# Patient Record
Sex: FEMALE | Race: WHITE | NOT HISPANIC OR LATINO | Employment: OTHER | URBAN - METROPOLITAN AREA
[De-identification: names, ages, dates, MRNs, and addresses within clinical notes are randomized per-mention and may not be internally consistent; named-entity substitution may affect disease eponyms.]

---

## 2019-04-18 ENCOUNTER — TRANSFERRED RECORDS (OUTPATIENT)
Dept: HEALTH INFORMATION MANAGEMENT | Facility: CLINIC | Age: 64
End: 2019-04-18

## 2019-04-29 ENCOUNTER — TELEPHONE (OUTPATIENT)
Dept: AUDIOLOGY | Facility: CLINIC | Age: 64
End: 2019-04-29

## 2019-05-01 ENCOUNTER — PRE VISIT (OUTPATIENT)
Dept: AUDIOLOGY | Facility: CLINIC | Age: 64
End: 2019-05-01

## 2019-05-01 NOTE — TELEPHONE ENCOUNTER
FUTURE VISIT INFORMATION      FUTURE VISIT INFORMATION:    Date: 5/17/19    Time: 12:30pm    Location: [facility] [place]  REFERRAL INFORMATION:    Referring provider:  N/a    Referring providers clinic:  [facility]    Reason for visit/diagnosis  CIE    RECORDS REQUESTED FROM:       Clinic name Comments Records Status Imaging Status   [facility] Request for records faxed 5/1- Audio from 4/18/19 received and sent to scanning EPIC

## 2019-05-07 ENCOUNTER — TELEPHONE (OUTPATIENT)
Dept: AUDIOLOGY | Facility: CLINIC | Age: 64
End: 2019-05-07

## 2019-05-07 NOTE — TELEPHONE ENCOUNTER
Returned patient's call and let her know that we will bill Medicare for all of the testing completed on 5/17 during her CIE appointment and typically the only part that we know is not covered in part by Medicare is the hearing aid check portion. She reported that her hearing aids are brand new so won't need to be checked. I explained that this is a mandatory part of the appointment so they it can be documented that all aided testing is completed with appropriately fit hearing aids.    Patient also reported she has a list of questions which I encouraged her to bring with to the appointment. She also has an appointment scheduled with [place] on the 15th of this month and asked why she should come to our clinic rather than [facility]. I assured her that the candidacy criteria is going to be the same wherever she decides to go for the CI and that she should choose a clinic that she is comfortable with and is convenient for her to get to as there are typically a lot of visits scheduled in the first few months.    Tato Damico, [facility]  Licensed Audiologist  [place] License #[id number]

## 2019-05-07 NOTE — TELEPHONE ENCOUNTER
[facility] Call Center    Phone Message    May a detailed message be left on voicemail: yes    Reason for Call: Other: pt wants to know if her CIE visit will be covered my Medicare. Please call to discuss.  pt says she was also at a coffee and chat and has a list of questions she would like answered.     Action Taken: Message routed to:  [facility] ([facility] aud

## 2019-05-16 ENCOUNTER — HEALTH MAINTENANCE LETTER (OUTPATIENT)
Age: 64
End: 2019-05-16

## 2019-05-17 ENCOUNTER — OFFICE VISIT (OUTPATIENT)
Dept: AUDIOLOGY | Facility: CLINIC | Age: 64
End: 2019-05-17
Payer: MEDICARE

## 2019-05-17 DIAGNOSIS — H90.3 SENSORINEURAL HEARING LOSS, BILATERAL: Primary | ICD-10-CM

## 2019-05-18 NOTE — PROGRESS NOTES
AUDIOLOGY REPORT    PURPOSE: To evaluate candidacy for cochlear implant (CI). Candidacy requires at least a moderate to profound sensorineural hearing loss in both ears, good general health, lack of benefit from conventional amplification as determined from the tests below, psychological/emotional stability and motivationally suitable, with realistic expectations, and absence of medical conditions contraindicating surgical intervention.     BACKGROUND:  Justus Lozano was seen on 5/17/2019 was seen in Audiology at the [facility], for a cochlear implant evaluation, which was ordered by Dr. Alem Vizcaino. Justus Lozano has been diagnosed with left normal to moderately-severe sensorineural hearing loss and a right mild to profound sensorineural hearing loss.      Onset of hearing loss: Early grade school/young adulthood. Patient reported that she did not pass classroom screening  Identification of hearing loss: 36 years old  Onset of profound hearing loss: 28 years old in the right ear  Etiology of hearing loss: Unknown, patient reports possible speculation of frequent ear infections in the right ear in childhood  Sudden or Progressive: Progressive  Age Hearing Aid fit: 38 years old  Duration of Hearing Aid use: Consistent since first fit  Current Hearing Aid Type: Oticon Opn 1 with custom EMANI mold  Age of current Hearing Aid: 2 months (recently was sent new ones due to need for repair)  Tinnitus: Present bilaterally  Balance: No issues reported  Other:  N/A  Does patient exhibit intelligible vocalizations?  Yes  Primary Mode of Communication: Oral/aural/lipreading   Previous Surgeries: Repaired tibia (1995), pelvis floor repair (2014), hernia repair (2016), bunion surgery (2017).  Previous Ear Surgeries: N/A      TEST RESULTS:    Audiometric Results (see audiogram): Normal sloping to moderately-severe sensorineural hearing loss in the left ear. Mild to profound  sensorineural hearing loss in the right ear  Tympanograms: normal eardrum mobility bilaterally  Acoustic Reflexes: Right ipsi reflex absent, left elevated.  Contralateral reflexes absent bilaterally.  Otoacoustic emissions: DPOAEs absent 2-5 kHz bilaterally.      Device(s) used for Aided Testing:   Right ear: Patient's personal Oticon Opn 1  Left ear: Patient's personal Oticon Opn 1    Electroacoustic Test Results: Electroacoustic analysis revealed patient's hearing aids were functioning appriopriately and up to 's specifications. Patient's personal left hearing aid was meeting NAL-NL1 prescriptive targets when assessed using simulated real-ear measurements, the right one was slightly below target, however patient reported that she had it decreased in the high frequencies slightly to reduce distortion. Patient's personal hearing aids were used for all testing.      35 minutes were spent assessing the patient s auditory rehabilitation status in order to determine whether conventional amplification is beneficial or whether the patient is an audiologic candidate for a cochlear implant.      Soundfield Thresholds (see audiogram): Aided thresholds in normal to moderate hearing loss range with left hearing aid and in the normal to profound hearing loss range with right hearing aid.    CNC Words Test: The patient repeats 25 single syllable words, auditory only. The words are presented at 60 dB SPL (conversational level) delivered from a CD player.     Left ear aided: words: 72%, phonemes: 88%  Right ear aided: words: 36%, phonemes: 59%    AzBio Sentences Test: The patient repeats 20 sentences, auditory only.  The sentences are presented at 60 dB SPL (conversational level) delivered from a CD player.       Left ear aided: 93%  Right ear aided: 60%  Bilaterally aided: 93%, +10 dB SNR: 73%    EAR RECOMMENDED FOR IMPLANTATION: Neither    REASON: Patient is not meeting FDA/Medicare candidacy criteria and is  receiving adequate speech understanding benefit from hearing aids at this time.    COMMENTS: We discussed FDA and Medicare candidacy guidelines and that Justus is not meeting criteria. Other items discussed included:    -Cochlear implant systems including the internal and external devices. We also discussed how they work and function differently than hearing aids. This included the differences between acoustic and electric hearing.    -We discussed that as though her single word scores are poorer than her sentence scores, that Medicare does not recognize CNC scores for the hybrid implant.     -We discussed the addition of wireless accessories, such as a remote microphone which could help in the areas that the hearing aids do not seem to do enough, such as in background noise or in the car.      SUMMARY AND RECOMMENDATIONS: Justus Lozano has been diagnosed with a bilateral sensorineural hearing loss and is not a candidate for cochlear implantation at this time. It is recommended that she return to their managing audiologist for hearing aid care. Please contact the clinic at [phone] with questions regarding these results or recommendations.    Tato Damico, [facility]  Licensed Audiologist  [place] License #[id number]

## 2019-05-20 DIAGNOSIS — H91.90 CHANGE IN HEARING: Primary | ICD-10-CM

## 2019-05-20 NOTE — PROGRESS NOTES
Rashel Blue Der, RN          Previous Messages      ----- Message -----   From: Sandy Johnson, Jaquan   Sent: 5/18/2019  10:11 AM   To: Rashel Kiser,     Can you put in an order for this CIE from Friday? I think she was referred to [facility].     THANK YOU!!!

## 2019-10-01 ENCOUNTER — HEALTH MAINTENANCE LETTER (OUTPATIENT)
Age: 64
End: 2019-10-01

## 2019-12-15 ENCOUNTER — HEALTH MAINTENANCE LETTER (OUTPATIENT)
Age: 64
End: 2019-12-15

## 2021-01-15 ENCOUNTER — HEALTH MAINTENANCE LETTER (OUTPATIENT)
Age: 66
End: 2021-01-15

## 2021-03-08 ENCOUNTER — TRANSFERRED RECORDS (OUTPATIENT)
Dept: HEALTH INFORMATION MANAGEMENT | Facility: CLINIC | Age: 66
End: 2021-03-08

## 2021-06-23 ENCOUNTER — TRANSFERRED RECORDS (OUTPATIENT)
Dept: HEALTH INFORMATION MANAGEMENT | Facility: CLINIC | Age: 66
End: 2021-06-23

## 2021-09-04 ENCOUNTER — HEALTH MAINTENANCE LETTER (OUTPATIENT)
Age: 66
End: 2021-09-04

## 2021-10-30 ENCOUNTER — HEALTH MAINTENANCE LETTER (OUTPATIENT)
Age: 66
End: 2021-10-30

## 2021-12-08 ENCOUNTER — TRANSFERRED RECORDS (OUTPATIENT)
Dept: HEALTH INFORMATION MANAGEMENT | Facility: CLINIC | Age: 66
End: 2021-12-08

## 2022-02-15 ENCOUNTER — APPOINTMENT (OUTPATIENT)
Dept: GENERAL RADIOLOGY | Facility: CLINIC | Age: 67
DRG: 501 | End: 2022-02-15
Attending: EMERGENCY MEDICINE
Payer: MEDICARE

## 2022-02-15 ENCOUNTER — HOSPITAL ENCOUNTER (INPATIENT)
Facility: CLINIC | Age: 67
LOS: 4 days | Discharge: HOME HEALTH | DRG: 501 | End: 2022-02-19
Attending: EMERGENCY MEDICINE | Admitting: INTERNAL MEDICINE
Payer: MEDICARE

## 2022-02-15 ENCOUNTER — APPOINTMENT (OUTPATIENT)
Dept: CT IMAGING | Facility: CLINIC | Age: 67
DRG: 501 | End: 2022-02-15
Attending: EMERGENCY MEDICINE
Payer: MEDICARE

## 2022-02-15 DIAGNOSIS — S22.080A COMPRESSION FRACTURE OF T11 VERTEBRA, INITIAL ENCOUNTER (H): ICD-10-CM

## 2022-02-15 DIAGNOSIS — K59.03 DRUG-INDUCED CONSTIPATION: ICD-10-CM

## 2022-02-15 DIAGNOSIS — S52.501A CLOSED FRACTURE OF DISTAL END OF RIGHT RADIUS, UNSPECIFIED FRACTURE MORPHOLOGY, INITIAL ENCOUNTER: ICD-10-CM

## 2022-02-15 DIAGNOSIS — S32.10XA CLOSED FRACTURE OF SACRUM, UNSPECIFIED PORTION OF SACRUM, INITIAL ENCOUNTER (H): ICD-10-CM

## 2022-02-15 DIAGNOSIS — S52.611A CLOSED DISPLACED FRACTURE OF STYLOID PROCESS OF RIGHT ULNA, INITIAL ENCOUNTER: ICD-10-CM

## 2022-02-15 DIAGNOSIS — N30.00 ACUTE CYSTITIS WITHOUT HEMATURIA: ICD-10-CM
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
## 2022-02-15 PROBLEM — U07.1 CLINICAL DIAGNOSIS OF COVID-19: Status: ACTIVE | Noted: 2022-02-15

## 2022-02-15 LAB
ABO/RH(D): NORMAL
ANION GAP SERPL CALCULATED.3IONS-SCNC: 7 MMOL/L (ref 3–14)
ANTIBODY SCREEN: NEGATIVE
BASOPHILS # BLD AUTO: 0 10E3/UL (ref 0–0.2)
BASOPHILS NFR BLD AUTO: 0 %
BUN SERPL-MCNC: 12 MG/DL (ref 7–30)
CALCIUM SERPL-MCNC: 8.6 MG/DL (ref 8.5–10.1)
CHLORIDE BLD-SCNC: 105 MMOL/L (ref 94–109)
CO2 SERPL-SCNC: 22 MMOL/L (ref 20–32)
CREAT SERPL-MCNC: 0.49 MG/DL (ref 0.52–1.25)
EOSINOPHIL # BLD AUTO: 0 10E3/UL (ref 0–0.7)
EOSINOPHIL NFR BLD AUTO: 0 %
ERYTHROCYTE [DISTWIDTH] IN BLOOD BY AUTOMATED COUNT: 13.2 % (ref 10–15)
GFR SERPL CREATININE-BSD FRML MDRD: >90 ML/MIN/1.73M2
GLUCOSE BLD-MCNC: 102 MG/DL (ref 70–99)
GLUCOSE BLDC GLUCOMTR-MCNC: 115 MG/DL (ref 70–99)
HCT VFR BLD AUTO: 41.6 % (ref 35–53)
HGB BLD-MCNC: 13.6 G/DL (ref 11.7–17.7)
IMM GRANULOCYTES # BLD: 0.1 10E3/UL
IMM GRANULOCYTES NFR BLD: 1 %
INR PPP: 1.08 (ref 0.85–1.15)
LYMPHOCYTES # BLD AUTO: 1 10E3/UL (ref 0.8–5.3)
LYMPHOCYTES NFR BLD AUTO: 9 %
MCH RBC QN AUTO: 30.5 PG (ref 26.5–33)
MCHC RBC AUTO-ENTMCNC: 32.7 G/DL (ref 31.5–36.5)
MCV RBC AUTO: 93 FL (ref 78–100)
MONOCYTES # BLD AUTO: 0.7 10E3/UL (ref 0–1.3)
MONOCYTES NFR BLD AUTO: 6 %
NEUTROPHILS # BLD AUTO: 9.9 10E3/UL (ref 1.6–8.3)
NEUTROPHILS NFR BLD AUTO: 84 %
NRBC # BLD AUTO: 0 10E3/UL
NRBC BLD AUTO-RTO: 0 /100
PLATELET # BLD AUTO: 235 10E3/UL (ref 150–450)
POTASSIUM BLD-SCNC: 4.2 MMOL/L (ref 3.4–5.3)
RBC # BLD AUTO: 4.46 10E6/UL (ref 3.8–5.9)
SARS-COV-2 RNA RESP QL NAA+PROBE: POSITIVE
SODIUM SERPL-SCNC: 134 MMOL/L (ref 133–144)
SPECIMEN EXPIRATION DATE: NORMAL
WBC # BLD AUTO: 11.7 10E3/UL (ref 4–11)

## 2022-02-15 PROCEDURE — 85004 AUTOMATED DIFF WBC COUNT: CPT | Performed by: EMERGENCY MEDICINE

## 2022-02-15 PROCEDURE — 72100 X-RAY EXAM L-S SPINE 2/3 VWS: CPT

## 2022-02-15 PROCEDURE — 73110 X-RAY EXAM OF WRIST: CPT | Mod: RT

## 2022-02-15 PROCEDURE — 36415 COLL VENOUS BLD VENIPUNCTURE: CPT | Performed by: EMERGENCY MEDICINE

## 2022-02-15 PROCEDURE — 96376 TX/PRO/DX INJ SAME DRUG ADON: CPT

## 2022-02-15 PROCEDURE — 250N000011 HC RX IP 250 OP 636: Performed by: INTERNAL MEDICINE

## 2022-02-15 PROCEDURE — 25605 CLTX DST RDL FX/EPHYS SEP W/: CPT | Mod: RT

## 2022-02-15 PROCEDURE — 96374 THER/PROPH/DIAG INJ IV PUSH: CPT

## 2022-02-15 PROCEDURE — 999N000065 XR WRIST PORTABLE RIGHT 2 VIEWS: Mod: RT

## 2022-02-15 PROCEDURE — 99285 EMERGENCY DEPT VISIT HI MDM: CPT | Mod: 25

## 2022-02-15 PROCEDURE — 80048 BASIC METABOLIC PNL TOTAL CA: CPT | Performed by: EMERGENCY MEDICINE

## 2022-02-15 PROCEDURE — 258N000003 HC RX IP 258 OP 636: Performed by: INTERNAL MEDICINE

## 2022-02-15 PROCEDURE — 250N000013 HC RX MED GY IP 250 OP 250 PS 637: Performed by: INTERNAL MEDICINE

## 2022-02-15 PROCEDURE — 250N000013 HC RX MED GY IP 250 OP 250 PS 637: Performed by: HOSPITALIST

## 2022-02-15 PROCEDURE — 250N000013 HC RX MED GY IP 250 OP 250 PS 637: Performed by: EMERGENCY MEDICINE

## 2022-02-15 PROCEDURE — C9803 HOPD COVID-19 SPEC COLLECT: HCPCS

## 2022-02-15 PROCEDURE — 72131 CT LUMBAR SPINE W/O DYE: CPT

## 2022-02-15 PROCEDURE — 99223 1ST HOSP IP/OBS HIGH 75: CPT | Mod: AI | Performed by: INTERNAL MEDICINE

## 2022-02-15 PROCEDURE — 0PSHXZZ REPOSITION RIGHT RADIUS, EXTERNAL APPROACH: ICD-10-PCS | Performed by: EMERGENCY MEDICINE

## 2022-02-15 PROCEDURE — 120N000001 HC R&B MED SURG/OB

## 2022-02-15 PROCEDURE — 250N000011 HC RX IP 250 OP 636: Performed by: EMERGENCY MEDICINE

## 2022-02-15 PROCEDURE — 250N000011 HC RX IP 250 OP 636

## 2022-02-15 PROCEDURE — 85610 PROTHROMBIN TIME: CPT | Performed by: EMERGENCY MEDICINE

## 2022-02-15 PROCEDURE — 86901 BLOOD TYPING SEROLOGIC RH(D): CPT | Performed by: EMERGENCY MEDICINE

## 2022-02-15 PROCEDURE — 87635 SARS-COV-2 COVID-19 AMP PRB: CPT | Performed by: EMERGENCY MEDICINE

## 2022-02-15 RX ORDER — CEPHALEXIN 500 MG/1
500 CAPSULE ORAL 2 TIMES DAILY
Status: COMPLETED | OUTPATIENT
Start: 2022-02-15 | End: 2022-02-18

## 2022-02-15 RX ORDER — IBUPROFEN 200 MG
1 CAPSULE ORAL DAILY
COMMUNITY

## 2022-02-15 RX ORDER — VIT C/E/ZN/COPPR/LUTEIN/ZEAXAN 60 MG-6 MG
1 CAPSULE ORAL DAILY
Status: DISCONTINUED | OUTPATIENT
Start: 2022-02-16 | End: 2022-02-19 | Stop reason: HOSPADM

## 2022-02-15 RX ORDER — ZOLPIDEM TARTRATE 5 MG/1
5 TABLET ORAL
Status: DISCONTINUED | OUTPATIENT
Start: 2022-02-15 | End: 2022-02-19 | Stop reason: HOSPADM

## 2022-02-15 RX ORDER — OXYCODONE HYDROCHLORIDE 5 MG/1
10 TABLET ORAL ONCE
Status: COMPLETED | OUTPATIENT
Start: 2022-02-15 | End: 2022-02-15

## 2022-02-15 RX ORDER — ACETAMINOPHEN 325 MG/1
650 TABLET ORAL EVERY 6 HOURS PRN
Status: DISCONTINUED | OUTPATIENT
Start: 2022-02-15 | End: 2022-02-16 | Stop reason: ALTCHOICE

## 2022-02-15 RX ORDER — NALOXONE HYDROCHLORIDE 0.4 MG/ML
0.4 INJECTION, SOLUTION INTRAMUSCULAR; INTRAVENOUS; SUBCUTANEOUS
Status: DISCONTINUED | OUTPATIENT
Start: 2022-02-15 | End: 2022-02-19 | Stop reason: HOSPADM

## 2022-02-15 RX ORDER — AMOXICILLIN 250 MG
2 CAPSULE ORAL 2 TIMES DAILY
Status: DISCONTINUED | OUTPATIENT
Start: 2022-02-15 | End: 2022-02-19 | Stop reason: HOSPADM

## 2022-02-15 RX ORDER — AMOXICILLIN 250 MG
1 CAPSULE ORAL 2 TIMES DAILY
Status: DISCONTINUED | OUTPATIENT
Start: 2022-02-15 | End: 2022-02-19 | Stop reason: HOSPADM

## 2022-02-15 RX ORDER — CHOLECALCIFEROL (VITAMIN D3) 50 MCG
50 TABLET ORAL DAILY
Status: DISCONTINUED | OUTPATIENT
Start: 2022-02-16 | End: 2022-02-19 | Stop reason: HOSPADM

## 2022-02-15 RX ORDER — OXYCODONE HYDROCHLORIDE 5 MG/1
5 TABLET ORAL EVERY 4 HOURS PRN
Status: DISCONTINUED | OUTPATIENT
Start: 2022-02-15 | End: 2022-02-15

## 2022-02-15 RX ORDER — PROPOFOL 10 MG/ML
INJECTION, EMULSION INTRAVENOUS
Status: COMPLETED
Start: 2022-02-15 | End: 2022-02-15

## 2022-02-15 RX ORDER — HYDROMORPHONE HYDROCHLORIDE 1 MG/ML
.5-1 INJECTION, SOLUTION INTRAMUSCULAR; INTRAVENOUS; SUBCUTANEOUS
Status: DISCONTINUED | OUTPATIENT
Start: 2022-02-15 | End: 2022-02-15

## 2022-02-15 RX ORDER — ONDANSETRON 4 MG/1
4 TABLET, ORALLY DISINTEGRATING ORAL EVERY 6 HOURS PRN
Status: DISCONTINUED | OUTPATIENT
Start: 2022-02-15 | End: 2022-02-19 | Stop reason: HOSPADM

## 2022-02-15 RX ORDER — ROSUVASTATIN CALCIUM 5 MG/1
5 TABLET, COATED ORAL EVERY EVENING
Status: DISCONTINUED | OUTPATIENT
Start: 2022-02-15 | End: 2022-02-19 | Stop reason: HOSPADM

## 2022-02-15 RX ORDER — CHOLECALCIFEROL (VITAMIN D3) 50 MCG
1 TABLET ORAL DAILY
COMMUNITY

## 2022-02-15 RX ORDER — SODIUM CHLORIDE 9 MG/ML
INJECTION, SOLUTION INTRAVENOUS CONTINUOUS
Status: DISCONTINUED | OUTPATIENT
Start: 2022-02-15 | End: 2022-02-16

## 2022-02-15 RX ORDER — ONDANSETRON 2 MG/ML
INJECTION INTRAMUSCULAR; INTRAVENOUS
Status: COMPLETED
Start: 2022-02-15 | End: 2022-02-15

## 2022-02-15 RX ORDER — NALOXONE HYDROCHLORIDE 0.4 MG/ML
0.2 INJECTION, SOLUTION INTRAMUSCULAR; INTRAVENOUS; SUBCUTANEOUS
Status: DISCONTINUED | OUTPATIENT
Start: 2022-02-15 | End: 2022-02-19 | Stop reason: HOSPADM

## 2022-02-15 RX ORDER — LIDOCAINE 40 MG/G
CREAM TOPICAL
Status: DISCONTINUED | OUTPATIENT
Start: 2022-02-15 | End: 2022-02-19 | Stop reason: HOSPADM

## 2022-02-15 RX ORDER — ONDANSETRON 2 MG/ML
4 INJECTION INTRAMUSCULAR; INTRAVENOUS EVERY 6 HOURS PRN
Status: DISCONTINUED | OUTPATIENT
Start: 2022-02-15 | End: 2022-02-19 | Stop reason: HOSPADM

## 2022-02-15 RX ORDER — ACETAMINOPHEN 650 MG/1
650 SUPPOSITORY RECTAL EVERY 6 HOURS PRN
Status: DISCONTINUED | OUTPATIENT
Start: 2022-02-15 | End: 2022-02-19 | Stop reason: HOSPADM

## 2022-02-15 RX ORDER — ALENDRONATE SODIUM 70 MG/1
70 TABLET ORAL
COMMUNITY
End: 2022-06-06

## 2022-02-15 RX ORDER — SUMATRIPTAN 50 MG/1
50 TABLET, FILM COATED ORAL
Status: COMPLETED | OUTPATIENT
Start: 2022-02-15 | End: 2022-02-15

## 2022-02-15 RX ORDER — HYDROMORPHONE HCL IN WATER/PF 6 MG/30 ML
0.2 PATIENT CONTROLLED ANALGESIA SYRINGE INTRAVENOUS
Status: DISCONTINUED | OUTPATIENT
Start: 2022-02-15 | End: 2022-02-19 | Stop reason: HOSPADM

## 2022-02-15 RX ORDER — OXYCODONE HYDROCHLORIDE 5 MG/1
5-10 TABLET ORAL EVERY 4 HOURS PRN
Status: DISCONTINUED | OUTPATIENT
Start: 2022-02-15 | End: 2022-02-16

## 2022-02-15 RX ORDER — ZOLPIDEM TARTRATE 10 MG/1
5 TABLET ORAL
COMMUNITY

## 2022-02-15 RX ORDER — ROSUVASTATIN CALCIUM 5 MG/1
5 TABLET, COATED ORAL EVERY EVENING
COMMUNITY

## 2022-02-15 RX ADMIN — SUMATRIPTAN SUCCINATE 50 MG: 50 TABLET ORAL at 22:57

## 2022-02-15 RX ADMIN — HYDROMORPHONE HYDROCHLORIDE 0.5 MG: 1 INJECTION, SOLUTION INTRAMUSCULAR; INTRAVENOUS; SUBCUTANEOUS at 10:40

## 2022-02-15 RX ADMIN — OXYCODONE HYDROCHLORIDE 10 MG: 5 TABLET ORAL at 20:58

## 2022-02-15 RX ADMIN — HYDROMORPHONE HYDROCHLORIDE 0.5 MG: 1 INJECTION, SOLUTION INTRAMUSCULAR; INTRAVENOUS; SUBCUTANEOUS at 15:59

## 2022-02-15 RX ADMIN — SENNOSIDES AND DOCUSATE SODIUM 1 TABLET: 50; 8.6 TABLET ORAL at 20:23

## 2022-02-15 RX ADMIN — CEPHALEXIN 500 MG: 500 CAPSULE ORAL at 20:23

## 2022-02-15 RX ADMIN — HYDROMORPHONE HYDROCHLORIDE 0.2 MG: 0.2 INJECTION, SOLUTION INTRAMUSCULAR; INTRAVENOUS; SUBCUTANEOUS at 17:47

## 2022-02-15 RX ADMIN — ONDANSETRON 4 MG: 2 INJECTION INTRAMUSCULAR; INTRAVENOUS at 14:14

## 2022-02-15 RX ADMIN — SODIUM CHLORIDE: 9 INJECTION, SOLUTION INTRAVENOUS at 17:57

## 2022-02-15 RX ADMIN — HYDROMORPHONE HYDROCHLORIDE 0.5 MG: 1 INJECTION, SOLUTION INTRAMUSCULAR; INTRAVENOUS; SUBCUTANEOUS at 12:18

## 2022-02-15 RX ADMIN — PROPOFOL 120 MCG/KG/MIN: 10 INJECTION, EMULSION INTRAVENOUS at 14:28

## 2022-02-15 RX ADMIN — OXYCODONE HYDROCHLORIDE 10 MG: 5 TABLET ORAL at 16:03

## 2022-02-15 RX ADMIN — ROSUVASTATIN CALCIUM 5 MG: 5 TABLET, FILM COATED ORAL at 21:21

## 2022-02-15 ASSESSMENT — ACTIVITIES OF DAILY LIVING (ADL)
ADLS_ACUITY_SCORE: 8
ADLS_ACUITY_SCORE: 14
ADLS_ACUITY_SCORE: 8
ADLS_ACUITY_SCORE: 14
ADLS_ACUITY_SCORE: 8
ADLS_ACUITY_SCORE: 14
ADLS_ACUITY_SCORE: 8
ADLS_ACUITY_SCORE: 14
ADLS_ACUITY_SCORE: 8

## 2022-02-15 ASSESSMENT — ENCOUNTER SYMPTOMS
NECK PAIN: 0
BACK PAIN: 1

## 2022-02-15 ASSESSMENT — MIFFLIN-ST. JEOR: SCORE: 1222.61

## 2022-02-15 NOTE — ED NOTES
[facility]  ED Nurse Handoff Report    ED Chief complaint: Fall      ED Diagnosis:   Final diagnoses:   Closed fracture of distal end of right radius, unspecified fracture morphology, initial encounter   Compression fracture of T11 vertebra, initial encounter (H)   Closed displaced fracture of styloid process of right ulna, initial encounter       Code Status: Full Code    Allergies:   Allergies   Allergen Reactions     Celebrex [Celecoxib] Hives     Sulfamethoxazole Hives       Patient Story: Pt brought in by ambulance after having a fall in a parking lot. Pt reports slipping on ice and falling on back. Denies LOC, denies hitting head. Pt reports lower back pain and R wrist pain. Visible deformity to R wrist. Pt A&O x4.   Focused Assessment:  Deformity to R wrist, skin warm and pink    Treatments and/or interventions provided: Dilaudid 1 mg, reduction of R wrist (splinted)  Patient's response to treatments and/or interventions: Decrease in pain, resting comfortably on cot    To be done/followed up on inpatient unit:  per admitting    Does this patient have any cognitive concerns?: Alert and oriented    Activity level - Baseline/Home:  Independent  Activity Level - Current:   Stand with assist    Patient's Preferred language: English   Needed?: No    Isolation: None and COVID r/o and special precautions  Infection: Not Applicable  COVID r/o and special precautions  Patient tested for COVID 19 prior to admission: YES  Bariatric?: No    Vital Signs:   Vitals:    02/15/22 1420 02/15/22 1425 02/15/22 1430 02/15/22 1435   BP: (!) 158/69 (!) 150/80 (!) 151/76 (!) 149/71   Pulse: 91 88 83 80   Resp: (!) 40 15 (!) 0 15   Temp:       TempSrc:       SpO2: 99% 100% 100% 100%   Weight:       Height:           Cardiac Rhythm:     Was the PSS-3 completed:   Yes  What interventions are required if any?               Family Comments:   OBS brochure/video discussed/provided to patient/family: N/A           Problem: Alcohol Withdrawal Management  Goal: # No alcohol-related delirium or seizures  Outcome: Outcome Met, Continue evaluating goal progress toward completion  CIWA's continued. Pt given valium one time.     Problem: Nausea/Vomiting  Goal: Maintains oral intake with decreased/no reports of nausea/vomiting  Outcome: Outcome Met, Continue evaluating goal progress toward completion  Pt reports no nausea only heartburn.     Problem: Pressure Injury, Risk for  Goal: No new pressure injury (PI) development  Outcome: Outcome Met, Continue evaluating goal progress toward completion  Pt walks to bathroom and turns herself in bed.                For the majority of the shift this patient's behavior was Green.   Behavioral interventions performed were Calm and cooperative    ED NURSE PHONE NUMBER: *[phone]

## 2022-02-15 NOTE — H&P
[facility]    History and Physical  Hospitalist       Date of Admission:  2/15/2022    Assessment & Plan   66 year old adult with past medical hx of Covid 1 month ago an is recovered, who presents with fall and right wrist and back pain:    Summary:    Principal Problem:    Compression fracture of T11 vertebra, initial encounter   -- pain meds, PT eval   -- she is asking about Vertebroplasty, will see if Ok with conservative treatment   (Wants Dr. Olguin if needed)      Right wrist Fx    -- reduced, splinted (Wants Dr. Lucita Rojas with [facility])      Probable Sacral Fracture      Fall, slipped on ice today   -- PT and OT eval      Leukocytosis -- probably stress related   Active Problems:    COVID-19 Jan 2022, Now Recovered (still positive 2/15/22)   -- no treatment       UTI -- on Keflex      Osteoporosis   -- continue home meds    Plan: Admit to med floor, and get ortho consult (?need for wrist surgery, and PT eval if able to ambulate given T11 and possible sacral fracture)    DVT Prophylaxis: Pneumatic Compression Devices  Code Status: Full Code    Disposition: Expected discharge ?home in 1-2 days, or may need TCU if unable to ambulate.     Gil Thomas  Pager: [phone]  Cell Phone:  [phone]     Primary Care Physician   Abisai Ray    Chief Complaint   Fall, wrist and back pain    History is obtained from Patient    History of Present Illness   66 year old right-handed adult with history of osteopenia who presents via EMS with a right wrist injury and lower back pain after a fall. The patient states she was in the parking lot today, walking around the car, when she slipped on the ice and fell onto her bottom. She states she tried to catch her fall with her right hand. She denies having hit her head but states she couldn't get up on her own afterwards due to pain in her lower back. She denies any pain in her legs or neck. She is not on any blood thinners.  She  denies any numbness, tingling, or weakness to her lower extremities or to the fingers of her right hand.  She is currently on antibiotics for UTI.     In ER, AVSS, WBC 11.7, right wrist fract of distal radius and ulna, and Lumbar Xray with T11 compression fracture, Lumbar DJD, and possible non-displaced sacral fracture.      PAST MEDICAL HISTORY    Past Medical History:   Diagnosis Date     Arthritis     bilateral thumbs     Disorder of bone and cartilage, unspecified      Hearing loss     BILAT HEARING AIDS     Hernia, inguinal      Insomnia      Metrorrhagia      Migraine, unspecified, without mention of intractable migraine without mention of status migrainosus      Thoracic or lumbosacral neuritis or radiculitis, unspecified      Urticaria, unspecified      Viral warts, unspecified         PAST SURGICAL HISTORY    Past Surgical History:   Procedure Laterality Date     ABDOMEN SURGERY  3-    Robotic hysterectomy     ABDOMINAL SACROCOLPOPEXY       HC REMOVE TONSILS/ADENOIDS,12+ Y/O  10 TH GRADE     LAPAROSCOPIC HERNIORRHAPHY FEMORAL Bilateral 12/7/2015    Procedure: LAPAROSCOPIC HERNIORRHAPHY FEMORAL;  Surgeon: Tian Mata MD;  Location: [facility]     PERINEOPLASTY       RECTOCELE REPAIR       ELISA BSO          Prior to Admission Medications   Prior to Admission Medications   Prescriptions Last Dose Informant Patient Reported? Taking?   Multiple Vitamins-Minerals (PRESERVISION AREDS 2 PO) 2/14/2022 at am Self Yes Yes   Sig: Take 1 tablet by mouth daily   SUMAtriptan Succinate (IMITREX PO) 2/13/2022 Self Yes Yes   Sig: Take 50 mg by mouth once As needed for migraine   alendronate (FOSAMAX) 70 MG tablet 2/12/2022 at AM Self Yes Yes   Sig: Take 70 mg by mouth every 7 days   calcium carbonate 500 mg, elemental, (OSCAL 500) 1250 (500 Ca) MG TABS tablet 2/14/2022 at Unknown time Self Yes Yes   Sig: Take 1 tablet by mouth daily   cephALEXin (KEFLEX) 250 MG capsule 2/15/2022 at x1 AM Self Yes Yes   Sig: Take  250 mg by mouth 4 times daily Filled on 2/14/2022 for 7 days supply.    estradiol (ESTRACE) 0.1 MG/GM vaginal cream 2/14/2022 at hs Self Yes Yes   Sig: Place 1 g vaginally twice a week    rosuvastatin (CRESTOR) 5 MG tablet 2/14/2022 at pm Self Yes Yes   Sig: Take 5 mg by mouth every evening   vitamin D3 (CHOLECALCIFEROL) 50 mcg (2000 units) tablet 2/14/2022 at am Self Yes Yes   Sig: Take 1 tablet by mouth daily   zolpidem (AMBIEN) 10 MG tablet 2/10/2022 at hs Self Yes Yes   Sig: Take 5 mg by mouth nightly as needed for sleep       Facility-Administered Medications: None     Allergies   Allergies   Allergen Reactions     Celebrex [Celecoxib] Hives     Sulfamethoxazole Hives       SOCIAL HISTORY    Social History     Social History Narrative    , 3 children, 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  (last updated 2/15/2022)       Social History     Tobacco Use     Smoking status: Never Smoker     Smokeless tobacco: Never Used   Substance Use Topics     Alcohol use: No     Drug use: No        FAMILY HISTORY    Family History   Problem Relation Age of Onset     Hypertension Mother      Osteoporosis Mother      Heart Disease Father      Cerebrovascular Disease Father         Review of Systems   The 10 point Review of Systems is negative other than noted in the HPI or here.       PHYSICAL EXAM     Temp: 98.2  F (36.8  C) Temp src: Oral BP: (!) 149/71 Pulse: 80   Resp: 15 SpO2: 100 % O2 Device: None (Room air)    Vital Signs with Ranges  Temp:  [98.2  F (36.8  C)] 98.2  F (36.8  C)  Pulse:  [73-91] 80  Resp:  [0-40] 15  BP: (127-168)/() 149/71  SpO2:  [96 %-100 %] 100 %  135 lbs 0 oz    Constitutional: Awake, alert, cooperative, no apparent distress.  Eyes: Conjunctiva and pupils examined and normal.  HEENT: Moist mucous membranes, normal dentition.  Respiratory: Clear to auscultation bilaterally, no crackles or wheezing.  Cardiovascular: Regular rate and rhythm, normal S1 and S2, and no murmur noted, no  carotid bruits.  No ankle edema.   GI: Soft, non-distended, non-tender, normal bowel sounds.  Lymph/Hematologic: No anterior cervical, supraclavicular or axillary adenopathy.  Skin: No rashes, no cyanosis.   Musculoskeletal:  Right wrist in splint, tender over lumbar region  Neurologic: Alert, Ox3, Cranial nerves 2-12 intact, no focal weakness or numbness  Psychiatric:  No obvious anxiety or depression.    Data   Data reviewed today:  I personally reviewed no EKG's today.  Recent Labs   Lab 02/15/22  1249   WBC 11.7*   HGB 13.6   MCV 93      INR 1.08      POTASSIUM 4.2   CHLORIDE 105   CO2 22   BUN 12   CR 0.49*   ANIONGAP 7   HANDY 8.6   *       Imaging:  Recent Results (from the past 24 hour(s))   XR Wrist Right 3 Views    Narrative    WRIST RIGHT THREE OR MORE VIEWS  DATE/TIME: 2/15/2022 12:14 PM     INDICATION: Right-sided wrist pain after a fall.   COMPARISON: None.      Impression    IMPRESSION:  1.  Transverse fracture of the right radius distal metaphysis.  Moderate dorsal displacement, mild impaction, and mild volar apex  angulation. The distal articular surface remains congruent.  2.  Oblique mildly displaced fracture of the ulnar styloid base.  3.  Mild ulna positive variance.  4.  Trapeziectomy.  5.  Instrumented arthrodesis of the thumb MCP joint.   XR Lumbar Spine 2/3 Views    Narrative    LUMBAR SPINE TWO - THREE VIEWS 2/15/2022 12:14 PM     COMPARISON: None.    HISTORY: Fall, pain.      Impression    IMPRESSION: Five lumbar type vertebrae. Grade 1 degenerative  anterolisthesis of L4 upon L5. Alignment otherwise normal. Vertebral  body heights of the lumbar spine are normal. There is age  indeterminate mild anterior wedge compression fracture deformity of  the T11 vertebral body. No definite recent fractures. Facet  arthropathy at L3-L4, L4-L5 and L5-S1. Loss of disc space height and  degenerative endplate spurring at L2-L3, L4-L5 and L5-S1. Questionable  lucency at the S3-S4 level  of the sacrum. Dedicated sacral imaging  recommended to exclude fracture.    ROS DOMINGUEZ MD         SYSTEM ID:  [id number]   CT Lumbar Spine w/o Contrast    Narrative    CT OF THE LUMBAR SPINE WITHOUT CONTRAST  2/15/2022 1:24 PM     COMPARISON: Lumbar spine x-ray series same day.    HISTORY: Fall, low back pain.     TECHNIQUE: Axial images of the lumbar spine were acquired without  intravenous contrast. Multiplanar reformations were created from the  axial source images.        Impression    IMPRESSION: Moderate anterior wedge compression fracture deformity of  the T11 vertebral body again noted and appears recent  (acute-subacute). Linear lucency through the sacrum at the S3-S4  junction is again noted and also likely represents an acute or  subacute nondisplaced fracture. Degenerative grade 1 anterolisthesis  of L4 upon L5 again noted. Alignment otherwise normal. No other  fractures. Mild degenerative spinal canal narrowing at L4-L5. No other  spinal canal narrowing of the lumbar spine. No significant neural  foraminal stenosis at any level of the lumbar spine.      Radiation dose for this scan was reduced using automated exposure  control, adjustment of the mA and/or kV according to patient size, or  iterative reconstruction technique    ROS DOMINGUEZ MD         SYSTEM ID:  [id number]

## 2022-02-15 NOTE — ED TRIAGE NOTES
Pt BIBA from [facility] [employer] parking lot where Pt slipped on ice. EMS report; Pt slipped on ice and fell onto back, Pt denies LOC and denies hitting head. Pt has 9/10 pain to lower back and 9/10 pain to R wrist. Visible deformity to R wrist. 100 mcg Fentanyl given by medics, Pt on Keflex for UTI and has taken 2 doses.

## 2022-02-15 NOTE — ED NOTES
Bed: ED21  Expected date:   Expected time:   Means of arrival:   Comments:  730 66F fall/back pain

## 2022-02-15 NOTE — ED PROVIDER NOTES
"  History   Chief Complaint:  Fall       HPI   Justus Lozano is a 66 year old right-handed female with a history of osteopenia who presents via EMS with a right wrist injury and lower back pain after a fall. The patient states she was in the parking lot today, walking around the car, when she slipped on the ice and fell onto her bottom. She states she tried to catch her fall with her right hand. She denies having hit her head but states she couldn't get up on her own afterwards due to pain in her lower back. She denies any pain in her legs or neck. She is not on any blood thinners.  She denies any numbness, tingling, or weakness to her lower extremities or to the fingers of her right hand.  She is currently on antibiotics for UTI. Last food intake was at 0900 this morning.    Review of Systems   Musculoskeletal: Positive for back pain (lower). Negative for neck pain.   Neurological: Negative for syncope.   All other systems reviewed and are negative.    Allergies:  Celebrex  Sulfamethoxazole    Medications:  Fosamax  Keflex  Cresor  Imitrex  Ambien    Past Medical History:     Arthritis  Inguinal hernia  Insomnia  Mertorrhagia  Migraine  Thoracic or lumbosacral neuritis or radiculitis    Hyperlipidemia  Osteopenia    Past Surgical History:    Robotic hysterectomy  Abdominal sacrocolpopexy  Tonsillectomy and adenoidectomy  Perineoplasty  Rectocele repair  Laparoscopic femoral herniorrhaphy     Family History:    Mother: Hypertension, Heart disease   Father: Stroke  Sister 2: Kidney failure    Social History:  The patient presents with her son. Never smoker.    Physical Exam     Patient Vitals for the past 24 hrs:   BP Temp Temp src Pulse Resp SpO2 Height Weight   02/15/22 1215 (!) 151/75 -- -- 73 16 98 % -- --   02/15/22 1030 (!) 168/110 98.2  F (36.8  C) Oral 87 18 100 % 1.778 m (5' 10\") 61.2 kg (135 lb)       Physical Exam  Nursing note and vitals reviewed.  Constitutional:  Oriented to person, place, and time. " Cooperative.  Appears uncomfortable.  HENT:   Nose:    Nose normal.   Mouth/Throat:   Mucous membranes are normal.   Eyes:    Conjunctivae normal and EOM are normal.      Pupils are equal, round, and reactive to light.   Neck:    Trachea normal.   Cardiovascular:  Normal rate, regular rhythm, normal heart sounds and normal pulses. No murmur heard.  Pulmonary/Chest:  Effort normal and breath sounds normal.   Abdominal:   Soft. Normal appearance and bowel sounds are normal.      There is no tenderness.      There is no rebound and no CVA tenderness.   Musculoskeletal:  Obvious deformity to the right wrist which is tender to palpation.  Extremities otherwise atraumatic x 4.   Lymphadenopathy:  No cervical adenopathy.   Neurological:   Alert and oriented to person, place, and time. Normal strength.      No cranial nerve deficit or sensory deficit. GCS eye subscore is 4. GCS verbal subscore is 5. GCS motor subscore is 6.  Distal CMS intact in the fingers of the right hand and to both lower extremities.  Skin:    Skin is intact. No rash noted.   Psychiatric:   Normal mood and affect.      Emergency Department Course     Imaging:  XR Wrist Port Right 2 Views   Final Result   Impression:      1.  Interval closed reduction and splinting of the distal radius and   ulnar fractures. Fractures are in near anatomic position and   alignment. No new bone or joint abnormality identified.      ALLAN MENDEZ MD            SYSTEM ID:  [id number]      CT Lumbar Spine w/o Contrast   Final Result   IMPRESSION: Moderate anterior wedge compression fracture deformity of   the T11 vertebral body again noted and appears recent   (acute-subacute). Linear lucency through the sacrum at the S3-S4   junction is again noted and also likely represents an acute or   subacute nondisplaced fracture. Degenerative grade 1 anterolisthesis   of L4 upon L5 again noted. Alignment otherwise normal. No other   fractures. Mild degenerative spinal canal  narrowing at L4-L5. No other   spinal canal narrowing of the lumbar spine. No significant neural   foraminal stenosis at any level of the lumbar spine.         Radiation dose for this scan was reduced using automated exposure   control, adjustment of the mA and/or kV according to patient size, or   iterative reconstruction technique      ROS DOMINGUEZ MD            SYSTEM ID:  [id number]      XR Lumbar Spine 2/3 Views   Final Result   IMPRESSION: Five lumbar type vertebrae. Grade 1 degenerative   anterolisthesis of L4 upon L5. Alignment otherwise normal. Vertebral   body heights of the lumbar spine are normal. There is age   indeterminate mild anterior wedge compression fracture deformity of   the T11 vertebral body. No definite recent fractures. Facet   arthropathy at L3-L4, L4-L5 and L5-S1. Loss of disc space height and   degenerative endplate spurring at L2-L3, L4-L5 and L5-S1. Questionable   lucency at the S3-S4 level of the sacrum. Dedicated sacral imaging   recommended to exclude fracture.      ROS DOMINGUEZ MD            SYSTEM ID:  [id number]      XR Wrist Right 3 Views   Preliminary Result   IMPRESSION:   1.  Transverse fracture of the right radius distal metaphysis.   Moderate dorsal displacement, mild impaction, and mild volar apex   angulation. The distal articular surface remains congruent.   2.  Oblique mildly displaced fracture of the ulnar styloid base.   3.  Mild ulna positive variance.   4.  Trapeziectomy.   5.  Instrumented arthrodesis of the thumb MCP joint.        Report per radiology    Laboratory:  Labs Ordered and Resulted from Time of ED Arrival to Time of ED Departure   COVID-19 VIRUS (CORONAVIRUS) BY PCR - Abnormal       Result Value    SARS CoV2 PCR Positive (*)    CBC WITH PLATELETS AND DIFFERENTIAL - Abnormal    WBC Count 11.7 (*)     RBC Count 4.46      Hemoglobin 13.6      Hematocrit 41.6      MCV 93      MCH 30.5      MCHC 32.7      RDW 13.2      Platelet Count       INR   BASIC  METABOLIC PANEL   TYPE AND SCREEN, ADULT   ABO/RH TYPE AND SCREEN        [facility]    -Fracture    Date/Time: 2/15/2022 2:21 PM  Performed by: Medardo Youngblood MD  Authorized by: Medardo Youngblood MD     Risks, benefits and alternatives discussed.    ED EVALUATION:      I have performed an Emergency Department Evaluation including taking a history and physical examination, this evaluation will be documented in the electronic medical record for this ED encounter.      ASA Class: Class 2- mild systemic disease, no acute problems, no functional limitations    Mallampati: Grade 1- soft palate, uvula, tonsillar pillars, and posterior pharyngeal wall visible    UNIVERSAL PROTOCOL   Site Marked: Yes  Prior Images Obtained and Reviewed:  Yes  Required items: Required blood products, implants, devices and special equipment available    Patient identity confirmed:  Verbally with patient  Patient was reevaluated immediately before administering moderate or deep sedation or anesthesia  Confirmation Checklist:  Patient's identity using two indicators  Time out: Immediately prior to the procedure a time out was called    Universal Protocol: the Joint Commission Universal Protocol was followed    Preparation: Patient was prepped and draped in usual sterile fashion      INJURY      Injury location:  Forearm    Forearm fracture type: distal radius and ulnar styloid      PRE PROCEDURE ASSESSMENT      Neurological function: normal      Distal perfusion: normal      Range of motion: reduced      SEDATION  Patient Sedated: Yes    Sedation Type:  Deep  Sedation:  Propofol  Vital signs: Vital signs monitored during sedation      ANESTHESIA (see MAR for exact dosages)      Anesthesia method:  None    PROCEDURE DETAILS:     Manipulation performed: yes      Skeletal traction used: yes      Reduction successful: yes      X-ray confirmed reduction: yes      Immobilization:  Splint    Splint type:  Sugar tong     Supplies used:  Plaster    POST PROCEDURE ASSESSMENT      Neurological function: normal      Distal perfusion: normal      Range of motion: unchanged        PROCEDURE    Patient Tolerance:  Patient tolerated the procedure well with no immediate complications  Length of time physician/provider present for 1:1 monitoring during sedation: 10    Emergency Department Course:  Reviewed:  I reviewed nursing notes, vitals, past medical history and Care Everywhere    Assessments:  1030 I obtained history and examined the patient as noted above.   1421 I rechecked the patient, sedated her, reduced her wrist, and applied a splint.    Consults:  1412 I spoke with Dr. Cramer the hospitalist.    Interventions:  1040 Dilaudid, 0.5 mg, IV  1218 Dilaudid, 0.5 mg, IV  1414 Zofran, 4 mg, IV  1428 Diprivan, 120 mcg/kg/min, IV    Disposition:  The patient was admitted to the hospital under the care of Dr. Cramer.     Impression & Plan   Medical Decision Making:  This is a 66-year-old female who came in by ambulance after she slipped and fell on the ice, injuring her low back region and right wrist.  She had an obvious deformity on exam to her right wrist, and the fractures were confirmed by x-ray.  I was also concerned about the possibility of a lower spine fracture, and she does appear to have a T11 compression fracture as well as a likely sacral fracture.  I subsequently reduced her wrist fracture per the above procedure notes.  She was placed in a plaster sugar tong splint.  She will likely need surgery for that.  Unfortunately, due to all of her injuries, she requires admission to the hospital.  I subsequently spoke with Dr. Cramer, who will be admitting her.      Diagnosis:    ICD-10-CM    1. Closed fracture of distal end of right radius, unspecified fracture morphology, initial encounter  S52.501A    2. Compression fracture of T11 vertebra, initial encounter (H)  S22.080A    3. Closed displaced fracture of styloid process of  right ulna, initial encounter  S52.611A    4. Likely closed fracture of sacrum, unspecified portion of sacrum, initial encounter (H)  S32.10XA          Scribe Disclosure:  SABAS, Vanna Guerrier, am serving as a scribe at 10:29 AM on 2/15/2022 to document services personally performed by Medardo Youngblood MD based on my observations and the provider's statements to me.      Medardo Youngblood MD  02/15/22 [id number]

## 2022-02-15 NOTE — PROGRESS NOTES
RECEIVING UNIT ED HANDOFF REVIEW    ED Nurse Handoff Report was reviewed by: Ammy Whitten RN on February 15, 2022 at 5:05 PM

## 2022-02-16 ENCOUNTER — APPOINTMENT (OUTPATIENT)
Dept: OCCUPATIONAL THERAPY | Facility: CLINIC | Age: 67
DRG: 501 | End: 2022-02-16
Attending: INTERNAL MEDICINE
Payer: MEDICARE

## 2022-02-16 ENCOUNTER — APPOINTMENT (OUTPATIENT)
Dept: PHYSICAL THERAPY | Facility: CLINIC | Age: 67
DRG: 501 | End: 2022-02-16
Attending: INTERNAL MEDICINE
Payer: MEDICARE

## 2022-02-16 LAB
ANION GAP SERPL CALCULATED.3IONS-SCNC: 5 MMOL/L (ref 3–14)
BUN SERPL-MCNC: 8 MG/DL (ref 7–30)
CALCIUM SERPL-MCNC: 8.5 MG/DL (ref 8.5–10.1)
CHLORIDE BLD-SCNC: 102 MMOL/L (ref 94–109)
CO2 SERPL-SCNC: 26 MMOL/L (ref 20–32)
CREAT SERPL-MCNC: 0.52 MG/DL (ref 0.52–1.25)
ERYTHROCYTE [DISTWIDTH] IN BLOOD BY AUTOMATED COUNT: 13.1 % (ref 10–15)
ERYTHROCYTE [DISTWIDTH] IN BLOOD BY AUTOMATED COUNT: 13.1 % (ref 10–15)
GFR SERPL CREATININE-BSD FRML MDRD: >90 ML/MIN/1.73M2
GLUCOSE BLD-MCNC: 117 MG/DL (ref 70–99)
HCT VFR BLD AUTO: 34.7 % (ref 35–53)
HCT VFR BLD AUTO: 40.2 % (ref 35–53)
HGB BLD-MCNC: 11.5 G/DL (ref 11.7–17.7)
HGB BLD-MCNC: 13.2 G/DL (ref 11.7–17.7)
MCH RBC QN AUTO: 30.6 PG (ref 26.5–33)
MCH RBC QN AUTO: 30.6 PG (ref 26.5–33)
MCHC RBC AUTO-ENTMCNC: 32.8 G/DL (ref 31.5–36.5)
MCHC RBC AUTO-ENTMCNC: 33.1 G/DL (ref 31.5–36.5)
MCV RBC AUTO: 92 FL (ref 78–100)
MCV RBC AUTO: 93 FL (ref 78–100)
PLATELET # BLD AUTO: 252 10E3/UL (ref 150–450)
PLATELET # BLD AUTO: 267 10E3/UL (ref 150–450)
POTASSIUM BLD-SCNC: 4 MMOL/L (ref 3.4–5.3)
RBC # BLD AUTO: 3.76 10E6/UL (ref 3.8–5.9)
RBC # BLD AUTO: 4.31 10E6/UL (ref 3.8–5.9)
SODIUM SERPL-SCNC: 133 MMOL/L (ref 133–144)
WBC # BLD AUTO: 6.8 10E3/UL (ref 4–11)
WBC # BLD AUTO: 6.9 10E3/UL (ref 4–11)

## 2022-02-16 PROCEDURE — 82310 ASSAY OF CALCIUM: CPT | Performed by: PHYSICIAN ASSISTANT

## 2022-02-16 PROCEDURE — 250N000013 HC RX MED GY IP 250 OP 250 PS 637: Performed by: PHYSICIAN ASSISTANT

## 2022-02-16 PROCEDURE — 36415 COLL VENOUS BLD VENIPUNCTURE: CPT | Performed by: INTERNAL MEDICINE

## 2022-02-16 PROCEDURE — 97116 GAIT TRAINING THERAPY: CPT | Mod: GP

## 2022-02-16 PROCEDURE — 97530 THERAPEUTIC ACTIVITIES: CPT | Mod: GP

## 2022-02-16 PROCEDURE — 250N000011 HC RX IP 250 OP 636: Performed by: INTERNAL MEDICINE

## 2022-02-16 PROCEDURE — 250N000013 HC RX MED GY IP 250 OP 250 PS 637

## 2022-02-16 PROCEDURE — 250N000013 HC RX MED GY IP 250 OP 250 PS 637: Performed by: INTERNAL MEDICINE

## 2022-02-16 PROCEDURE — 99233 SBSQ HOSP IP/OBS HIGH 50: CPT | Performed by: PHYSICIAN ASSISTANT

## 2022-02-16 PROCEDURE — 85027 COMPLETE CBC AUTOMATED: CPT | Performed by: INTERNAL MEDICINE

## 2022-02-16 PROCEDURE — 85027 COMPLETE CBC AUTOMATED: CPT | Performed by: PHYSICIAN ASSISTANT

## 2022-02-16 PROCEDURE — 36415 COLL VENOUS BLD VENIPUNCTURE: CPT | Performed by: PHYSICIAN ASSISTANT

## 2022-02-16 PROCEDURE — 120N000001 HC R&B MED SURG/OB

## 2022-02-16 PROCEDURE — 97166 OT EVAL MOD COMPLEX 45 MIN: CPT | Mod: GO | Performed by: OCCUPATIONAL THERAPIST

## 2022-02-16 PROCEDURE — 97162 PT EVAL MOD COMPLEX 30 MIN: CPT | Mod: GP

## 2022-02-16 PROCEDURE — 250N000013 HC RX MED GY IP 250 OP 250 PS 637: Performed by: HOSPITALIST

## 2022-02-16 PROCEDURE — 97535 SELF CARE MNGMENT TRAINING: CPT | Mod: GO | Performed by: OCCUPATIONAL THERAPIST

## 2022-02-16 RX ORDER — ACETAMINOPHEN 325 MG/1
975 TABLET ORAL EVERY 6 HOURS PRN
Status: DISCONTINUED | OUTPATIENT
Start: 2022-02-16 | End: 2022-02-19 | Stop reason: HOSPADM

## 2022-02-16 RX ORDER — OXYCODONE HYDROCHLORIDE 5 MG/1
5-10 TABLET ORAL EVERY 4 HOURS PRN
Status: DISCONTINUED | OUTPATIENT
Start: 2022-02-16 | End: 2022-02-19 | Stop reason: HOSPADM

## 2022-02-16 RX ORDER — POLYETHYLENE GLYCOL 3350 17 G/17G
17 POWDER, FOR SOLUTION ORAL DAILY
Status: DISCONTINUED | OUTPATIENT
Start: 2022-02-16 | End: 2022-02-17

## 2022-02-16 RX ORDER — TRAMADOL HYDROCHLORIDE 50 MG/1
50 TABLET ORAL EVERY 6 HOURS PRN
Status: DISCONTINUED | OUTPATIENT
Start: 2022-02-16 | End: 2022-02-17

## 2022-02-16 RX ORDER — AMOXICILLIN 250 MG
1 CAPSULE ORAL 2 TIMES DAILY
Status: DISCONTINUED | OUTPATIENT
Start: 2022-02-16 | End: 2022-02-16

## 2022-02-16 RX ADMIN — HYDROMORPHONE HYDROCHLORIDE 0.2 MG: 0.2 INJECTION, SOLUTION INTRAMUSCULAR; INTRAVENOUS; SUBCUTANEOUS at 02:07

## 2022-02-16 RX ADMIN — Medication 1 CAPSULE: at 09:27

## 2022-02-16 RX ADMIN — CEPHALEXIN 500 MG: 500 CAPSULE ORAL at 08:08

## 2022-02-16 RX ADMIN — OXYCODONE HYDROCHLORIDE 10 MG: 5 TABLET ORAL at 20:03

## 2022-02-16 RX ADMIN — ACETAMINOPHEN 650 MG: 325 TABLET, FILM COATED ORAL at 02:20

## 2022-02-16 RX ADMIN — OXYCODONE HYDROCHLORIDE 5 MG: 5 TABLET ORAL at 08:08

## 2022-02-16 RX ADMIN — OXYCODONE HYDROCHLORIDE 5 MG: 5 TABLET ORAL at 12:38

## 2022-02-16 RX ADMIN — SENNOSIDES AND DOCUSATE SODIUM 1 TABLET: 50; 8.6 TABLET ORAL at 08:08

## 2022-02-16 RX ADMIN — ZOLPIDEM TARTRATE 5 MG: 5 TABLET ORAL at 01:24

## 2022-02-16 RX ADMIN — Medication 600 MG: at 09:27

## 2022-02-16 RX ADMIN — SENNOSIDES AND DOCUSATE SODIUM 2 TABLET: 50; 8.6 TABLET ORAL at 20:03

## 2022-02-16 RX ADMIN — CEPHALEXIN 500 MG: 500 CAPSULE ORAL at 20:03

## 2022-02-16 RX ADMIN — ACETAMINOPHEN 975 MG: 325 TABLET, FILM COATED ORAL at 15:40

## 2022-02-16 RX ADMIN — Medication 50 MCG: at 09:27

## 2022-02-16 RX ADMIN — POLYETHYLENE GLYCOL 3350 17 G: 17 POWDER, FOR SOLUTION ORAL at 12:38

## 2022-02-16 RX ADMIN — ROSUVASTATIN CALCIUM 5 MG: 5 TABLET, FILM COATED ORAL at 20:03

## 2022-02-16 RX ADMIN — ONDANSETRON 4 MG: 4 TABLET, ORALLY DISINTEGRATING ORAL at 02:20

## 2022-02-16 ASSESSMENT — ACTIVITIES OF DAILY LIVING (ADL)
PREVIOUS_RESPONSIBILITIES: MEAL PREP;HOUSEKEEPING;LAUNDRY;SHOPPING;YARDWORK;MEDICATION MANAGEMENT;FINANCES;DRIVING
ADLS_ACUITY_SCORE: 14
ADLS_ACUITY_SCORE: 11
FALL_HISTORY_WITHIN_LAST_SIX_MONTHS: NO
ADLS_ACUITY_SCORE: 15
DIFFICULTY_EATING/SWALLOWING: NO
ADLS_ACUITY_SCORE: 11
ADLS_ACUITY_SCORE: 15
ADLS_ACUITY_SCORE: 14
EQUIPMENT_CURRENTLY_USED_AT_HOME: WALKER, STANDARD
ADLS_ACUITY_SCORE: 14
ADLS_ACUITY_SCORE: 15
ADLS_ACUITY_SCORE: 14
ADLS_ACUITY_SCORE: 15
ADLS_ACUITY_SCORE: 14
ADLS_ACUITY_SCORE: 14
ADLS_ACUITY_SCORE: 15
ADLS_ACUITY_SCORE: 13
ADLS_ACUITY_SCORE: 14
ADLS_ACUITY_SCORE: 15
ADLS_ACUITY_SCORE: 15
DOING_ERRANDS_INDEPENDENTLY_DIFFICULTY: NO
ADLS_ACUITY_SCORE: 14
TOILETING_ISSUES: NO
CONCENTRATING,_REMEMBERING_OR_MAKING_DECISIONS_DIFFICULTY: NO
ADLS_ACUITY_SCORE: 14
DRESSING/BATHING_DIFFICULTY: NO
ADLS_ACUITY_SCORE: 11
ADLS_ACUITY_SCORE: 15
WEAR_GLASSES_OR_BLIND: YES
ADLS_ACUITY_SCORE: 14
WALKING_OR_CLIMBING_STAIRS_DIFFICULTY: NO

## 2022-02-16 NOTE — PROGRESS NOTES
Pt is AOx4, up SBA in room. Walks to bathroom, has a hat in toilet due to urinary retention but voiding spontaneously. Last PVR 276cc; orders to insert Zendejas if retention continues.  PRN tylenol and oxy given which controls pain well. Pt states she is nauseous but it gets better with pain medications. IVF discontinued. Straight cath x1 1100 output. Oral abx 2x/day continues.

## 2022-02-16 NOTE — PROGRESS NOTES
02/16/22 1600   Quick Adds   Type of Visit Initial Occupational Therapy Evaluation   Living Environment   People in Home spouse   Current Living Arrangements house   Home Accessibility stairs to enter home   Number of Stairs, Main Entrance 2   Stair Railings, Main Entrance none   Transportation Anticipated family or friend will provide;car, drives self   Living Environment Comments Pt lives with spouse in one level home, all needs except laundry met on main level. Had walk-in shower with grab bars and comfort height toilet. Reports she has a toilet safety frame that her mother used   Self-Care   Usual Activity Tolerance good   Current Activity Tolerance moderate   Regular Exercise Yes   Activity/Exercise Type walking   Fall history within last six months yes   Number of times patient has fallen within last six months 1   Activity/Exercise/Self-Care Comment Pt reports IND at baseline with mobility without AD and IND with I/ADLs   Instrumental Activities of Daily Living (IADL)   Previous Responsibilities meal prep;housekeeping;laundry;shopping;yardwork;medication management;finances;driving   General Information   Onset of Illness/Injury or Date of Surgery 02/15/22   Referring Physician Gil Cramer MD   Additional Occupational Profile Info/Pertinent History of Current Problem 65 yo patient admitted following a fall, found to have a T11 compression fracture, R distal radius fracture, and sacral fracture (acute vs subacute S3-4)   Existing Precautions/Restrictions fall;spinal   Right Upper Extremity (Weight-bearing Status)   (anticipate NWB)   Left Lower Extremity (Weight-bearing Status) weight-bearing as tolerated (WBAT)   Right Lower Extremity (Weight-bearing Status) weight-bearing as tolerated (WBAT)   Cognitive Status Examination   Affect/Mental Status (Cognitive) WFL   Follows Commands WFL   Visual Perception   Visual Impairment/Limitations WFL   Sensory   Sensory Quick Adds No deficits were  identified   Pain Assessment   Patient Currently in Pain Yes, see Vital Sign flowsheet  (4/10)   Integumentary/Edema   Integumentary/Edema no deficits were identifed   Integumentary/Edema Comments   (Splint and ace wrap to R UE)   Posture   Posture not impaired   Range of Motion Comprehensive   Comment, General Range of Motion L UE WFL, R UE elbow, wrist, and finger ROM impacted by splint 2/2 distal radius fx. General mobility limited by back pain   Strength Comprehensive (MMT)   Comment, General Manual Muscle Testing (MMT) Assessment Generalized weakness   Muscle Tone Assessment   Muscle Tone Quick Adds No deficits were identified   Coordination   Upper Extremity Coordination No deficits were identified   Bed Mobility   Bed Mobility supine-sit;sit-supine   Supine-Sit [place] (Bed Mobility) contact guard;verbal cues   Sit-Supine [place] (Bed Mobility) minimum assist (75% patient effort);verbal cues   Assistive Device (Bed Mobility) bed rails   Transfers   Transfers sit-stand transfer;toilet transfer   Sit-Stand Transfer   Sit-Stand [place] (Transfers) contact guard;verbal cues   Assistive Device (Sit-Stand Transfers) walker, platform   Toilet Transfer   Type (Toilet Transfer) sit-stand;stand-sit   [place] Level (Toilet Transfer) contact guard;verbal cues   Assistive Device (Toilet Transfer) grab bars/safety frame   Activities of Daily Living   BADL Assessment/Intervention lower body dressing;toileting   Lower Body Dressing Assessment/Training   [place] Level (Lower Body Dressing) moderate assist (50% patient effort)   Toileting   [place] Level (Toileting) contact guard assist   Assistive Devices (Toileting) grab bar, toilet   Clinical Impression   Criteria for Skilled Therapeutic Interventions Met (OT) Yes, treatment indicated   OT Diagnosis Impaired independence with functional mobility and I/ADLs   OT Problem List-Impairments impacting ADL problems related to;activity tolerance  impaired;mobility;range of motion (ROM);strength;pain   Assessment of Occupational Performance 5 or more Performance Deficits   Identified Performance Deficits functional mobility, toileting, dressing, grooming, eatng, bathing   Planned Therapy Interventions (OT) ADL retraining;transfer training;ROM   Clinical Decision Making Complexity (OT) moderate complexity   Anticipated Equipment Needs Upon Discharge (OT) shower chair   Risk & Benefits of therapy have been explained evaluation/treatment results reviewed;care plan/treatment goals reviewed;risks/benefits reviewed;current/potential barriers reviewed;participants voiced agreement with care plan;participants included;patient   OT Discharge Planning   OT Discharge Recommendation (DC Rec) home with assist;home with home care occupational therapy   OT Rationale for DC Rec Pt limited by pain but mobilizing well, overall CGA with transfers and ambulation with platform FWW. Pt limited by R distal radius fx and is R hand dominant, states her spouse can assist with I/ADLs as needed. Pt would benefit from shower chair and adaptive tools for eating   Total Evaluation Time (Minutes)   Total Evaluation Time (Minutes) 10   OT Goals   Therapy Frequency (OT) Daily   OT Predicated Duration/Target Date for Goal Attainment 02/18/22   OT Goals Hygiene/Grooming;Transfers;Toilet Transfer/Toileting;Lower Body Dressing;Upper Body Dressing   OT: Hygiene/Grooming using adaptive equipment;supervision/stand-by assist;within precautions;while standing   OT: Upper Body Dressing Moderate assist;including set-up/clothing retrieval   OT: Lower Body Dressing Minimal assist;using adaptive equipment;within precautions;including set-up/clothing retrieval   OT: Transfer Modified independent;with assistive device;within precautions   OT: Toilet Transfer/Toileting Modified independent;toilet transfer;cleaning and garment management;using adaptive equipment;within precautions

## 2022-02-16 NOTE — PROGRESS NOTES
Pt came from Ed @ 1700 with R wrist fracture and Compression fracture of T11 vertebra. A/O x4. VSS, except increase in BP. On RA. Splint on R forearm. Skin check; no open skin or bruises noted. IV NS @ 75ml/hr infusing. Iv dilaudid 0.2 mg given for pain 7/10. Reg diet. Was Covid+ last month, now on Covid precaution. [place] and with hearing aids. Due to back pain, pt placed on purewick, but having difficulties peeing.  at bedside.

## 2022-02-16 NOTE — CONSULTS
[facility]    Orthopedic Consultation    Justus Lozano MRN# [id number]   Age: 66 year old YOB: 1955     Date of Admission: 2/15/2022    Reason for consult: Right distal radius fracture  T11 compression fracture  Sacral fracture (acute vs subacute S3-4)       Requesting provider: Gil Cramer       Level of consult: One-time consult to assist in determining a diagnosis, recommend an appropriate treatment plan and place orders           Assessment and Plan:   Assessment:   Right distal radius fracture  T11 compression fracture  Sacral fracture (acute vs subacute S3-4)      Plan:   Non-operative management recommended  Keep splint applied to right wrist.  Elevate when at rest.  Max lift: coffee cup  WBAT Bilateral LEs with walker  Brace not recommended at this time per Dr Perez   Follow-up with Dr Rojas regarding right wrist and Dr Perez regarding compression fracture in 1 week.  [facility] number: [phone]           Chief Complaint:   Wrist and back pain          History of Present Illness:   This patient is a 66 year old adult who presented to the ED following a fall.  Patient slipped on the ice and fell landing on her right wrist and buttocks.  She had immediate pain and difficulty ambulating.  Her distal radius fracture was reduced and splinted in the ED.  Imaging also revealed a T11 compression fracture and acute vs subacute S3 fracture.            Past Medical History:     Past Medical History:   Diagnosis Date     Arthritis     bilateral thumbs     Disorder of bone and cartilage, unspecified      Hearing loss     BILAT HEARING AIDS     Hernia, inguinal      Insomnia      Metrorrhagia      Migraine, unspecified, without mention of intractable migraine without mention of status migrainosus      Thoracic or lumbosacral neuritis or radiculitis, unspecified      Urticaria, unspecified      Viral warts, unspecified              Past Surgical History:     Past Surgical  History:   Procedure Laterality Date     ABDOMEN SURGERY  5-    Robotic hysterectomy     ABDOMINAL SACROCOLPOPEXY       HC REMOVE TONSILS/ADENOIDS,12+ Y/O  10 TH GRADE     LAPAROSCOPIC HERNIORRHAPHY FEMORAL Bilateral 12/7/2015    Procedure: LAPAROSCOPIC HERNIORRHAPHY FEMORAL;  Surgeon: Tian Mata MD;  Location: [facility]     PERINEOPLASTY       RECTOCELE REPAIR       ELISA BSO               Social History:     Social History     Tobacco Use     Smoking status: Never Smoker     Smokeless tobacco: Never Used   Substance Use Topics     Alcohol use: No             Family History:     Family History   Problem Relation Age of Onset     Hypertension Mother      Osteoporosis Mother      Heart Disease Father      Cerebrovascular Disease Father              Immunizations:     VACCINE/DOSE   Diptheria   DPT   DTAP   HBIG   Hepatitis A   Hepatitis B   HIB   Influenza   Measles   Meningococcal   MMR   Mumps   Pneumococcal   Polio   Rubella   Small Pox   TDAP   Varicella   Zoster             Allergies:     Allergies   Allergen Reactions     Celebrex [Celecoxib] Hives     Sulfamethoxazole Hives             Medications:     Current Facility-Administered Medications   Medication     acetaminophen (TYLENOL) tablet 650 mg    Or     acetaminophen (TYLENOL) Suppository 650 mg     calcium carbonate (OS-HANDY) tablet 600 mg     cephALEXin (KEFLEX) capsule 500 mg     HYDROmorphone (DILAUDID) injection 0.2 mg     lidocaine (LMX4) cream     lidocaine 1 % 0.1-1 mL     magnesium hydroxide (MILK OF MAGNESIA) suspension 30 mL     melatonin tablet 1 mg     multivitamin  with lutein (OCUVITE WITH LUTEIN) per capsule 1 capsule     naloxone (NARCAN) injection 0.2 mg    Or     naloxone (NARCAN) injection 0.4 mg    Or     naloxone (NARCAN) injection 0.2 mg    Or     naloxone (NARCAN) injection 0.4 mg     ondansetron (ZOFRAN-ODT) ODT tab 4 mg    Or     ondansetron (ZOFRAN) injection 4 mg     oxyCODONE (ROXICODONE) tablet 5-10 mg      "rosuvastatin (CRESTOR) tablet 5 mg     senna-docusate (SENOKOT-S/PERICOLACE) 8.6-50 MG per tablet 1 tablet    Or     senna-docusate (SENOKOT-S/PERICOLACE) 8.6-50 MG per tablet 2 tablet     sodium chloride (PF) 0.9% PF flush 3 mL     sodium chloride (PF) 0.9% PF flush 3 mL     sodium chloride 0.9% infusion     vitamin D3 (CHOLECALCIFEROL) tablet 50 mcg     zolpidem (AMBIEN) tablet 5 mg             Review of Systems:   ROS:  10 point ROS neg other than the symptoms noted above in the HPI.            Physical Exam:   All vitals have been reviewed  Patient Vitals for the past 24 hrs:   BP Temp Temp src Pulse Resp SpO2 Height Weight   02/16/22 0500 127/66 98.3  F (36.8  C) Oral 74 16 95 % -- --   02/16/22 0210 -- 99.2  F (37.3  C) -- -- -- -- -- --   02/15/22 2323 -- -- -- -- -- 96 % -- --   02/15/22 2143 -- -- -- -- 16 -- -- --   02/15/22 2100 (!) 159/78 99.6  F (37.6  C) Oral 76 16 96 % -- --   02/15/22 1709 (!) 150/65 98.2  F (36.8  C) Axillary 82 16 99 % -- --   02/15/22 1630 134/72 -- -- 85 16 97 % -- --   02/15/22 1600 (!) 149/77 -- -- 81 18 98 % -- --   02/15/22 1500 132/66 -- -- 80 16 96 % -- --   02/15/22 1435 (!) 149/71 -- -- 80 15 100 % -- --   02/15/22 1430 (!) 151/76 -- -- 83 (!) 0 100 % -- --   02/15/22 1425 (!) 150/80 -- -- 88 15 100 % -- --   02/15/22 1420 (!) 158/69 -- -- 91 (!) 40 99 % -- --   02/15/22 1415 (!) 149/77 -- -- 84 (!) 36 99 % -- --   02/15/22 1300 127/78 -- -- 74 -- 96 % -- --   02/15/22 1215 (!) 151/75 -- -- 73 16 98 % -- --   02/15/22 1030 (!) 168/110 98.2  F (36.8  C) Oral 87 18 100 % 1.778 m (5' 10\") 61.2 kg (135 lb)       Intake/Output Summary (Last 24 hours) at 2/16/2022 0758  Last data filed at 2/16/2022 0235  Gross per 24 hour   Intake --   Output 1375 ml   Net -1375 ml         Physical Exam   Temp: 98.3  F (36.8  C) Temp src: Oral BP: 127/66 Pulse: 74   Resp: 16 SpO2: 95 % O2 Device: None (Room air)    Vital Signs with Ranges  Temp:  [98.2  F (36.8  C)-99.6  F (37.6  C)] 98.3  F " (36.8  C)  Pulse:  [73-91] 74  Resp:  [0-40] 16  BP: (127-168)/() 127/66  SpO2:  [95 %-100 %] 95 %  135 lbs 0 oz    Constitutional: Pleasant, alert, appropriate, following commands.  HEENT: Head atraumatic normocephalic. Pupils equal round and reactive to light.  Respiratory: Unlabored breathing no audible wheeze  Cardiovascular: Regular rate and rhythm per pulses  GI: Abdomen non-distended.  Lymph/Hematologic: No lymphadenopathy in areas examined  Genitourinary:  No rendon  Skin: No rashes, no cyanosis, no edema.  Musculoskeletal: Right wrist is in a splint, flexed position.  Patient is able to flex and extend fingers without difficulty.  Reports equal sensation bilaterally.  Capillary refill is less than 2 seconds bilaterally.  On exam of her bilateral lower extremities, skin is intact.  There is no ecchymosis or erythema.  Denies any change in sensation to light touch in the left versus right lower extremities.  Distal pulses are intact equal bilaterally.  She has equal strength with resisted great toe extension, plantarflexion, knee flexion extension.  Patient denies pain with bilateral hip rotation.  Straight leg raise is negative for any radiculopathy.  Neurologic: normal without focal findings, mental status, speech normal, alert and oriented x iii  Neuropsychiatric: stable             Data:   All laboratory data reviewed  Results for orders placed or performed during the hospital encounter of 02/15/22   -Fracture     Status: None    Narrative    Medardo Youngblood MD     2/15/2022  4:15 PM  [facility]    -Fracture    Date/Time: 2/15/2022 2:21 PM  Performed by: Medardo Youngblood MD  Authorized by: Medardo Youngblood MD     Risks, benefits and alternatives discussed.    ED EVALUATION:      I have performed an Emergency Department Evaluation including taking a   history and physical examination, this evaluation will be documented in   the electronic medical record for this ED  encounter.      ASA Class: Class 2- mild systemic disease, no acute problems, no   functional limitations    Mallampati: Grade 1- soft palate, uvula, tonsillar pillars, and   posterior pharyngeal wall visible    UNIVERSAL PROTOCOL   Site Marked: Yes  Prior Images Obtained and Reviewed:  Yes  Required items: Required blood products, implants, devices and special   equipment available    Patient identity confirmed:  Verbally with patient  Patient was reevaluated immediately before administering moderate or deep   sedation or anesthesia  Confirmation Checklist:  Patient's identity using two indicators  Time out: Immediately prior to the procedure a time out was called    Universal Protocol: the Joint Commission Universal Protocol was followed    Preparation: Patient was prepped and draped in usual sterile fashion      INJURY      Injury location:  Forearm    Forearm fracture type: distal radius and ulnar styloid      PRE PROCEDURE ASSESSMENT      Neurological function: normal      Distal perfusion: normal      Range of motion: reduced      SEDATION  Patient Sedated: Yes    Sedation Type:  Deep  Sedation:  Propofol  Vital signs: Vital signs monitored during sedation      ANESTHESIA (see MAR for exact dosages)      Anesthesia method:  None    PROCEDURE DETAILS:     Manipulation performed: yes      Skeletal traction used: yes      Reduction successful: yes      X-ray confirmed reduction: yes      Immobilization:  Splint    Splint type:  Sugar tong    Supplies used:  Plaster    POST PROCEDURE ASSESSMENT      Neurological function: normal      Distal perfusion: normal      Range of motion: unchanged        PROCEDURE    Patient Tolerance:  Patient tolerated the procedure well with no immediate   complications  Length of time physician/provider present for 1:1 monitoring during   sedation: 10   XR Wrist Right 3 Views     Status: None (Preliminary result)    Narrative    WRIST RIGHT THREE OR MORE VIEWS  DATE/TIME: 2/15/2022  12:14 PM     INDICATION: Right-sided wrist pain after a fall.   COMPARISON: None.      Impression    IMPRESSION:  1.  Transverse fracture of the right radius distal metaphysis.  Moderate dorsal displacement, mild impaction, and mild volar apex  angulation. The distal articular surface remains congruent.  2.  Oblique mildly displaced fracture of the ulnar styloid base.  3.  Mild ulna positive variance.  4.  Trapeziectomy.  5.  Instrumented arthrodesis of the thumb MCP joint.   XR Lumbar Spine 2/3 Views     Status: None    Narrative    LUMBAR SPINE TWO - THREE VIEWS 2/15/2022 12:14 PM     COMPARISON: None.    HISTORY: Fall, pain.      Impression    IMPRESSION: Five lumbar type vertebrae. Grade 1 degenerative  anterolisthesis of L4 upon L5. Alignment otherwise normal. Vertebral  body heights of the lumbar spine are normal. There is age  indeterminate mild anterior wedge compression fracture deformity of  the T11 vertebral body. No definite recent fractures. Facet  arthropathy at L3-L4, L4-L5 and L5-S1. Loss of disc space height and  degenerative endplate spurring at L2-L3, L4-L5 and L5-S1. Questionable  lucency at the S3-S4 level of the sacrum. Dedicated sacral imaging  recommended to exclude fracture.    ROS DOMINGUEZ MD         SYSTEM ID:  [id number]   CT Lumbar Spine w/o Contrast     Status: None    Narrative    CT OF THE LUMBAR SPINE WITHOUT CONTRAST  2/15/2022 1:24 PM     COMPARISON: Lumbar spine x-ray series same day.    HISTORY: Fall, low back pain.     TECHNIQUE: Axial images of the lumbar spine were acquired without  intravenous contrast. Multiplanar reformations were created from the  axial source images.        Impression    IMPRESSION: Moderate anterior wedge compression fracture deformity of  the T11 vertebral body again noted and appears recent  (acute-subacute). Linear lucency through the sacrum at the S3-S4  junction is again noted and also likely represents an acute or  subacute nondisplaced fracture.  Degenerative grade 1 anterolisthesis  of L4 upon L5 again noted. Alignment otherwise normal. No other  fractures. Mild degenerative spinal canal narrowing at L4-L5. No other  spinal canal narrowing of the lumbar spine. No significant neural  foraminal stenosis at any level of the lumbar spine.      Radiation dose for this scan was reduced using automated exposure  control, adjustment of the mA and/or kV according to patient size, or  iterative reconstruction technique    ROS DOMINGUEZ MD         SYSTEM ID:  [id number]   XR Wrist Port Right 2 Views     Status: None    Narrative    Examination:  XR WRIST PORTABLE RIGHT 2 VIEWS    Date:  2/15/2022 3:15 PM     Clinical Information: post reduction    Comparison: 2/15/2022 at 1200 hours.      Impression    Impression:    1.  Interval closed reduction and splinting of the distal radius and  ulnar fractures. Fractures are in near anatomic position and  alignment. No new bone or joint abnormality identified.    ALLAN MENDEZ MD         SYSTEM ID:  [id number]   INR     Status: Normal   Result Value Ref Range    INR 1.08 0.85 - 1.15   Basic metabolic panel     Status: Abnormal   Result Value Ref Range    Sodium 134 133 - 144 mmol/L    Potassium 4.2 3.4 - 5.3 mmol/L    Chloride 105 94 - 109 mmol/L    Carbon Dioxide (CO2) 22 20 - 32 mmol/L    Anion Gap 7 3 - 14 mmol/L    Urea Nitrogen 12 7 - 30 mg/dL    Creatinine 0.49 (L) 0.52 - 1.25 mg/dL    Calcium 8.6 8.5 - 10.1 mg/dL    Glucose 102 (H) 70 - 99 mg/dL    GFR Estimate >90 >60 mL/min/1.73m2    Narrative    The sex of this patient cannot be reliably determined based on discrepancies in demographics (legal sex, sex assigned at birth, gender identity).  Both male and female reference ranges are provided where applicable.  Careful evaluation of the patient s results as compared to the gender specific reference intervals is required in this setting.    Asymptomatic COVID-19 Virus (Coronavirus) by PCR Nasopharyngeal     Status:  Abnormal    Specimen: Nasopharyngeal; Swab   Result Value Ref Range    SARS CoV2 PCR Positive (A) Negative    Narrative    Testing was performed using the anat  SARS-CoV-2 & Influenza A/B Assay on the anat  [employer]  System.  This test should be ordered for the detection of SARS-COV-2 in individuals who meet SARS-CoV-2 clinical and/or epidemiological criteria. Test performance is unknown in asymptomatic patients.  This test is for in vitro diagnostic use under the FDA EUA for laboratories certified under CLIA to perform moderate and/or high complexity testing. This test has not been FDA cleared or approved.  A negative test does not rule out the presence of PCR inhibitors in the specimen or target RNA in concentration below the limit of detection for the assay. The possibility of a false negative should be considered if the patient's recent exposure or clinical presentation suggests COVID-19.  [facility] are certified under the Clinical Laboratory Improvement Amendments of 1988 (CLIA-88) as qualified to perform moderate and/or high complexity laboratory testing.   CBC with platelets and differential     Status: Abnormal   Result Value Ref Range    WBC Count 11.7 (H) 4.0 - 11.0 10e3/uL    RBC Count 4.46 3.80 - 5.90 10e6/uL    Hemoglobin 13.6 11.7 - 17.7 g/dL    Hematocrit 41.6 35.0 - 53.0 %    MCV 93 78 - 100 fL    MCH 30.5 26.5 - 33.0 pg    MCHC 32.7 31.5 - 36.5 g/dL    RDW 13.2 10.0 - 15.0 %    Platelet Count 235 150 - 450 10e3/uL    % Neutrophils 84 %    % Lymphocytes 9 %    % Monocytes 6 %    % Eosinophils 0 %    % Basophils 0 %    % Immature Granulocytes 1 %    NRBCs per 100 WBC 0 <1 /100    Absolute Neutrophils 9.9 (H) 1.6 - 8.3 10e3/uL    Absolute Lymphocytes 1.0 0.8 - 5.3 10e3/uL    Absolute Monocytes 0.7 0.0 - 1.3 10e3/uL    Absolute Eosinophils 0.0 0.0 - 0.7 10e3/uL    Absolute Basophils 0.0 0.0 - 0.2 10e3/uL    Absolute Immature Granulocytes 0.1 <=0.4 10e3/uL    Absolute NRBCs 0.0 10e3/uL     Narrative    The sex of this patient cannot be reliably determined based on discrepancies in demographics (legal sex, sex assigned at birth, gender identity).  Both male and female reference ranges are provided where applicable.  Careful evaluation of the patient s results as compared to the gender specific reference intervals is required in this setting.    Glucose by meter     Status: Abnormal   Result Value Ref Range    GLUCOSE BY METER POCT 115 (H) 70 - 99 mg/dL   CBC with platelets     Status: Abnormal   Result Value Ref Range    WBC Count 6.8 4.0 - 11.0 10e3/uL    RBC Count 3.76 (L) 3.80 - 5.90 10e6/uL    Hemoglobin 11.5 (L) 11.7 - 17.7 g/dL    Hematocrit 34.7 (L) 35.0 - 53.0 %    MCV 92 78 - 100 fL    MCH 30.6 26.5 - 33.0 pg    MCHC 33.1 31.5 - 36.5 g/dL    RDW 13.1 10.0 - 15.0 %    Platelet Count 252 150 - 450 10e3/uL    Narrative    The sex of this patient cannot be reliably determined based on discrepancies in demographics (legal sex, sex assigned at birth, gender identity).  Both male and female reference ranges are provided where applicable.  Careful evaluation of the patient s results as compared to the gender specific reference intervals is required in this setting.    Adult Type and Screen     Status: None   Result Value Ref Range    ABO/RH(D) A POS     Antibody Screen Negative Negative    SPECIMEN EXPIRATION DATE [id number]    CBC with platelets differential     Status: Abnormal    Narrative    The following orders were created for panel order CBC with platelets differential.  Procedure                               Abnormality         Status                     ---------                               -----------         ------                     CBC with platelets and d...[[id number]]  Abnormal            Final result                 Please view results for these tests on the individual orders.   ABO/Rh type and screen     Status: None    Narrative    The following orders were created for  panel order ABO/Rh type and screen.  Procedure                               Abnormality         Status                     ---------                               -----------         ------                     Adult Type and Screen[[id number]]                            Final result                 Please view results for these tests on the individual orders.          Attestation:  I have reviewed today's vital signs, notes, medications, labs and imaging with Dr. Perez.  Amount of time performed on this consult: 30 minutes.    Grisel Bacon PA-C

## 2022-02-16 NOTE — PROVIDER NOTIFICATION
"Dr. Kam paged \"Can we have parameters to stop fluids for this patient? She has been unable to void and has over 1L in her bladder, as of right now. She has been straight catheterized two times since last evening. TY\"    Update: IVF has been discontinued  "

## 2022-02-16 NOTE — PROGRESS NOTES
[facility]  Hospitalist Progress Note  Admission Date:  2/15/2022  Date of Service:  2/16/2022        Assessment and Plan:      This is a 66 year old adult with past medical hx of Covid 1 month ago and recovered, who presented with mechanical fall and right wrist and back pain resulting in compression fx of T11 verebra and right wrist fracture.  The patient was admitted for pain manemgemnt, ortho consult.           Compression Fracture of T11 vertebra, initial encounter  Mechanical Fall  Right Wrist Fracture   Sacral Fracture (?)   PLAN:  -pain regiment: will schedule tylenol, prn tramadol, prn oxycodone; IV for break through pain only   -bowel regiment   -PT/OT  -start incentive spirometry   -appreciate ortho consult; non-operative mangament recommended  -split to R wrist w max life of coffee cup  -WBAT , walker     Urinary Retention in the setting of pain, narcotics, injury  UTI  PLAN:  -continue monitoring, if requires straight cath again will place Zendejas   -continue course of Keflex     Leukocytosis -- probably stress related   PLAN:  -check CBC in the am      Chronic Problems:    COVID-19 Jan 2022, Now Recovered (still positive 2/15/22)              -- no treatment    Osteoporosis              -- continue home meds       DVT Prophylaxis: Pneumatic Compression Devices  Code Status: Full Code  Disposition: home in 1-2 days with  if able to ambulate and OK per PT         Mari Franklin PA-C ([place])   Hospitalist  Pager: [phone]  2:14 PM  February 16, 2022                       Interval History:   Patient having trouble urinating, has urge but can not release  Better pain today but its still 5/10 despite little to no movement   Eating well    present   Her last BM was Monday   ; no cp, sob, n/v/d, or abd pain.              Medications:       calcium carbonate  600 mg Oral Daily     cephALEXin  500 mg Oral BID     multivitamin  with lutein  1 capsule Oral Daily      "rosuvastatin  5 mg Oral QPM     senna-docusate  1 tablet Oral BID    Or     senna-docusate  2 tablet Oral BID     sodium chloride (PF)  3 mL Intracatheter Q8H     vitamin D3  50 mcg Oral Daily     acetaminophen **OR** acetaminophen, HYDROmorphone, lidocaine 4%, lidocaine (buffered or not buffered), magnesium hydroxide, melatonin, naloxone **OR** naloxone **OR** naloxone **OR** naloxone, ondansetron **OR** ondansetron, oxyCODONE, sodium chloride (PF), zolpidem               Physical Exam:     Patient Vitals for the past 24 hrs:   BP Temp Temp src Pulse Resp SpO2 Height Weight   22 0802 (!) 167/97 99.1  F (37.3  C) Oral 95 16 97 % -- --   22 0500 127/66 98.3  F (36.8  C) Oral 74 16 95 % -- --   22 0210 -- 99.2  F (37.3  C) -- -- -- -- -- --   02/15/22 2323 -- -- -- -- -- 96 % -- --   02/15/22 2143 -- -- -- -- 16 -- -- --   02/15/22 2100 (!) 159/78 99.6  F (37.6  C) Oral 76 16 96 % -- --   02/15/22 1709 (!) 150/65 98.2  F (36.8  C) Axillary 82 16 99 % -- --   02/15/22 1630 134/72 -- -- 85 16 97 % -- --   02/15/22 1600 (!) 149/77 -- -- 81 18 98 % -- --   02/15/22 1500 132/66 -- -- 80 16 96 % -- --   02/15/22 1435 (!) 149/71 -- -- 80 15 100 % -- --   02/15/22 1430 (!) 151/76 -- -- 83 (!) 0 100 % -- --   02/15/22 1425 (!) 150/80 -- -- 88 15 100 % -- --   02/15/22 1420 (!) 158/69 -- -- 91 (!) 40 99 % -- --   02/15/22 1415 (!) 149/77 -- -- 84 (!) 36 99 % -- --   02/15/22 1300 127/78 -- -- 74 -- 96 % -- --   02/15/22 1215 (!) 151/75 -- -- 73 16 98 % -- --   02/15/22 1030 (!) 168/110 98.2  F (36.8  C) Oral 87 18 100 % 1.778 m (5' 10\") 61.2 kg (135 lb)     Wt Readings from Last 4 Encounters:   02/15/22 61.2 kg (135 lb)   12/07/15 61.2 kg (135 lb)   12/07/15 60.9 kg (134 lb 3.2 oz)   11/30/15 61.2 kg (135 lb)         Vital Sign Ranges  Temperature Temp  Av.8  F (37.1  C)  Min: 98.2  F (36.8  C)  Max: 99.6  F (37.6  C)   Blood pressure Systolic (24hrs), Av , Min:127 , Max:168        Diastolic (24hrs), " Av, Min:65, Max:110      Pulse Pulse  Av.2  Min: 73  Max: 95   Respirations Resp  Av  Min: 0  Max: 40   Pulse oximetry SpO2  Av.9 %  Min: 95 %  Max: 100 %         Intake/Output Summary (Last 24 hours) at 2022 0824  Last data filed at 2022 0235  Gross per 24 hour   Intake --   Output 1375 ml   Net -1375 ml       Constitutional:   awake, alert, cooperative, no apparent distress, and appears stated age     Neck:   Supple, symmetrical, trachea midline, no adenopathy, thyroid symmetric, not enlarged and no tenderness, skin normal     Lungs:   No increased work of breathing, good air exchange, clear to auscultation bilaterally, no crackles or wheezing     Cardiovascular:   regular rate and rhythm, normal S1 and S2, no S3 or S4, and no murmur noted     Abdomen:   + bowel sounds, soft, non-distended, non-tender, no masses palpated, no hepatosplenomegally     Extremities  No edema, cyanosis or clubbing   R wrist in split              Data:     Recent Labs   Lab Test 02/16/22  0642 02/15/22  1249 09/14/16  1836   WBC 6.8 11.7* 10.6   HGB 11.5* 13.6 14.8   MCV 92 93 91    235 314   INR  --  1.08  --       Recent Labs   Lab Test 02/15/22  2120 02/15/22  1249 09/14/16  1836   NA  --  134 134   POTASSIUM  --  4.2 4.0   CHLORIDE  --  105 99   CO2  --   26   BUN  --  12 9   CR  --  0.49* 0.48*   ANIONGAP  --  7 9   HANDY  --  8.6 8.7   * 102* 139*     Recent Labs   Lab Test 02/15/22  2120 02/15/22  1249 16  1836   * 102* 139*

## 2022-02-16 NOTE — PROGRESS NOTES
Writer contacted IP and spoke with Delon, who attests that she will remove the precaution for COVID.

## 2022-02-16 NOTE — PROVIDER NOTIFICATION
"CHADWICK Franklin paged \" Urinary status improving; up with 1 and voiding; PVR shows retention 200+mL. Can you add orders for Zendejas placement? TY\"  "

## 2022-02-16 NOTE — PLAN OF CARE
Pt A/Ox4, VSS on RA, rating pain between 6-8, using PO PRN oxy. PRN Ambien given x1, PRN Imitrix given x1, PO zofran given x1, IV Dilaudid given x1, PRN Tylenol given x1. After giving the Dilaudid, IV infiltrated and was removed & new IV was put in. Pt c/o not being able to urinate, even with purewick, so bladder scanned & straight cath x2, with good output each time. IVF running at 75 mL/hr. Pt had emesis x1 with relief. Continuing UTI abx. Pt is COVID recovered.

## 2022-02-16 NOTE — PROVIDER NOTIFICATION
MD Notification    Notified Person: MD    Notified Person Name: MD Rakesh    Notification Date/Time: 1945 and 2045 2/15/2022    Notification Interaction: paged    Purpose of Notification: page 1) Pt bladder scanned for >500 can we get straight cath orders?  Page 2) can we increase pt oxy to 10mg per pt and  request?     Orders Received: Straight cath orders received and an increase in oxy receieved    Comments: Also inquired about patient having to be on COVID precautions as she had covid January 6th and should be covid recovered- he said she does not need to be on covid precautions.

## 2022-02-16 NOTE — PROGRESS NOTES
02/16/22 1000   Quick Adds   Type of Visit Initial PT Evaluation       Present no   Living Environment   People in Home spouse   Current Living Arrangements house   Home Accessibility stairs to enter home   Number of Stairs, Main Entrance 2   Stair Railings, Main Entrance none   Transportation Anticipated family or friend will provide;car, drives self   Living Environment Comments Patient lives with her  in a single-level house with a lower level walk-out.  There are 2 steps to enter the home with no hand rails.  Patient sleeps in a standard bed and has a walk-in shower with grab bar.  Patient's toilets are comfort height.   Self-Care   Usual Activity Tolerance good   Current Activity Tolerance moderate   Regular Exercise Yes   Activity/Exercise Type walking   Fall history within last six months yes   Number of times patient has fallen within last six months 1   Activity/Exercise/Self-Care Comment Patient reports baseline independence with dressing, bathing, and toileting tasks.  Patient was ambulating community distances without an assistive device.     General Information   Onset of Illness/Injury or Date of Surgery 02/15/22   Referring Physician Gil Cramer MD   Patient/Family Therapy Goals Statement (PT) Patient would like return home on discharge.   Pertinent History of Current Problem (include personal factors and/or comorbidities that impact the POC) 66 year old adult with past medical hx of Covid 1 month ago an is recovered, who presents with fall and right wrist and back pain.   Existing Precautions/Restrictions fall;spinal   Cognition   Affect/Mental Status (Cognition) WFL   Orientation Status (Cognition) oriented x 4   Pain Assessment   Patient Currently in Pain Yes, see Vital Sign flowsheet  (4/10)   Integumentary/Edema   Integumentary/Edema Comments Increased swelling right upper extremity   Posture    Posture Forward head position   Range of Motion (ROM)    Range of Motion ROM deficits secondary to swelling;ROM deficits secondary to pain   ROM Comment Right wrist and hand motion limited by soft splint on right forearm, pain, and swelling.  Spinal flexion, sidebending, and rotation limited by pain from T11 compression fracture.   Strength (Manual Muscle Testing)   Strength (Manual Muscle Testing) Able to perform R SLR;Able to perform L SLR;Deficits observed during functional mobility   Strength Comments Right upper extremity strength limited by right wrist fracture   Bed Mobility   Comment, (Bed Mobility) Patient performs supine > sit transfer with SBA, HOB elevated ~70 degrees and left upper extremity support on bed rail.   Transfers   Comment, (Transfers) Patient performs sit <> stand transfer from EOB x1 and toilet x1 with CGA.  Patient cued to push from left upper extremity during sit > stand, assisted to position right upper extremity on platform attachment of FWW.  During stand > sit, patient cued to reach back for sitting surface with left hand to optimize eccentric control.     Gait/Stairs (Locomotion)   Distance in Feet (Required for LE Total Joints) 80'   Comment, (Gait/Stairs) Patient ambulating 80' with SBA/CGA, FWW with right platform attachment.  During gait, patient demonstrating step-through gait patttern with slowed but steady gait.  Patient demonstrates appropriate upright posture during gait, intermittently needing cues for walker manangement during turns/pivots.    Balance   Balance Comments Recent fall on ice   Sensory Examination   Sensory Perception patient reports no sensory changes   Clinical Impression   Criteria for Skilled Therapeutic Intervention Yes, treatment indicated   PT Diagnosis (PT) Impaired functional mobility   Influenced by the following impairments Right upper extremity pain and limited ROM, thoracic back pain and limited ROM, decreased activity tolerance   Functional limitations due to impairments Impaired independence with  bed mobility, transfers, gait, and stair negotiation   Clinical Presentation (PT Evaluation Complexity) Evolving/Changing   Clinical Presentation Rationale Clinical judgement, PMH, social support   Clinical Decision Making (Complexity) moderate complexity   Planned Therapy Interventions (PT) balance training;bed mobility training;gait training;home exercise program;patient/family education;postural re-education;stair training;strengthening;transfer training   Anticipated Equipment Needs at Discharge (PT)   (Will continue to assess AD for discharge.)   Risk & Benefits of therapy have been explained evaluation/treatment results reviewed;care plan/treatment goals reviewed;risks/benefits reviewed;participants voiced agreement with care plan;participants included;patient;spouse/significant other   PT Discharge Planning   PT Discharge Recommendation (DC Rec) home with assist;home with outpatient physical therapy   PT Rationale for DC Rec Patient presents with acute right wrist fracture and T11 compression fracture.  Patient was able to perform bed mobility, transfers, and 80' gait with SBA/CGA and FWW with right platform attachment.  Patient reports increased back pain with transitional movements but tolerate ambulation well.  Anticipate with continued IP PT and medical management, patientn will be appropriate to discharge home with assistance from spouse for ADLs and mobility.  Patient may benefit from a FWW with platform attachment at discharge.  OP PT to address compression fracture.   Total Evaluation Time   Total Evaluation Time (Minutes) 10   Physical Therapy Goals   PT Frequency Daily   PT Predicated Duration/Target Date for Goal Attainment 02/19/22   PT Goals Bed Mobility;Transfers;Gait;Stairs   PT: Bed Mobility Minimal assist;Supine to/from sit;Rolling   PT: Transfers Supervision/stand-by assist;Sit to/from stand;Bed to/from chair;Assistive device   PT: Gait Supervision/stand-by assist;Rolling walker;100 feet    PT: Stairs 2 stairs;Minimal assist

## 2022-02-17 ENCOUNTER — ANESTHESIA EVENT (OUTPATIENT)
Dept: SURGERY | Facility: CLINIC | Age: 67
DRG: 501 | End: 2022-02-17
Payer: MEDICARE

## 2022-02-17 ENCOUNTER — APPOINTMENT (OUTPATIENT)
Dept: OCCUPATIONAL THERAPY | Facility: CLINIC | Age: 67
DRG: 501 | End: 2022-02-17
Payer: MEDICARE

## 2022-02-17 ENCOUNTER — APPOINTMENT (OUTPATIENT)
Dept: PHYSICAL THERAPY | Facility: CLINIC | Age: 67
DRG: 501 | End: 2022-02-17
Payer: MEDICARE

## 2022-02-17 PROCEDURE — 97535 SELF CARE MNGMENT TRAINING: CPT | Mod: GO

## 2022-02-17 PROCEDURE — 250N000013 HC RX MED GY IP 250 OP 250 PS 637: Performed by: PHYSICIAN ASSISTANT

## 2022-02-17 PROCEDURE — 250N000011 HC RX IP 250 OP 636: Performed by: INTERNAL MEDICINE

## 2022-02-17 PROCEDURE — 97116 GAIT TRAINING THERAPY: CPT | Mod: GP

## 2022-02-17 PROCEDURE — 120N000001 HC R&B MED SURG/OB

## 2022-02-17 PROCEDURE — 99232 SBSQ HOSP IP/OBS MODERATE 35: CPT | Performed by: INTERNAL MEDICINE

## 2022-02-17 PROCEDURE — 250N000013 HC RX MED GY IP 250 OP 250 PS 637: Performed by: INTERNAL MEDICINE

## 2022-02-17 PROCEDURE — 250N000013 HC RX MED GY IP 250 OP 250 PS 637

## 2022-02-17 RX ORDER — ACETAMINOPHEN 500 MG
1000 TABLET ORAL EVERY 6 HOURS PRN
Qty: 60 TABLET | Refills: 1 | Status: SHIPPED | OUTPATIENT
Start: 2022-02-17

## 2022-02-17 RX ORDER — POLYETHYLENE GLYCOL 3350 17 G/17G
17 POWDER, FOR SOLUTION ORAL DAILY
Qty: 510 G | Refills: 1 | Status: SHIPPED | OUTPATIENT
Start: 2022-02-17

## 2022-02-17 RX ORDER — BISACODYL 10 MG
10 SUPPOSITORY, RECTAL RECTAL DAILY PRN
Status: DISCONTINUED | OUTPATIENT
Start: 2022-02-17 | End: 2022-02-19 | Stop reason: HOSPADM

## 2022-02-17 RX ORDER — OXYCODONE HYDROCHLORIDE 5 MG/1
5-10 TABLET ORAL EVERY 6 HOURS PRN
Qty: 20 TABLET | Refills: 0 | Status: SHIPPED | OUTPATIENT
Start: 2022-02-17 | End: 2022-06-06

## 2022-02-17 RX ORDER — POLYETHYLENE GLYCOL 3350 17 G/17G
17 POWDER, FOR SOLUTION ORAL 2 TIMES DAILY
Status: DISCONTINUED | OUTPATIENT
Start: 2022-02-17 | End: 2022-02-19 | Stop reason: HOSPADM

## 2022-02-17 RX ORDER — BISACODYL 10 MG
10 SUPPOSITORY, RECTAL RECTAL DAILY PRN
Qty: 10 SUPPOSITORY | Refills: 0 | Status: SHIPPED | OUTPATIENT
Start: 2022-02-17 | End: 2023-04-10

## 2022-02-17 RX ORDER — AMOXICILLIN 250 MG
2 CAPSULE ORAL 2 TIMES DAILY
Qty: 60 TABLET | Refills: 1 | Status: SHIPPED | OUTPATIENT
Start: 2022-02-17

## 2022-02-17 RX ADMIN — Medication 600 MG: at 09:12

## 2022-02-17 RX ADMIN — Medication 50 MCG: at 09:12

## 2022-02-17 RX ADMIN — CEPHALEXIN 500 MG: 500 CAPSULE ORAL at 21:20

## 2022-02-17 RX ADMIN — OXYCODONE HYDROCHLORIDE 10 MG: 5 TABLET ORAL at 13:48

## 2022-02-17 RX ADMIN — CEPHALEXIN 500 MG: 500 CAPSULE ORAL at 09:12

## 2022-02-17 RX ADMIN — ACETAMINOPHEN 975 MG: 325 TABLET, FILM COATED ORAL at 23:43

## 2022-02-17 RX ADMIN — ONDANSETRON 4 MG: 4 TABLET, ORALLY DISINTEGRATING ORAL at 13:59

## 2022-02-17 RX ADMIN — ACETAMINOPHEN 975 MG: 325 TABLET, FILM COATED ORAL at 02:46

## 2022-02-17 RX ADMIN — BISACODYL 10 MG: 10 SUPPOSITORY RECTAL at 13:40

## 2022-02-17 RX ADMIN — OXYCODONE HYDROCHLORIDE 5 MG: 5 TABLET ORAL at 09:14

## 2022-02-17 RX ADMIN — ROSUVASTATIN CALCIUM 5 MG: 5 TABLET, FILM COATED ORAL at 21:20

## 2022-02-17 RX ADMIN — ZOLPIDEM TARTRATE 5 MG: 5 TABLET ORAL at 02:46

## 2022-02-17 RX ADMIN — OXYCODONE HYDROCHLORIDE 5 MG: 5 TABLET ORAL at 21:20

## 2022-02-17 RX ADMIN — SENNOSIDES AND DOCUSATE SODIUM 2 TABLET: 50; 8.6 TABLET ORAL at 09:12

## 2022-02-17 RX ADMIN — POLYETHYLENE GLYCOL 3350 17 G: 17 POWDER, FOR SOLUTION ORAL at 09:12

## 2022-02-17 RX ADMIN — ZOLPIDEM TARTRATE 5 MG: 5 TABLET ORAL at 23:35

## 2022-02-17 RX ADMIN — Medication 1 CAPSULE: at 09:12

## 2022-02-17 ASSESSMENT — ACTIVITIES OF DAILY LIVING (ADL)
ADLS_ACUITY_SCORE: 14
ADLS_ACUITY_SCORE: 12
ADLS_ACUITY_SCORE: 14
ADLS_ACUITY_SCORE: 14
ADLS_ACUITY_SCORE: 11
ADLS_ACUITY_SCORE: 11
ADLS_ACUITY_SCORE: 14
ADLS_ACUITY_SCORE: 14
ADLS_ACUITY_SCORE: 11
ADLS_ACUITY_SCORE: 12
ADLS_ACUITY_SCORE: 14
ADLS_ACUITY_SCORE: 11
ADLS_ACUITY_SCORE: 12
ADLS_ACUITY_SCORE: 14
ADLS_ACUITY_SCORE: 11
ADLS_ACUITY_SCORE: 14
ADLS_ACUITY_SCORE: 11

## 2022-02-17 NOTE — PROVIDER NOTIFICATION
"Dr. Dixon paged \"The patient is requesting a suppository for constipation; she has scheduled Miralax and Senna but has not had a bowel movement since she has been admitted. TY\"  "

## 2022-02-17 NOTE — PLAN OF CARE
Goal Outcome Evaluation:    Pt A/Ox4, VSS on RA, PRN Tyl & oxy given x1 for pain, pt voiding on toilet into hat, walks to bathroom using w/gb, no N/V on this shift, possible discharge today.

## 2022-02-17 NOTE — TREATMENT PLAN
Orthopedic Surgery    Assessment:   Right distal radius fracture  T11 compression fracture     Plan:  Patient going to OR tomorrow for ORIF of the right distal radius   Surgeon: Dr Flores  NPO effective midnight  Keep splint applied  WBAT, no bracing for T11 fracture.    Grisel Bacon PA-C on 2/17/2022 at 4:49 PM

## 2022-02-17 NOTE — PROGRESS NOTES
[facility]    Internal Medicine Hospitalist Progress Note  02/17/2022  I evaluated patient on the above date.    Hemant Dixon Jr., MD  [phone] (p)  Text Page  Vocera        Assessment & Plan New actions/orders today (02/17/2022) are underlined.    This is a 66 year old adult with pmh including HLD, OA and osteoporosis, with recent COVID-19 (1 month PTA), who presented 2/15/2022 with a mechanical fall and right wrist and back pain and found with a right wrist fracture and a T11 compression fracture.     Right wrist fracture (radius and ulna) due to fall.  Acute-subacute T11 compression fracture due to fall.  Acute-subacute  sacral fracture due to fall.  * R wrist x-rays 2/15 showed transverse fracture of the right radius distal metaphysis with moderate dorsal displacement, mild impaction, and mild volar apex angulation; oblique mildly displaced fracture of the ulnar styloid base with mild ulna positive variance. CT lumbar spine 2/15 showed moderate anterior wedge compression fracture deformity of the T11 vertebral body noted and appeared recent (acute-subacute); linear lucency through the sacrum at the S3-S4 junction noted and also likely represented an acute or subacute nondisplaced fracture. Right wrist splinted in the ED with follow-up x-rays 2/15 showing fractures in near anatomic position and alignment.   * Ortho evaluated 2/16; non-operative management recommended; brace not recommended at the time of consult.  - Continue PRN acetaminophen, PRN oxycodone, PRN IV hydromorphone; minimize opioids as able.  - Discontinue PRN tramadol as already on oxycodone.  - Continue right wrist splint; elevate when at rest; max lift, coffee cup.  - WBAT bilateral LEs with walker.  - Continue PT, OT.  - Follow-up with Dr Rojas regarding right wrist (may need surgery) and Dr Perez regarding compression fracture in 1 week ([facility] number: [phone]).    Leukocytosis, suspect reactive.  * WBC 11.7  "on admit 2/15.  * WBC normalized 2/16.     Urinary retention, suspect due to meds, decreased mobility.  * Issues with urinary retention 2/16 which subsequently improved  * Voiding OK 2/17.  - Continue bladder scans and PRN SIC.    Constipation due to opioids, decreased mobility.  * Pt c/o constipation 2/17.  - Order bisacodyl suppository.  - Continue Miralax daily, senna-docusate BID.    UTI, present on admission.  * PTA on cephalexin.  - Continue cephalexin.     HLD.  - Continue rosuvastatin.    Clinically Significant Risk Factors Present on Admission                   COVID-19 testing.  COVID-19 PCR Results    COVID-19 PCR Results 3/10/21 1/6/22 2/15/22   COVID-19 Virus by PCR (External Result) Negative Positive (A)    SARS CoV2 PCR   Positive (A)   (A) Abnormal value       Comments are available for some flowsheets but are not being displayed.         COVID-19 Antibody Results, Testing for Immunity    COVID-19 Antibody Results, Testing for Immunity   No data to display.             Diet: Combination Diet Regular Diet Adult    Prophylaxis: PCD's, ambulation.   Zendejas Catheter: Not present  Central Lines: None  Code Status: Full Code    Disposition Plan   Expected discharge: today recommended to prior living arrangement.  Entered: Hemant Dixon MD 02/17/2022, 11:28 AM         Interval History   Doing better today.  Worked with therapy, moving better.  Voiding OK.  C/o constipation.    -Data reviewed today: I reviewed all new labs and imaging over the last 24 hours. I personally reviewed no images or EKG's today.    Physical Exam    , Blood pressure 134/79, pulse 76, temperature 98.1  F (36.7  C), temperature source Oral, resp. rate 17, height 1.778 m (5' 10\"), weight 61.2 kg (135 lb), SpO2 96 %.  Vitals:    02/15/22 1030   Weight: 61.2 kg (135 lb)     Vital Signs with Ranges  Temp:  [98.1  F (36.7  C)-98.9  F (37.2  C)] 98.1  F (36.7  C)  Pulse:  [71-87] 76  Resp:  [16-17] 17  BP: (131-134)/(74-79) " 134/79  SpO2:  [94 %-96 %] 96 %  Patient Vitals for the past 24 hrs:   BP Temp Temp src Pulse Resp SpO2   02/17/22 0723 134/79 98.1  F (36.7  C) Oral 76 17 96 %   02/17/22 0326 -- -- -- -- 16 --   02/17/22 0100 133/75 98.9  F (37.2  C) Oral 71 16 96 %   02/16/22 2048 -- -- -- -- 16 --   02/16/22 1606 131/74 98.6  F (37  C) Oral 87 16 94 %     I/O's Last 24 hours  I/O last 3 completed shifts:  In: 360 [P.O.:360]  Out: 3200 [Urine:3200]    Constitutional: Awake, alert, pleasant.  Respiratory: Diminished in bases. No crackles or wheezes.  Cardiovascular: RRR, no m/r/g.  GI: Soft, nt, nd, +BS.  Skin/Integumen: Right upper extremity in splint.  Other:        Data   Recent Labs   Lab 02/16/22 1230 02/16/22 0642 02/15/22  2120 02/15/22  1249   WBC 6.9 6.8  --  11.7*   HGB 13.2 11.5*  --  13.6   MCV 93 92  --  93    252  --  235   INR  --   --   --  1.08     --   --  134   POTASSIUM 4.0  --   --  4.2   CHLORIDE 102  --   --  105   CO2 26  --   --  22   BUN 8  --   --  12   CR 0.52  --   --  0.49*   ANIONGAP 5  --   --  7   HANDY 8.5  --   --  8.6   *  --  115* 102*     Recent Labs   Lab Test 02/16/22  1230 02/15/22  2120 02/15/22  1249 09/14/16  1836   * 115* 102* 139*     Recent Labs   Lab 02/16/22  1230 02/16/22  0642 02/15/22  1249   WBC 6.9 6.8 11.7*         No results found for this or any previous visit (from the past 24 hour(s)).    Medications   All medications were reviewed.      calcium carbonate  600 mg Oral Daily     cephALEXin  500 mg Oral BID     multivitamin  with lutein  1 capsule Oral Daily     polyethylene glycol  17 g Oral Daily     rosuvastatin  5 mg Oral QPM     senna-docusate  1 tablet Oral BID    Or     senna-docusate  2 tablet Oral BID     sodium chloride (PF)  3 mL Intracatheter Q8H     vitamin D3  50 mcg Oral Daily     [DISCONTINUED] acetaminophen **OR** acetaminophen, acetaminophen, HYDROmorphone, lidocaine 4%, lidocaine (buffered or not buffered), magnesium hydroxide,  melatonin, naloxone **OR** naloxone **OR** naloxone **OR** naloxone, ondansetron **OR** ondansetron, oxyCODONE, sodium chloride (PF), traMADol, zolpidem

## 2022-02-17 NOTE — PROGRESS NOTES
Pt I AOx4, up SBA in room. Continent of urine PVR discontiunued patient voiding spontaneously, suppository given PRN after scheduled senna and miralax were not productive. PRN oxy given to control pain, mostly was around 5-6 but was up to an 8 post shower and PT/OT so 10 mg given. Pt will be having ORIF surgery on wrist tomorrow here at [facility] at 1400, will need to be NPO at 12am. Bed hold in place in case patient needs to be monitored after PACU.

## 2022-02-17 NOTE — PROGRESS NOTES
Care Management Initial Consult    General Information  Assessment completed with:  ,         Primary Care Provider verified and updated as needed:     Readmission within the last 30 days:           Advance Care Planning:            Communication Assessment  Patient's communication style: spoken language (English or Bilingual)    Hearing Difficulty or Deaf: yes   Wear Glasses or Blind: yes    Cognitive  Cognitive/Neuro/Behavioral: WDL                      Living Environment:   People in home:       Current living Arrangements:        Able to return to prior arrangements:         Family/Social Support:  Care provided by:    Provides care for:                  Description of Support System:           Current Resources:   Patient receiving home care services:       Community Resources:    Equipment currently used at home: walker, standard  Supplies currently used at home:      Employment/Financial:  Employment Status:          Financial Concerns:             Lifestyle & Psychosocial Needs:  Social Determinants of Health     Tobacco Use: Low Risk      Smoking Tobacco Use: Never Smoker     Smokeless Tobacco Use: Never Used   Alcohol Use: Not on file   Financial Resource Strain: Not on file   Food Insecurity: Not on file   Transportation Needs: Not on file   Physical Activity: Not on file   Stress: Not on file   Social Connections: Not on file   Intimate Partner Violence: Not on file   Depression: Not on file   Housing Stability: Not on file       Functional Status:  Prior to admission patient needed assistance:              Mental Health Status:          Chemical Dependency Status:                Values/Beliefs:  Spiritual, Cultural Beliefs, Denominational Practices, Values that affect care:                 Additional Information:      Care Management Discharge Note    Discharge Date: 02/17/2022       Discharge Disposition: Home Care    Discharge Services: Other (see comment)    Discharge DME: Walker    Discharge  Transportation: family or friend will provide, car, drives self    Private pay costs discussed: Not applicable    PAS Confirmation Code:    Patient/family educated on Medicare website which has current facility and service quality ratings:      Education Provided on the Discharge Plan:    Persons Notified of Discharge Plans: Patient  Patient/Family in Agreement with the Plan: yes    Handoff Referral Completed: No    Additional Information:  Patient discussed at  [facility] this morning and plan was to go home with OP PT.  Writer received call from Janice at [facility] at about 2 PM that there are home RN and PT orders in chart.  Writer paged to Dr. Dixon to verify what is needed.  Home PT per Dr. Dixon.  Orders updated.  Writer waited for call back from [facility] to see if they could take patient for PT.  Did not receive call back by 4 PM.  Writer called [facility] and they can take patient and orders, FS and H & P were faxed to [phone].    Writer placed call to pt we discussed the home care PT with [facility] and writer will add the contact info to AVS.  Patient's discharge plan has changed and she is going to have surgery tomorrow so will not be discharging today.        Maira Bentley RN

## 2022-02-17 NOTE — PROVIDER NOTIFICATION
"Dr. Destiny amador paged \"The patient is concerned about constipation. She has taken Miralax, Senna, and suppository, to no avail. She is refusing M.O.M. \"  "

## 2022-02-18 ENCOUNTER — APPOINTMENT (OUTPATIENT)
Dept: GENERAL RADIOLOGY | Facility: CLINIC | Age: 67
DRG: 501 | End: 2022-02-18
Attending: INTERNAL MEDICINE
Payer: MEDICARE

## 2022-02-18 ENCOUNTER — ANESTHESIA (OUTPATIENT)
Dept: SURGERY | Facility: CLINIC | Age: 67
DRG: 501 | End: 2022-02-18
Payer: MEDICARE

## 2022-02-18 ENCOUNTER — APPOINTMENT (OUTPATIENT)
Dept: PHYSICAL THERAPY | Facility: CLINIC | Age: 67
DRG: 501 | End: 2022-02-18
Payer: MEDICARE

## 2022-02-18 PROCEDURE — 250N000009 HC RX 250: Performed by: NURSE ANESTHETIST, CERTIFIED REGISTERED

## 2022-02-18 PROCEDURE — 999N000141 HC STATISTIC PRE-PROCEDURE NURSING ASSESSMENT: Performed by: ORTHOPAEDIC SURGERY

## 2022-02-18 PROCEDURE — 250N000013 HC RX MED GY IP 250 OP 250 PS 637: Performed by: PHYSICIAN ASSISTANT

## 2022-02-18 PROCEDURE — 710N000009 HC RECOVERY PHASE 1, LEVEL 1, PER MIN: Performed by: ORTHOPAEDIC SURGERY

## 2022-02-18 PROCEDURE — 250N000011 HC RX IP 250 OP 636: Performed by: NURSE ANESTHETIST, CERTIFIED REGISTERED

## 2022-02-18 PROCEDURE — 99232 SBSQ HOSP IP/OBS MODERATE 35: CPT | Performed by: INTERNAL MEDICINE

## 2022-02-18 PROCEDURE — 250N000009 HC RX 250: Performed by: ANESTHESIOLOGY

## 2022-02-18 PROCEDURE — 250N000011 HC RX IP 250 OP 636: Performed by: PHYSICIAN ASSISTANT

## 2022-02-18 PROCEDURE — 272N000001 HC OR GENERAL SUPPLY STERILE: Performed by: ORTHOPAEDIC SURGERY

## 2022-02-18 PROCEDURE — 250N000013 HC RX MED GY IP 250 OP 250 PS 637: Performed by: INTERNAL MEDICINE

## 2022-02-18 PROCEDURE — C1713 ANCHOR/SCREW BN/BN,TIS/BN: HCPCS | Performed by: ORTHOPAEDIC SURGERY

## 2022-02-18 PROCEDURE — 250N000013 HC RX MED GY IP 250 OP 250 PS 637

## 2022-02-18 PROCEDURE — 120N000001 HC R&B MED SURG/OB

## 2022-02-18 PROCEDURE — 360N000076 HC SURGERY LEVEL 3, PER MIN: Performed by: ORTHOPAEDIC SURGERY

## 2022-02-18 PROCEDURE — 97530 THERAPEUTIC ACTIVITIES: CPT | Mod: GP

## 2022-02-18 PROCEDURE — 258N000003 HC RX IP 258 OP 636: Performed by: SURGERY

## 2022-02-18 PROCEDURE — 999N000179 XR SURGERY CARM FLUORO LESS THAN 5 MIN W STILLS

## 2022-02-18 PROCEDURE — 250N000011 HC RX IP 250 OP 636: Performed by: ANESTHESIOLOGY

## 2022-02-18 PROCEDURE — 250N000009 HC RX 250: Performed by: ORTHOPAEDIC SURGERY

## 2022-02-18 PROCEDURE — 370N000017 HC ANESTHESIA TECHNICAL FEE, PER MIN: Performed by: ORTHOPAEDIC SURGERY

## 2022-02-18 DEVICE — IMP SCR SYN CORTEX T8 STARDRIVE 2.7X14MM SELF TAP 202.874: Type: IMPLANTABLE DEVICE | Site: WRIST | Status: FUNCTIONAL

## 2022-02-18 DEVICE — IMP PLATE SYN LCP VOLAR DIST RADIUS 6H 54MM RT 02.111.630: Type: IMPLANTABLE DEVICE | Site: WRIST | Status: FUNCTIONAL

## 2022-02-18 DEVICE — IMP SCR SYN CORTEX T8 STARDRIVE 2.7X12MM SELF TAP 202.872: Type: IMPLANTABLE DEVICE | Site: WRIST | Status: FUNCTIONAL

## 2022-02-18 DEVICE — IMP SCR SYN CAN T8 STARDRIVE 2.4X20MM VA-LCP ST 02.210.120: Type: IMPLANTABLE DEVICE | Site: WRIST | Status: FUNCTIONAL

## 2022-02-18 DEVICE — IMP SCR SYN LCP CORTEX 2.4X22MM SELF TAP 201.772: Type: IMPLANTABLE DEVICE | Site: WRIST | Status: FUNCTIONAL

## 2022-02-18 RX ORDER — FLUMAZENIL 0.1 MG/ML
0.2 INJECTION, SOLUTION INTRAVENOUS
Status: DISCONTINUED | OUTPATIENT
Start: 2022-02-18 | End: 2022-02-18 | Stop reason: HOSPADM

## 2022-02-18 RX ORDER — NALOXONE HYDROCHLORIDE 0.4 MG/ML
0.2 INJECTION, SOLUTION INTRAMUSCULAR; INTRAVENOUS; SUBCUTANEOUS
Status: DISCONTINUED | OUTPATIENT
Start: 2022-02-18 | End: 2022-02-18

## 2022-02-18 RX ORDER — SODIUM CHLORIDE, SODIUM LACTATE, POTASSIUM CHLORIDE, CALCIUM CHLORIDE 600; 310; 30; 20 MG/100ML; MG/100ML; MG/100ML; MG/100ML
INJECTION, SOLUTION INTRAVENOUS CONTINUOUS
Status: DISCONTINUED | OUTPATIENT
Start: 2022-02-18 | End: 2022-02-18 | Stop reason: HOSPADM

## 2022-02-18 RX ORDER — LIDOCAINE HYDROCHLORIDE 20 MG/ML
INJECTION, SOLUTION INFILTRATION; PERINEURAL PRN
Status: DISCONTINUED | OUTPATIENT
Start: 2022-02-18 | End: 2022-02-18

## 2022-02-18 RX ORDER — NALOXONE HYDROCHLORIDE 0.4 MG/ML
0.4 INJECTION, SOLUTION INTRAMUSCULAR; INTRAVENOUS; SUBCUTANEOUS
Status: DISCONTINUED | OUTPATIENT
Start: 2022-02-18 | End: 2022-02-18

## 2022-02-18 RX ORDER — CEFAZOLIN SODIUM/WATER 2 G/20 ML
2 SYRINGE (ML) INTRAVENOUS SEE ADMIN INSTRUCTIONS
Status: DISCONTINUED | OUTPATIENT
Start: 2022-02-18 | End: 2022-02-18 | Stop reason: HOSPADM

## 2022-02-18 RX ORDER — PROPOFOL 10 MG/ML
INJECTION, EMULSION INTRAVENOUS CONTINUOUS PRN
Status: DISCONTINUED | OUTPATIENT
Start: 2022-02-18 | End: 2022-02-18

## 2022-02-18 RX ORDER — SODIUM CHLORIDE, SODIUM LACTATE, POTASSIUM CHLORIDE, CALCIUM CHLORIDE 600; 310; 30; 20 MG/100ML; MG/100ML; MG/100ML; MG/100ML
INJECTION, SOLUTION INTRAVENOUS CONTINUOUS
Status: DISCONTINUED | OUTPATIENT
Start: 2022-02-18 | End: 2022-02-19 | Stop reason: HOSPADM

## 2022-02-18 RX ORDER — ONDANSETRON 2 MG/ML
INJECTION INTRAMUSCULAR; INTRAVENOUS PRN
Status: DISCONTINUED | OUTPATIENT
Start: 2022-02-18 | End: 2022-02-18

## 2022-02-18 RX ORDER — ONDANSETRON 4 MG/1
4 TABLET, ORALLY DISINTEGRATING ORAL EVERY 30 MIN PRN
Status: DISCONTINUED | OUTPATIENT
Start: 2022-02-18 | End: 2022-02-18 | Stop reason: HOSPADM

## 2022-02-18 RX ORDER — FENTANYL CITRATE 50 UG/ML
50 INJECTION, SOLUTION INTRAMUSCULAR; INTRAVENOUS EVERY 5 MIN PRN
Status: DISCONTINUED | OUTPATIENT
Start: 2022-02-18 | End: 2022-02-18 | Stop reason: HOSPADM

## 2022-02-18 RX ORDER — CEFAZOLIN SODIUM 1 G/3ML
1 INJECTION, POWDER, FOR SOLUTION INTRAMUSCULAR; INTRAVENOUS EVERY 8 HOURS
Status: COMPLETED | OUTPATIENT
Start: 2022-02-18 | End: 2022-02-19

## 2022-02-18 RX ORDER — BUPIVACAINE HYDROCHLORIDE AND EPINEPHRINE 5; 5 MG/ML; UG/ML
INJECTION, SOLUTION EPIDURAL; INTRACAUDAL; PERINEURAL PRN
Status: DISCONTINUED | OUTPATIENT
Start: 2022-02-18 | End: 2022-02-18 | Stop reason: HOSPADM

## 2022-02-18 RX ORDER — FENTANYL CITRATE 50 UG/ML
50 INJECTION, SOLUTION INTRAMUSCULAR; INTRAVENOUS
Status: DISCONTINUED | OUTPATIENT
Start: 2022-02-18 | End: 2022-02-18 | Stop reason: HOSPADM

## 2022-02-18 RX ORDER — FENTANYL CITRATE 50 UG/ML
INJECTION, SOLUTION INTRAMUSCULAR; INTRAVENOUS PRN
Status: DISCONTINUED | OUTPATIENT
Start: 2022-02-18 | End: 2022-02-18

## 2022-02-18 RX ORDER — MAGNESIUM HYDROXIDE 1200 MG/15ML
LIQUID ORAL PRN
Status: DISCONTINUED | OUTPATIENT
Start: 2022-02-18 | End: 2022-02-18 | Stop reason: HOSPADM

## 2022-02-18 RX ORDER — LIDOCAINE 40 MG/G
CREAM TOPICAL
Status: DISCONTINUED | OUTPATIENT
Start: 2022-02-18 | End: 2022-02-19 | Stop reason: HOSPADM

## 2022-02-18 RX ORDER — HYDROMORPHONE HCL IN WATER/PF 6 MG/30 ML
0.4 PATIENT CONTROLLED ANALGESIA SYRINGE INTRAVENOUS EVERY 5 MIN PRN
Status: DISCONTINUED | OUTPATIENT
Start: 2022-02-18 | End: 2022-02-18 | Stop reason: HOSPADM

## 2022-02-18 RX ORDER — ONDANSETRON 2 MG/ML
4 INJECTION INTRAMUSCULAR; INTRAVENOUS EVERY 30 MIN PRN
Status: DISCONTINUED | OUTPATIENT
Start: 2022-02-18 | End: 2022-02-18 | Stop reason: HOSPADM

## 2022-02-18 RX ORDER — CEFAZOLIN SODIUM/WATER 2 G/20 ML
2 SYRINGE (ML) INTRAVENOUS
Status: COMPLETED | OUTPATIENT
Start: 2022-02-18 | End: 2022-02-18

## 2022-02-18 RX ADMIN — Medication 2 G: at 13:16

## 2022-02-18 RX ADMIN — ACETAMINOPHEN 975 MG: 325 TABLET, FILM COATED ORAL at 08:46

## 2022-02-18 RX ADMIN — POLYETHYLENE GLYCOL 3350 17 G: 17 POWDER, FOR SOLUTION ORAL at 20:14

## 2022-02-18 RX ADMIN — Medication 30 ML: at 13:08

## 2022-02-18 RX ADMIN — ROSUVASTATIN CALCIUM 5 MG: 5 TABLET, FILM COATED ORAL at 20:13

## 2022-02-18 RX ADMIN — FENTANYL CITRATE 50 MCG: 50 INJECTION, SOLUTION INTRAMUSCULAR; INTRAVENOUS at 13:26

## 2022-02-18 RX ADMIN — ASPIRIN 325 MG: 325 TABLET, COATED ORAL at 20:13

## 2022-02-18 RX ADMIN — CEFAZOLIN 1 G: 1 INJECTION, POWDER, FOR SOLUTION INTRAMUSCULAR; INTRAVENOUS at 20:13

## 2022-02-18 RX ADMIN — SODIUM CHLORIDE, POTASSIUM CHLORIDE, SODIUM LACTATE AND CALCIUM CHLORIDE: 600; 310; 30; 20 INJECTION, SOLUTION INTRAVENOUS at 12:40

## 2022-02-18 RX ADMIN — ONDANSETRON 4 MG: 2 INJECTION INTRAMUSCULAR; INTRAVENOUS at 13:26

## 2022-02-18 RX ADMIN — OXYCODONE HYDROCHLORIDE 10 MG: 5 TABLET ORAL at 17:54

## 2022-02-18 RX ADMIN — PROPOFOL 100 MCG/KG/MIN: 10 INJECTION, EMULSION INTRAVENOUS at 13:26

## 2022-02-18 RX ADMIN — ZOLPIDEM TARTRATE 5 MG: 5 TABLET ORAL at 23:01

## 2022-02-18 RX ADMIN — LIDOCAINE HYDROCHLORIDE 20 MG: 20 INJECTION, SOLUTION INFILTRATION; PERINEURAL at 13:26

## 2022-02-18 RX ADMIN — MIDAZOLAM 1 MG: 1 INJECTION INTRAMUSCULAR; INTRAVENOUS at 13:26

## 2022-02-18 RX ADMIN — MIDAZOLAM 2 MG: 1 INJECTION INTRAMUSCULAR; INTRAVENOUS at 13:05

## 2022-02-18 RX ADMIN — ACETAMINOPHEN 975 MG: 325 TABLET, FILM COATED ORAL at 20:13

## 2022-02-18 RX ADMIN — OXYCODONE HYDROCHLORIDE 5 MG: 5 TABLET ORAL at 08:46

## 2022-02-18 RX ADMIN — CEPHALEXIN 500 MG: 500 CAPSULE ORAL at 08:41

## 2022-02-18 ASSESSMENT — ENCOUNTER SYMPTOMS
SEIZURES: 0
DYSRHYTHMIAS: 0

## 2022-02-18 ASSESSMENT — ACTIVITIES OF DAILY LIVING (ADL)
ADLS_ACUITY_SCORE: 13
ADLS_ACUITY_SCORE: 14
ADLS_ACUITY_SCORE: 13
ADLS_ACUITY_SCORE: 13
ADLS_ACUITY_SCORE: 14
ADLS_ACUITY_SCORE: 13
ADLS_ACUITY_SCORE: 14
ADLS_ACUITY_SCORE: 13
ADLS_ACUITY_SCORE: 14
ADLS_ACUITY_SCORE: 13
ADLS_ACUITY_SCORE: 14
ADLS_ACUITY_SCORE: 13
ADLS_ACUITY_SCORE: 14
ADLS_ACUITY_SCORE: 14

## 2022-02-18 ASSESSMENT — LIFESTYLE VARIABLES: TOBACCO_USE: 0

## 2022-02-18 NOTE — ANESTHESIA CARE TRANSFER NOTE
Patient: Justus Lozano    Procedure: Procedure(s):  OPEN REDUCTION INTERNAL FIXATION, OF DISTAL RADIUS FRACTURE       Diagnosis: Distal radius fracture [S52.509A]  Diagnosis Additional Information: No value filed.    Anesthesia Type:   Peripheral Nerve Block     Note:    Oropharynx: oropharynx clear of all foreign objects  Level of Consciousness: awake  Oxygen Supplementation: face mask    Independent Airway: airway patency satisfactory and stable  Dentition: dentition unchanged  Vital Signs Stable: post-procedure vital signs reviewed and stable  Report to RN Given: handoff report given  Patient transferred to: PACU    Handoff Report: Identifed the Patient, Identified the Reponsible Provider, Reviewed the pertinent medical history, Discussed the surgical course, Reviewed Intra-OP anesthesia mangement and issues during anesthesia, Set expectations for post-procedure period and Allowed opportunity for questions and acknowledgement of understanding      Vitals:  Vitals Value Taken Time   /87 02/18/22 1440   Temp 36.7  C (98  F) 02/18/22 1440   Pulse 92 02/18/22 1442   Resp 17 02/18/22 1442   SpO2 99 % 02/18/22 1442   Vitals shown include unvalidated device data.    Electronically Signed By: DAMIR De Leon CRNA  February 18, 2022  2:44 PM

## 2022-02-18 NOTE — PROGRESS NOTES
[facility]    Internal Medicine Hospitalist Progress Note  02/18/2022  I evaluated patient on the above date.    Hemant Dixon Jr., MD  [phone] (p)  Text Page  Vocera        Assessment & Plan New actions/orders today (02/18/2022) are underlined.    This is a 66 year old adult with pmh including HLD, OA and osteoporosis, with recent COVID-19 (1 month PTA), who presented 2/15/2022 with a mechanical fall and right wrist and back pain and found with a right wrist fracture and a T11 compression fracture.     Right wrist fracture (radius and ulna) due to fall.  Acute-subacute T11 compression fracture due to fall.  Acute-subacute  sacral fracture due to fall.  * R wrist x-rays 2/15 showed transverse fracture of the right radius distal metaphysis with moderate dorsal displacement, mild impaction, and mild volar apex angulation; oblique mildly displaced fracture of the ulnar styloid base with mild ulna positive variance. CT lumbar spine 2/15 showed moderate anterior wedge compression fracture deformity of the T11 vertebral body noted and appeared recent (acute-subacute); linear lucency through the sacrum at the S3-S4 junction noted and also likely represented an acute or subacute nondisplaced fracture. Right wrist splinted in the ED with follow-up x-rays 2/15 showing fractures in near anatomic position and alignment.   * Ortho evaluated 2/16; non-operative management recommended; brace not recommended at the time of consult.  * Ortho re-evaluated 2/17 and recommended surgery for right wrist fracture.  - Plan ORIF distal radius fracture today 2/18.  - Continue PRN acetaminophen, PRN oxycodone, PRN IV hydromorphone; minimize opioids as able.  - Continue right wrist splint; elevate when at rest; max lift, coffee cup.  - WBAT bilateral LEs with walker.  - Continue PT, OT.  - Follow-up with Dr Rojas regarding right wrist and Dr Perez regarding compression fracture in 1 week ([facility] number:  [phone]).    Leukocytosis, suspect reactive.  * WBC 11.7 on admit 2/15.  * WBC normalized 2/16.     Urinary retention, suspect due to meds, decreased mobility.  * Issues with urinary retention 2/16 which subsequently improved  * Voiding OK 2/17.  - Continue bladder scans and PRN SIC.    Constipation due to opioids, decreased mobility.  * 2/17: Pt c/o constipation. Bowel regimen intensified. Finally, later in the day had small BM but large release of flatus.  - Continue Miralax BID, senna-docusate BID, PRN bisacodyl suppository, PRN enema.  - Continue to increase activity.  - Minimize opioids as able.    UTI, present on admission.  * PTA on cephalexin.  - Continue cephalexin (started PTA 2/14) - stop after 2/20.     HLD.  - Continue rosuvastatin.    Clinically Significant Risk Factors Present on Admission                   COVID-19 testing.  COVID-19 PCR Results    COVID-19 PCR Results 3/10/21 1/6/22 2/15/22   COVID-19 Virus by PCR (External Result) Negative Positive (A)    SARS CoV2 PCR   Positive (A)   (A) Abnormal value       Comments are available for some flowsheets but are not being displayed.         COVID-19 Antibody Results, Testing for Immunity    COVID-19 Antibody Results, Testing for Immunity   No data to display.             Diet: Diet  NPO per Anesthesia Guidelines for Procedure/Surgery Except for: Meds, Ice Chips    Prophylaxis: PCD's, ambulation.   Zendejas Catheter: Not present  Central Lines: None  Code Status: Full Code    Disposition Plan   Expected discharge: possibly today after surgery recommended to prior living arrangement once OK with Ortho.  Entered: Hemant Dixon MD 02/18/2022, 10:03 AM         Interval History   Had small BM with large release of flatus yesterday and felt much better.  Doing OK today; awaiting surgery.    -Data reviewed today: I reviewed all new labs and imaging over the last 24 hours. I personally reviewed no images or EKG's today.    Physical Exam    , Blood  "pressure (!) 141/84, pulse 86, temperature 98  F (36.7  C), temperature source Oral, resp. rate 16, height 1.778 m (5' 10\"), weight 61.2 kg (135 lb), SpO2 97 %.  Vitals:    02/15/22 1030   Weight: 61.2 kg (135 lb)     Vital Signs with Ranges  Temp:  [98  F (36.7  C)-99.2  F (37.3  C)] 98  F (36.7  C)  Pulse:  [74-94] 86  Resp:  [14-18] 16  BP: (141-154)/(77-84) 141/84  SpO2:  [95 %-97 %] 97 %  Patient Vitals for the past 24 hrs:   BP Temp Temp src Pulse Resp SpO2   02/18/22 0724 (!) 141/84 98  F (36.7  C) Oral 86 16 97 %   02/18/22 0028 -- -- -- -- 14 --   02/17/22 2343 -- -- -- -- 16 --   02/17/22 2335 -- 98.2  F (36.8  C) Axillary -- 16 95 %   02/17/22 2205 -- -- -- -- 16 --   02/17/22 2120 -- -- -- -- 18 --   02/17/22 2100 (!) 147/83 98.9  F (37.2  C) Oral 94 18 96 %   02/17/22 1942 (!) 149/83 99.2  F (37.3  C) Oral 88 17 97 %   02/17/22 1624 (!) 154/77 98.6  F (37  C) Oral 74 17 97 %     I/O's Last 24 hours  I/O last 3 completed shifts:  In: 1500 [P.O.:1500]  Out: 400 [Urine:400]    Constitutional: Awake, alert, pleasant, conversant.  Respiratory: Diminished in bases. No crackles or wheezes.  Cardiovascular: RRR, no m/r/g.  GI: Soft, nt, nd, +BS.  Skin/Integumen: Right upper extremity in splint.  Other:        Data   Recent Labs   Lab 02/16/22  1230 02/16/22  0642 02/15/22  2120 02/15/22  1249   WBC 6.9 6.8  --  11.7*   HGB 13.2 11.5*  --  13.6   MCV 93 92  --  93    252  --  235   INR  --   --   --  1.08     --   --  134   POTASSIUM 4.0  --   --  4.2   CHLORIDE 102  --   --  105   CO2 26  --   --  22   BUN 8  --   --  12   CR 0.52  --   --  0.49*   ANIONGAP 5  --   --  7   HANDY 8.5  --   --  8.6   *  --  115* 102*     Recent Labs   Lab Test 02/16/22 1230 02/15/22  2120 02/15/22  1249 09/14/16  1836   * 115* 102* 139*     Recent Labs   Lab 02/16/22 1230 02/16/22  0642 02/15/22  1249   WBC 6.9 6.8 11.7*         No results found for this or any previous visit (from the past 24 " hour(s)).    Medications   All medications were reviewed.      calcium carbonate  600 mg Oral Daily     multivitamin  with lutein  1 capsule Oral Daily     polyethylene glycol  17 g Oral BID     rosuvastatin  5 mg Oral QPM     senna-docusate  1 tablet Oral BID    Or     senna-docusate  2 tablet Oral BID     sodium chloride (PF)  3 mL Intracatheter Q8H     vitamin D3  50 mcg Oral Daily     [DISCONTINUED] acetaminophen **OR** acetaminophen, acetaminophen, bisacodyl, HYDROmorphone, lidocaine 4%, lidocaine (buffered or not buffered), magnesium hydroxide, melatonin, naloxone **OR** naloxone **OR** naloxone **OR** naloxone, ondansetron **OR** ondansetron, oxyCODONE, sodium chloride (PF), sodium phosphate, zolpidem

## 2022-02-18 NOTE — ANESTHESIA POSTPROCEDURE EVALUATION
Patient: Justus Lozano    Procedure: Procedure(s):  OPEN REDUCTION INTERNAL FIXATION, OF DISTAL RADIUS FRACTURE       Diagnosis:Distal radius fracture [S52.509A]  Diagnosis Additional Information: No value filed.    Anesthesia Type:  Peripheral Nerve Block    Note:  Disposition: Outpatient   Postop Pain Control: Uneventful            Sign Out: Well controlled pain   PONV: No   Neuro/Psych: Uneventful            Sign Out: Acceptable/Baseline neuro status   Airway/Respiratory: Uneventful            Sign Out: Acceptable/Baseline resp. status   CV/Hemodynamics: Uneventful            Sign Out: Acceptable CV status; No obvious hypovolemia; No obvious fluid overload   Other NRE: NONE   DID A NON-ROUTINE EVENT OCCUR? No           Last vitals:  Vitals Value Taken Time   /73 02/18/22 1450   Temp 36.7  C (98  F) 02/18/22 1440   Pulse 76 02/18/22 1452   Resp 26 02/18/22 1452   SpO2 100 % 02/18/22 1452   Vitals shown include unvalidated device data.    Electronically Signed By: Sundar Oakes MD  February 18, 2022  2:53 PM

## 2022-02-18 NOTE — PLAN OF CARE
Goal Outcome Evaluation:    Plan of Care Reviewed With: patient     Patient vital signs are at baseline: Yes  Patient able to ambulate as they were prior to admission or with assist devices provided by therapies during their stay:  Yes  Patient MUST void prior to discharge:  Yes  Patient able to tolerate oral intake:  Yes, but pt NPO since 12 am for procedure at 1400 hrs this afternoon  Pain has adequate pain control using Oral analgesics:  Yes    AOx4, VSS on RA, Tylenol and Oxycodone for pain - somewhat effective. SBA w/ walker for ambulation to BR to void, smear of BM x2. Passing gas. Ambien at HS. Slept most of night.

## 2022-02-18 NOTE — DISCHARGE SUMMARY
[facility]  Discharge Summary        Justus Lozano MRN# [id number]   YOB: 1955 Age: 66 year old     Date of Admission: 2/15/2022  Date of Discharge: 2/19/2022  Admitting Physician: No admitting provider for patient encounter.  Discharge Physician: Hemant Dixon MD     Primary Provider: Abisai Ray  Primary Care Physician Phone Number: [phone]         Discharge Diagnoses:   1. Right wrist fracture (radius and ulna) due to fall - s/p ORIF right distal radius fracture 2/18/2022.  2. Acute-subacute T11 compression fracture due to fall.  3. Acute-subacute  sacral fracture due to fall.  4. Leukocytosis, suspect reactive.  5. Urinary retention, suspect due to meds, decreased mobility.  6. Constipation due to opioids, decreased mobility.  7. UTI, present on admission.        Other Chronic Medical Problems:      1. HLD.  2. Osteoarthritis.  3. Osteoporosis.       Allergies:         Allergies   Allergen Reactions     Celebrex [Celecoxib] Hives     Sulfamethoxazole Hives           Discharge Medications:        Discharge Medication List as of 2/19/2022 11:00 AM      START taking these medications    Details   acetaminophen (TYLENOL) 500 MG tablet Take 2 tablets (1,000 mg) by mouth every 6 hours as needed for mild pain or fever ; maximum 4 grams/day of acetaminophen from all sources., Disp-60 tablet, R-1, E-Prescribe      aspirin (ASA) 325 MG EC tablet Take 1 tablet (325 mg) by mouth daily, Disp-30 tablet, R-0, E-Prescribe      bisacodyl (DULCOLAX) 10 MG suppository Place 1 suppository (10 mg) rectally daily as needed for constipation, Disp-10 suppository, R-0, E-Prescribe      oxyCODONE (ROXICODONE) 5 MG tablet Take 1-2 tablets (5-10 mg) by mouth every 6 hours as needed for severe pain, Disp-20 tablet, R-0, E-Prescribe      polyethylene glycol (MIRALAX) 17 GM/Dose powder Take 17 g by mouth daily ; hold for loose stools/diarrhea., Disp-510 g, R-1, E-Prescribe       senna-docusate (SENOKOT-S/PERICOLACE) 8.6-50 MG tablet Take 2 tablets by mouth 2 times daily ; hold for loose stools/diarrhea., Disp-60 tablet, R-1, E-Prescribe         CONTINUE these medications which have CHANGED    Details   cephALEXin (KEFLEX) 250 MG capsule Take 1 capsule (250 mg) by mouth 4 times daily for 3 days Filled on 2/14/2022 for 7 days supply; stop after 2/20/2022., Disp-12 capsule, R-0, No Print Out         CONTINUE these medications which have NOT CHANGED    Details   alendronate (FOSAMAX) 70 MG tablet Take 70 mg by mouth every 7 days, Historical      calcium carbonate 500 mg, elemental, (OSCAL 500) 1250 (500 Ca) MG TABS tablet Take 1 tablet by mouth daily, Historical      estradiol (ESTRACE) 0.1 MG/GM vaginal cream Place 1 g vaginally twice a week Historical      Multiple Vitamins-Minerals (PRESERVISION AREDS 2 PO) Take 1 tablet by mouth daily, Historical      rosuvastatin (CRESTOR) 5 MG tablet Take 5 mg by mouth every evening, Historical      SUMAtriptan Succinate (IMITREX PO) Take 50 mg by mouth every 8 hours as needed for migraine As needed for migraine, Historical      vitamin D3 (CHOLECALCIFEROL) 50 mcg (2000 units) tablet Take 1 tablet by mouth daily, Historical      zolpidem (AMBIEN) 10 MG tablet Take 5 mg by mouth nightly as needed for sleep , Historical                 Discharge Instructions and Follow-Up:      Discharge Orders      Home Care Referral      Reason for your hospital stay    Assessment:    Right distal radius fracture  T11 compression fracture  Sacral fracture (acute vs subacute S3-4)     Follow-up and recommended labs and tests    Follow-up with Dr Perez for your compression fracture at [facility] in 1 week.    To arrange appointment or questions call: the care coordinator at [phone]  Follow-up with Dr Rojas in clinic in one week to recheck the wrist.  [phone]  [facility] phone number: [phone]     Activity    Your activity upon discharge: WBAT bilateral  lower extremities with walker.  NWB through right wrist, ok to use platform walker     Activity    Your activity upon discharge: activity as tolerated     Follow-up and recommended labs and tests    Follow-up with primary care provider, Abisai Ray, within 7 days for hospital follow-up.  No follow up labs or test are needed.  Follow-up with [facility] per Orthopedics.     Reason for your hospital stay    1. Right wrist fracture (radius and ulna) due to fall.  2. Acute-subacute T11 compression fracture due to fall.  3. Acute-subacute  sacral fracture due to fall.  4. Leukocytosis, suspect reactive.  5. Urinary retention, suspect due to meds, decreased mobility.  6. Constipation due to opioids, decreased mobility.  7. UTI, present on admission.     Walker Order for DME - ONLY FOR DME    I, the undersigned, certify that the above prescribed supplies are medically necessary for this patient and is both reasonable and necessary in reference to accepted standards of medical and necessary in reference to accepted standards of medical practice in the treatment of this patient's condition and is not prescribed as a convenience.      Diet    Follow this diet upon discharge: Orders Placed This Encounter      Combination Diet Regular Diet Adult             Consultations This Hospital Stay:      Orthopedic Surgery.        Admission History:      Please see the H&P by Dr. Cramer on 2/15/2022 for complete details. Briefly, This is a 66 year old adult with pmh including HLD, OA and osteoporosis, with recent COVID-19 (1 month PTA), who presented 2/15/2022 with a mechanical fall and right wrist and back pain and found with a right wrist fracture and a T11 compression fracture.        Problem Oriented Hospital Course:        Right wrist fracture (radius and ulna) due to fall - s/p ORIF right distal radius fracture 2/18/2022.   Acute-subacute T11 compression fracture due to fall.  Acute-subacute  sacral fracture due to fall.  * R  wrist x-rays 2/15 showed transverse fracture of the right radius distal metaphysis with moderate dorsal displacement, mild impaction, and mild volar apex angulation; oblique mildly displaced fracture of the ulnar styloid base with mild ulna positive variance. CT lumbar spine 2/15 showed moderate anterior wedge compression fracture deformity of the T11 vertebral body noted and appeared recent (acute-subacute); linear lucency through the sacrum at the S3-S4 junction noted and also likely represented an acute or subacute nondisplaced fracture. Right wrist splinted in the ED with follow-up x-rays 2/15 showing fractures in near anatomic position and alignment.   * Ortho evaluated 2/16; initially non-operative management recommended; brace not recommended at the time of consult.  * Ortho re-evaluated 2/17 and recommended surgery for right wrist fracture.  * Underwent above surgery on 2/18.  - Continue PRN acetaminophen, PRN oxycodone; minimize opioids as able.  - Continue right wrist splint; elevate when at rest; max lift, coffee cup.  - WBAT bilateral LEs with walker.  - Continue PT, OT.  - Follow-up with Ortho.    Leukocytosis, suspect reactive.  * WBC 11.7 on admit 2/15.  * WBC normalized 2/16.     Urinary retention, suspect due to meds, decreased mobility.  * Issues with urinary retention 2/16 which subsequently improved  * Voiding OK 2/17.    Constipation due to opioids, decreased mobility.  * Pt c/o constipation 2/17. Bowel regimen intensified.  * Had small BM but with large amount of flatus 2/18 with symptomatic improvement.  - Continue Miralax daily, senna-docusate BID, PRN bisacodyl suppository.  - Minimize opioids as able.  - Continue to increase activity as able.    UTI, present on admission.  * PTA on cephalexin.  - Continue cephalexin (started 2/14 PTA) - stop after 2/20 as previously prescribed.     HLD.  - Continue rosuvastatin.    COVID-19 testing.  COVID-19 PCR Results    COVID-19 PCR Results 3/10/21  1/6/22 2/15/22   COVID-19 Virus by PCR (External Result) Negative Positive (A)    SARS CoV2 PCR   Positive (A)   (A) Abnormal value       Comments are available for some flowsheets but are not being displayed.         COVID-19 Antibody Results, Testing for Immunity    COVID-19 Antibody Results, Testing for Immunity   No data to display.                   Pending Results:        Unresulted Labs Ordered in the Past 30 Days of this Admission     No orders found from 1/16/2022 to 2/16/2022.                Discharge Disposition:      Discharged to home.        Discharge Time:      Less than 30 minutes.          Condition and Physical on Discharge:    See progress note on the same date as this discharge summary.          Key Imaging Studies, Lab Findings and Procedures/Surgeries:        Results for orders placed or performed during the hospital encounter of 02/15/22   XR Wrist Right 3 Views    Narrative    WRIST RIGHT THREE OR MORE VIEWS  DATE/TIME: 2/15/2022 12:14 PM     INDICATION: Right-sided wrist pain after a fall.   COMPARISON: None.      Impression    IMPRESSION:  1.  Transverse fracture of the right radius distal metaphysis.  Moderate dorsal displacement, mild impaction, and mild volar apex  angulation. The distal articular surface remains congruent.  2.  Oblique mildly displaced fracture of the ulnar styloid base.  3.  Mild ulna positive variance.  4.  Trapeziectomy.  5.  Instrumented arthrodesis of the thumb MCP joint.   XR Lumbar Spine 2/3 Views    Narrative    LUMBAR SPINE TWO - THREE VIEWS 2/15/2022 12:14 PM     COMPARISON: None.    HISTORY: Fall, pain.      Impression    IMPRESSION: Five lumbar type vertebrae. Grade 1 degenerative  anterolisthesis of L4 upon L5. Alignment otherwise normal. Vertebral  body heights of the lumbar spine are normal. There is age  indeterminate mild anterior wedge compression fracture deformity of  the T11 vertebral body. No definite recent fractures. Facet  arthropathy at L3-L4,  L4-L5 and L5-S1. Loss of disc space height and  degenerative endplate spurring at L2-L3, L4-L5 and L5-S1. Questionable  lucency at the S3-S4 level of the sacrum. Dedicated sacral imaging  recommended to exclude fracture.    ROS DOMINGUEZ MD         SYSTEM ID:  [id number]   CT Lumbar Spine w/o Contrast    Narrative    CT OF THE LUMBAR SPINE WITHOUT CONTRAST  2/15/2022 1:24 PM     COMPARISON: Lumbar spine x-ray series same day.    HISTORY: Fall, low back pain.     TECHNIQUE: Axial images of the lumbar spine were acquired without  intravenous contrast. Multiplanar reformations were created from the  axial source images.        Impression    IMPRESSION: Moderate anterior wedge compression fracture deformity of  the T11 vertebral body again noted and appears recent  (acute-subacute). Linear lucency through the sacrum at the S3-S4  junction is again noted and also likely represents an acute or  subacute nondisplaced fracture. Degenerative grade 1 anterolisthesis  of L4 upon L5 again noted. Alignment otherwise normal. No other  fractures. Mild degenerative spinal canal narrowing at L4-L5. No other  spinal canal narrowing of the lumbar spine. No significant neural  foraminal stenosis at any level of the lumbar spine.      Radiation dose for this scan was reduced using automated exposure  control, adjustment of the mA and/or kV according to patient size, or  iterative reconstruction technique    ROS DOMINGUEZ MD         SYSTEM ID:  [id number]   XR Wrist Port Right 2 Views    Narrative    Examination:  XR WRIST PORTABLE RIGHT 2 VIEWS    Date:  2/15/2022 3:15 PM     Clinical Information: post reduction    Comparison: 2/15/2022 at 1200 hours.      Impression    Impression:    1.  Interval closed reduction and splinting of the distal radius and  ulnar fractures. Fractures are in near anatomic position and  alignment. No new bone or joint abnormality identified.    ALLAN MENDEZ MD         SYSTEM ID:  [id number]     SURGERY  2/18/2022:  PROCEDURE PERFORMED:    1.  Open reduction and internal fixation right less than 3-part intraarticular distal radius fracture, application of short arm splint.   2.  Brachioradialis tenotomy  3.  Application of short arm splint

## 2022-02-18 NOTE — ANESTHESIA PROCEDURE NOTES
Brachial plexus Procedure Note    Pre-Procedure   Staff -        Anesthesiologist:  Elmo Torres MD       Performed By: anesthesiologist       Location: pre-op       Procedure Start/Stop Times: 2/18/2022 12:48 PM and 2/18/2022 1:06 PM       Pre-Anesthestic Checklist: patient identified, IV checked, site marked, risks and benefits discussed, informed consent, monitors and equipment checked, pre-op evaluation, at physician/surgeon's request and post-op pain management  Timeout:       Correct Patient: Yes        Correct Procedure: Yes        Correct Site: Yes        Correct Position: Yes        Correct Laterality: Yes        Site Marked: Yes  Procedure Documentation  Procedure: Brachial plexus       Patient Position: supine       Patient Prep/Sterile Barriers: sterile gloves, mask       Skin prep: Chloraprep       Local skin infiltrated with 2 mL of 1% lidocaine.  (axillary approach).       Needle Type: insulated       Needle Gauge: 21.        Needle Length (millimeters): 50        Ultrasound guided       1. Ultrasound was used to identify targeted nerve, plexus, vascular marker, or fascial plane and place a needle adjacent to it in real-time.       2. Ultrasound was used to visualize the spread of anesthetic in close proximity to the above referenced structure.       3. A permanent image is entered into the patient's record.       4. The visualized anatomic structures appeared normal.       5. There were no apparent abnormal pathologic findings.    Assessment/Narrative         The placement was negative for: blood aspirated and painful injection       Paresthesias: No.     Bolus given via needle..        Secured via.        Insertion/Infusion Method: Single Shot       Complications: none       Injection made incrementally with aspirations every 5 mL.    Medication(s) Administered   Ropivacaine 0.5% w/ 1:400K Epi (Injection), 30 mL

## 2022-02-18 NOTE — PLAN OF CARE
Physical Therapy Discharge Summary    Reason for therapy discharge:    All goals and outcomes met, no further needs identified.    Progress towards therapy goal(s). See goals on Care Plan in [facility] electronic health record for goal details.  Goals met    Therapy recommendation(s):    Continued therapy is recommended.  Rationale/Recommendations:  Patient to discharge with spouse assist for ADLs and mobility tasks.  Patient issued front-wheeled walker with right platform attachment for discharge.  Recommend  PT to address remaining strength, balance, and endurance deficits.

## 2022-02-18 NOTE — ANESTHESIA PREPROCEDURE EVALUATION
Anesthesia Pre-Procedure Evaluation    Patient: Justus Lozano   MRN: [id number] : 1955        Preoperative Diagnosis: Distal radius fracture [S52.509A]    Procedure : Procedure(s):  OPEN REDUCTION INTERNAL FIXATION, OF DISTAL RADIUS FRACTURE          Past Medical History:   Diagnosis Date     Arthritis     bilateral thumbs     Disorder of bone and cartilage, unspecified      Hearing loss     BILAT HEARING AIDS     Hernia, inguinal      Insomnia      Metrorrhagia      Migraine, unspecified, without mention of intractable migraine without mention of status migrainosus      Thoracic or lumbosacral neuritis or radiculitis, unspecified      Urticaria, unspecified      Viral warts, unspecified       Past Surgical History:   Procedure Laterality Date     ABDOMEN SURGERY  2014    Robotic hysterectomy     ABDOMINAL SACROCOLPOPEXY       HC REMOVE TONSILS/ADENOIDS,12+ Y/O  10 TH GRADE     LAPAROSCOPIC HERNIORRHAPHY FEMORAL Bilateral 2015    Procedure: LAPAROSCOPIC HERNIORRHAPHY FEMORAL;  Surgeon: Tian Mata MD;  Location:  [facility]     PERINEOPLASTY       RECTOCELE REPAIR       ELISA BSO        Allergies   Allergen Reactions     Celebrex [Celecoxib] Hives     Sulfamethoxazole Hives      Social History     Tobacco Use     Smoking status: Never Smoker     Smokeless tobacco: Never Used   Substance Use Topics     Alcohol use: No      Wt Readings from Last 1 Encounters:   02/15/22 61.2 kg (135 lb)        Anesthesia Evaluation   Pt has had prior anesthetic. Type: General.    No history of anesthetic complications       ROS/MED HX  ENT/Pulmonary: Comment: covid 2022, recovered   (-) tobacco use, asthma and sleep apnea   Neurologic:     (+) no peripheral neuropathy  (-) no seizures and no CVA   Cardiovascular:     (+) Dyslipidemia ----- (-) hypertension, CAD and arrhythmias   METS/Exercise Tolerance:     Hematologic:       Musculoskeletal: Comment: T11 compression fracture  (+) fracture, Fracture  location: RUE,     GI/Hepatic:    (-) GERD   Renal/Genitourinary:    (-) renal disease   Endo:    (-) Type II DM and thyroid disease   Psychiatric/Substance Use:       Infectious Disease:    (-) Recent Fever   Malignancy:       Other:            Physical Exam    Airway  airway exam normal      Mallampati: II   TM distance: > 3 FB   Neck ROM: full   Mouth opening: > 3 cm    Respiratory Devices and Support         Dental  no notable dental history         Cardiovascular   cardiovascular exam normal          Pulmonary   pulmonary exam normal                OUTSIDE LABS:  CBC:   Lab Results   Component Value Date    WBC 6.9 02/16/2022    WBC 6.8 02/16/2022    HGB 13.2 02/16/2022    HGB 11.5 (L) 02/16/2022    HCT 40.2 02/16/2022    HCT 34.7 (L) 02/16/2022     02/16/2022     02/16/2022     BMP:   Lab Results   Component Value Date     02/16/2022     02/15/2022    POTASSIUM 4.0 02/16/2022    POTASSIUM 4.2 02/15/2022    CHLORIDE 102 02/16/2022    CHLORIDE 105 02/15/2022    CO2 26 02/16/2022    CO2 22 02/15/2022    BUN 8 02/16/2022    BUN 12 02/15/2022    CR 0.52 02/16/2022    CR 0.49 (L) 02/15/2022     (H) 02/16/2022     (H) 02/15/2022     COAGS:   Lab Results   Component Value Date    INR 1.08 02/15/2022     POC: No results found for: BGM, HCG, HCGS  HEPATIC:   Lab Results   Component Value Date    ALBUMIN 3.4 09/14/2016    PROTTOTAL 6.7 (L) 09/14/2016    ALT 18 09/14/2016    AST 13 09/14/2016    ALKPHOS 55 09/14/2016    BILITOTAL 0.5 09/14/2016     OTHER:   Lab Results   Component Value Date    LACT 1.9 09/14/2016    HANDY 8.5 02/16/2022    LIPASE 125 09/14/2016       Anesthesia Plan    ASA Status:  2   NPO Status:  NPO Appropriate    Anesthesia Type: Peripheral Nerve Block.              Consents    Anesthesia Plan(s) and associated risks, benefits, and realistic alternatives discussed. Questions answered and patient/representative(s) expressed understanding.    - Discussed:     -  Discussed with:  Patient         Postoperative Care    Pain management: Multi-modal analgesia.   PONV prophylaxis: Ondansetron (or other 5HT-3), Dexamethasone or Solumedrol     Comments:                Elmo Torres MD

## 2022-02-18 NOTE — PROGRESS NOTES
2/18/22; [id number]    MAIN OR for ORIF of Distal Radius;    A&O X4; A1 gb/walker; NPO this shift in prep for surgery; Voids adequately; constipated with many PRN's available; plan to discharge later this evening or tomorrow depending on post surgical condition.

## 2022-02-19 ENCOUNTER — APPOINTMENT (OUTPATIENT)
Dept: OCCUPATIONAL THERAPY | Facility: CLINIC | Age: 67
DRG: 501 | End: 2022-02-19
Payer: MEDICARE

## 2022-02-19 ENCOUNTER — HEALTH MAINTENANCE LETTER (OUTPATIENT)
Age: 67
End: 2022-02-19

## 2022-02-19 VITALS
HEART RATE: 74 BPM | OXYGEN SATURATION: 99 % | RESPIRATION RATE: 16 BRPM | TEMPERATURE: 96.8 F | DIASTOLIC BLOOD PRESSURE: 79 MMHG | BODY MASS INDEX: 19.33 KG/M2 | SYSTOLIC BLOOD PRESSURE: 172 MMHG | HEIGHT: 70 IN | WEIGHT: 135 LBS

## 2022-02-19 LAB
GLUCOSE BLD-MCNC: 139 MG/DL (ref 70–99)
HGB BLD-MCNC: 12.8 G/DL (ref 11.7–17.7)

## 2022-02-19 PROCEDURE — 250N000011 HC RX IP 250 OP 636: Performed by: PHYSICIAN ASSISTANT

## 2022-02-19 PROCEDURE — 99238 HOSP IP/OBS DSCHRG MGMT 30/<: CPT | Performed by: INTERNAL MEDICINE

## 2022-02-19 PROCEDURE — 36415 COLL VENOUS BLD VENIPUNCTURE: CPT | Performed by: PHYSICIAN ASSISTANT

## 2022-02-19 PROCEDURE — 0PSH04Z REPOSITION RIGHT RADIUS WITH INTERNAL FIXATION DEVICE, OPEN APPROACH: ICD-10-PCS | Performed by: ORTHOPAEDIC SURGERY

## 2022-02-19 PROCEDURE — 0L850ZZ DIVISION OF RIGHT LOWER ARM AND WRIST TENDON, OPEN APPROACH: ICD-10-PCS | Performed by: ORTHOPAEDIC SURGERY

## 2022-02-19 PROCEDURE — 250N000013 HC RX MED GY IP 250 OP 250 PS 637: Performed by: PHYSICIAN ASSISTANT

## 2022-02-19 PROCEDURE — 999N000147 HC STATISTIC PT IP EVAL DEFER

## 2022-02-19 PROCEDURE — 85018 HEMOGLOBIN: CPT | Performed by: PHYSICIAN ASSISTANT

## 2022-02-19 PROCEDURE — 82947 ASSAY GLUCOSE BLOOD QUANT: CPT | Performed by: INTERNAL MEDICINE

## 2022-02-19 PROCEDURE — 97535 SELF CARE MNGMENT TRAINING: CPT | Mod: GO

## 2022-02-19 RX ORDER — ASPIRIN 325 MG
325 TABLET, DELAYED RELEASE (ENTERIC COATED) ORAL DAILY
Qty: 30 TABLET | Refills: 0 | Status: SHIPPED | OUTPATIENT
Start: 2022-02-20 | End: 2022-06-06

## 2022-02-19 RX ADMIN — OXYCODONE HYDROCHLORIDE 10 MG: 5 TABLET ORAL at 06:30

## 2022-02-19 RX ADMIN — ONDANSETRON 4 MG: 4 TABLET, ORALLY DISINTEGRATING ORAL at 03:46

## 2022-02-19 RX ADMIN — CEFAZOLIN 1 G: 1 INJECTION, POWDER, FOR SOLUTION INTRAMUSCULAR; INTRAVENOUS at 04:27

## 2022-02-19 RX ADMIN — Medication 1 CAPSULE: at 08:20

## 2022-02-19 RX ADMIN — ASPIRIN 325 MG: 325 TABLET, COATED ORAL at 08:20

## 2022-02-19 RX ADMIN — SENNOSIDES AND DOCUSATE SODIUM 2 TABLET: 50; 8.6 TABLET ORAL at 08:20

## 2022-02-19 RX ADMIN — OXYCODONE HYDROCHLORIDE 10 MG: 5 TABLET ORAL at 10:54

## 2022-02-19 RX ADMIN — Medication 50 MCG: at 08:20

## 2022-02-19 RX ADMIN — Medication 600 MG: at 08:20

## 2022-02-19 RX ADMIN — OXYCODONE HYDROCHLORIDE 10 MG: 5 TABLET ORAL at 02:35

## 2022-02-19 RX ADMIN — POLYETHYLENE GLYCOL 3350 17 G: 17 POWDER, FOR SOLUTION ORAL at 08:20

## 2022-02-19 ASSESSMENT — ACTIVITIES OF DAILY LIVING (ADL)
ADLS_ACUITY_SCORE: 13

## 2022-02-19 NOTE — PLAN OF CARE
Physical Therapy: Orders received. Chart reviewed and discussed with care team.? Physical Therapy not indicated, screened pt. Pt appears to be moving the same post ORIF compared to last IP PT session on 2/18/22. Discussed with pt using walker for ambulation outside of the home and in hallways, pt will have supervision and assist at home from spouse.? Defer discharge recommendations to OT and medical team.? Will complete orders.

## 2022-02-19 NOTE — PROGRESS NOTES
Patient is discharing home today with . Belongings accounted for and discharge instructions reviewed with patient and medications sent home with her.

## 2022-02-19 NOTE — PROGRESS NOTES
"ORTHOPEDIC UPPER EXTREMITY PROGRESS NOTE    POD# 1  Patient is a 66 year old adult who underwent Procedure(s):  OPEN REDUCTION INTERNAL FIXATION, OF DISTAL RADIUS FRACTURE on 2/15/2022 - 2/18/2022 on right. Pain is decently controlled. Denies numbness or tingling.  Discussed discharge recommendations and elevation.  Discussed follow up and spine information.    Vitals:   BP (!) 172/79 (BP Location: Left arm)   Pulse 74   Temp 96.8  F (36  C) (Axillary)   Resp 16   Ht 1.778 m (5' 10\")   Wt 61.2 kg (135 lb)   SpO2 99%   BMI 19.37 kg/m        EXAM   The patient is awake and alert, up in bed.   Sensation is intact.  Digital Flexion/Extension maintained.   Brisk cap refill.   The incision is covered, short arm splint well fitted and intact.     Labs: Recent Labs   Lab Test 02/19/22  0630 02/16/22  1230 02/16/22  0642 02/15/22  1249   HGB 12.8 13.2 11.5* 13.6   INR  --   --   --  1.08       ASSESSMENT  s/p right distal radius fracture  T11 compression fracture - follow up with spine, no brace  Sacral fracture (acute vs subacute S3-4)  PLAN  1. Continue with splint to rue, no lifting more than a coffee cup  2. Weight Bearing as tolerated to bilateral lower extremities   3. Keep splint clean, dry and intact  4. Discharge anticipated date today to home   5. Cont Pain Control has been taking oxycodone    6. Follow up with spine, primary care and ortho in 2 weeks    Kjerstin Foss PA-C [facility]    "

## 2022-02-19 NOTE — PLAN OF CARE
Goal Outcome Evaluation:    Plan of Care Reviewed With: patient     Arrived to floor from PACU at around 1615. A&Ox4. VSS on RA. Tolerating regular diet. CMS and neuros intact ex for RUE, numbness present due to nerve block in sling, has dressing and ace wrap from hand to elbow, cap refill below 3 seconds. Tylenol given for 5/10 pain which was effective. Ao1 with GBW voiding adequatly in BR. Complains of constipation, Miralax given but refused senna. discharge possibly tomorrow.

## 2022-02-19 NOTE — PROVIDER NOTIFICATION
Paged Dr. Dixon about discharge today, asked to put in discharge orders or let us know she is not discharging.

## 2022-02-19 NOTE — OP NOTE
[facility]  Operative Note  Open Reduction Internal Fixation distal radius      Justus Lozano MRN# [id number]   YOB: 1955  Procedure Date:  2/19/2022  Age: 66 year old     PREOPERATIVE DIAGNOSIS:     1.  Displaced right distal radius intra-articular fracture.        POSTOPERATIVE DIAGNOSES:     1.  Displaced right distal radius intra-articular fracture.      PROCEDURE PERFORMED:    1.  Open reduction and internal fixation right less than 3-part intraarticular distal radius fracture, application of short arm splint.   2.  Brachioradialis tenotomy  3.  Application of short arm splint     SURGEON:  Damien Flores MD     ASSISTANT:  CHADWICK Gonzalez who was critical for positioning patient, helping obtain reduction, retraction during exposure and implant placement, as well as closure.     ANESTHESIA:  General.      ESTIMATED BLOOD LOSS:   20 mL      FINDINGS:  Comminuted distal radius fracture.       INDICATIONS:   Justus Lozano is a 66 year old-year-old adult who fell, having a fracture of the distal radius.  Loss of height, dorsal angulation, displacement of the radial styloid, and intra-articular displacement.  Discussed operative versus nonoperative management, the risks and benefits of each.  Risks of injury and surgery discussed include but are not limited to bleeding, infection, damage to surrounding neurovascular structures, malunion, delayed union, nonunion, post-traumatic arthritis, wrist stiffness and anesthetic complications.  No guarantees given or implied.  Patient expressed understanding, wishes to proceed, and consent signed.     IMPLANTS:  Synthes variable angle locking plate.      DESCRIPTION OF PROCEDURE:  The patient identified in the preoperative holding area per hospital policy and the correct operative site marked.  Given a block.  Transferred to the OR.  Ancef given.  Anesthesia induced.  Chlorhexidine prescrub, ChloraPrep, draped.  A timeout was performed.  All  in the room agreed.      Infiltrated with local 0.5% marcaine and epi.  Tourniquet inflated to 250 mm Hg.  Standard volar approach to distal radius dissecting through the skin, subcutaneous tissue, identifying the FCR tendon.  Incisied the FCR fascia.  Swept aside FPL.  Raised the radial border of the pronator quadratus.  Exposed the fracture site.  Tenotomy of brachioradialis insertion from radial styloid fragment under direct visualization.  Used a laminar  to restore coronal angulation.  Cleaned and reduced the fracture site provisionally pinning through radial styloid.  Selected a Synthes variable angle locking plate, adjusting the height with oblong screw and oblong hole, and provisionally pinning it in place.  Confirmed reduction and plate placement under C-arm.  Placed locking screws distally after bringing plate to bone under fluoroscopic guidance. Additional cortical screws proximally restored volar angulation.  C-arm showed acceptable articular reduction with restoration of radial height and angulation.  Safe position of the implants.  Copiously irrigated the wound, tourniquet let down, hemostasis obtained, closed in layered fashion with 3-0 Vicryl, 4-0 Monocryl and Dermabond.  Sterile dressing applied as well as a volar slab splint.  Awakened, transferred stable to the PACU.      POSTOPERATIVE PLAN:     1.  No lifting heavier than a coffee cup left upper extremity.   2.  Pain control scheduled ibuprofen and tylenol with first line break though tramadol and second line breakthrough oxycodone.  Minimize narcotics.     3.  Elevate arm.  Keep splint clean and dry.  4.  Follow up with primary care provider over the next 1-2 months for osteoporosis workup and treatment.  5.  Vitamin D 2000 units daily x 2 months  6.  Vitamin C 500 mg daily x 2 months    Follow Up:  Dr. Flores's clinic in 2 weeks at   [facility]  [facility]    Monday 1-5 PM  and Thursday 7:30-12:00 AM  [address]   [address]  [place], [place]  [phone]    Or    Tuesday 7:30-5 PM  [address] [place] [address]  [phone]            VANESSA MCCOY MD

## 2022-02-19 NOTE — PLAN OF CARE
Occupational Therapy Discharge Summary    Reason for therapy discharge:    Discharged to home.    Progress towards therapy goal(s). See goals on Care Plan in [facility] electronic health record for goal details.  Goals partially met.  Barriers to achieving goals:   discharge from facility.    Therapy recommendation(s):    No further therapy is recommended.     OT Discharge Planning   OT Discharge Recommendation (DC Rec) home with assist;home with home care occupational therapy   OT Rationale for DC Rec Pt limited by pain but mobilizing well, overall CGA with transfers and ambulation with platform FWW. Pt limited by R distal radius fx and is R hand dominant, states her spouse can assist with I/ADLs as needed. Pt would benefit from shower chair and adaptive tools for eating

## 2022-02-19 NOTE — PROGRESS NOTES
"[facility]    Internal Medicine Hospitalist Progress Note  02/19/2022  I evaluated patient on the above date.    Hemant Dixon Jr., MD  [phone] (p)  Text Page  Vocera        Assessment & Plan New actions/orders today (02/19/2022) are underlined.    D/C home: see discharge summary for diagnoses.    Disposition Plan   Expected discharge: possibly today after surgery recommended to prior living arrangement once OK with Ortho.  Entered: Hemant Dixon MD 02/19/2022, 11:17 AM         Interval History   Had small BM with large release of flatus yesterday and felt much better.  Doing OK today; awaiting surgery.    -Data reviewed today: I reviewed all new labs and imaging over the last 24 hours. I personally reviewed no images or EKG's today.    Physical Exam    , Blood pressure (!) 172/79, pulse 74, temperature 96.8  F (36  C), temperature source Axillary, resp. rate 16, height 1.778 m (5' 10\"), weight 61.2 kg (135 lb), SpO2 99 %.  Vitals:    02/15/22 1030   Weight: 61.2 kg (135 lb)     Vital Signs with Ranges  Temp:  [96.8  F (36  C)-99.5  F (37.5  C)] 96.8  F (36  C)  Pulse:  [74-97] 74  Resp:  [11-28] 16  BP: (107-172)/(73-87) 172/79  SpO2:  [96 %-100 %] 99 %  Patient Vitals for the past 24 hrs:   BP Temp Temp src Pulse Resp SpO2   02/19/22 0737 (!) 172/79 96.8  F (36  C) Axillary 74 16 99 %   02/18/22 2300 (!) 146/79 99  F (37.2  C) Oral 88 16 97 %   02/18/22 2008 136/82 99.5  F (37.5  C) Oral 92 16 96 %   02/18/22 1616 134/83 98.2  F (36.8  C) Oral 89 16 97 %   02/18/22 1540 136/77 -- -- 80 14 97 %   02/18/22 1530 131/77 -- -- 75 11 99 %   02/18/22 1520 139/75 -- -- 79 12 99 %   02/18/22 1510 136/77 -- -- 81 14 97 %   02/18/22 1500 130/79 -- -- 77 12 100 %   02/18/22 1450 130/73 -- -- 77 28 100 %   02/18/22 1440 107/87 98  F (36.7  C) Temporal 97 19 99 %   02/18/22 1247 (!) 150/85 98.5  F (36.9  C) Temporal 82 16 96 %     I/O's Last 24 hours  I/O last 3 completed shifts:  In: 890 " [P.O.:240; I.V.:650]  Out: 260 [Urine:250; Blood:10]    Constitutional: Awake, alert, pleasant, conversant.  Respiratory:   Cardiovascular:   GI:   Skin/Integumen: Right upper extremity in cast/splint; some swelling in fingers.  Other:        Data   Recent Labs   Lab 02/19/22  0630 02/16/22  1230 02/16/22  0642 02/15/22  2120 02/15/22  1249   WBC  --  6.9 6.8  --  11.7*   HGB 12.8 13.2 11.5*  --  13.6   MCV  --  93 92  --  93   PLT  --  267 252  --  235   INR  --   --   --   --  1.08   NA  --  133  --   --  134   POTASSIUM  --  4.0  --   --  4.2   CHLORIDE  --  102  --   --  105   CO2  --  26  --   --  22   BUN  --  8  --   --  12   CR  --  0.52  --   --  0.49*   ANIONGAP  --  5  --   --  7   HANDY  --  8.5  --   --  8.6   * 117*  --  115* 102*     Recent Labs   Lab Test 02/19/22  0630 02/16/22  1230 02/15/22  2120 02/15/22  1249 09/14/16  1836   * 117* 115* 102* 139*     Recent Labs   Lab 02/16/22  1230 02/16/22  0642 02/15/22  1249   WBC 6.9 6.8 11.7*         Recent Results (from the past 24 hour(s))   XR Surgery UZIEL Fluoro L/T 5 Min w Stills    Narrative    XR SURGERY UZIEL FLUORO LESS THAN 5 MIN W STILLS 2/18/2022 2:20 PM     HISTORY: Right wrist ORIF    NUMBER OF IMAGES ACQUIRED: 4    FLUOROSCOPY TIME: .3      Impression    IMPRESSION: Volar plate screw fixation has been placed across the  distal radial fracture in the interval.    ZITA LIN MD         SYSTEM ID:  [id number]       Medications   All medications were reviewed.    lactated ringers 75 mL/hr at 02/18/22 1623       aspirin  325 mg Oral Daily     calcium carbonate  600 mg Oral Daily     multivitamin  with lutein  1 capsule Oral Daily     polyethylene glycol  17 g Oral BID     rosuvastatin  5 mg Oral QPM     senna-docusate  1 tablet Oral BID    Or     senna-docusate  2 tablet Oral BID     sodium chloride (PF)  3 mL Intracatheter Q8H     sodium chloride (PF)  3 mL Intracatheter Q8H     vitamin D3  50 mcg Oral Daily     [DISCONTINUED]  acetaminophen **OR** acetaminophen, acetaminophen, sore throat lozenge, bisacodyl, HYDROmorphone, lidocaine 4%, lidocaine 4%, lidocaine (buffered or not buffered), lidocaine (buffered or not buffered), magnesium hydroxide, melatonin, naloxone **OR** naloxone **OR** naloxone **OR** naloxone, ondansetron **OR** ondansetron, oxyCODONE, sodium chloride (PF), sodium chloride (PF), sodium phosphate, zolpidem

## 2022-02-19 NOTE — PLAN OF CARE
Shift Summary [id number]    Admitting Diagnosis: Closed displaced fracture of styloid process of right ulna, initial encounter [S52.611A]  Closed fracture of distal end of right radius, unspecified fracture morphology, initial encounter [S52.501A]  Closed fracture of sacrum, unspecified portion of sacrum, initial encounter (H) [S32.10XA]  Compression fracture of T11 vertebra, initial encounter (H) [S22.080A]     Vitals VSS, with some HTN  Pain 10/10. Taking Oxycodone PRN. Last dose 0630  A&Ox4    Voiding Bathroom  Mobility Ast x 1 GB/W  CMS Intact  Lung Sounds Clear On RA  GI No BM on shift  Dressing RIGO    Orders Placed This Encounter      Diet      Advance Diet as Tolerated: Regular Diet Adult       Plan:  Patient is POD 1 from wrist surgery, there are no signs of bleeding but unable to remove wrap or dressing to assess incision.  Bruising of the fingers, good cap refill.  Provided ice and PRN medications with good effect.      Plan is to discharge this AM.

## 2022-02-21 ENCOUNTER — APPOINTMENT (OUTPATIENT)
Dept: GENERAL RADIOLOGY | Facility: CLINIC | Age: 67
End: 2022-02-21
Attending: EMERGENCY MEDICINE
Payer: MEDICARE

## 2022-02-21 ENCOUNTER — HOSPITAL ENCOUNTER (EMERGENCY)
Facility: CLINIC | Age: 67
Discharge: HOME | End: 2022-02-21
Attending: EMERGENCY MEDICINE | Admitting: EMERGENCY MEDICINE
Payer: MEDICARE

## 2022-02-21 ENCOUNTER — PATIENT OUTREACH (OUTPATIENT)
Dept: CARE COORDINATION | Facility: CLINIC | Age: 67
End: 2022-02-21
Payer: COMMERCIAL

## 2022-02-21 ENCOUNTER — APPOINTMENT (OUTPATIENT)
Dept: CT IMAGING | Facility: CLINIC | Age: 67
End: 2022-02-21
Attending: EMERGENCY MEDICINE
Payer: MEDICARE

## 2022-02-21 VITALS
BODY MASS INDEX: 19.37 KG/M2 | WEIGHT: 135 LBS | TEMPERATURE: 98.2 F | DIASTOLIC BLOOD PRESSURE: 81 MMHG | HEART RATE: 93 BPM | RESPIRATION RATE: 16 BRPM | SYSTOLIC BLOOD PRESSURE: 158 MMHG | OXYGEN SATURATION: 97 %

## 2022-02-21 DIAGNOSIS — S22.080A COMPRESSION FRACTURE OF T11 VERTEBRA, INITIAL ENCOUNTER (H): ICD-10-CM

## 2022-02-21 DIAGNOSIS — N28.1 RENAL CYST: ICD-10-CM

## 2022-02-21 DIAGNOSIS — Z71.89 OTHER SPECIFIED COUNSELING: ICD-10-CM

## 2022-02-21 DIAGNOSIS — K86.2 PANCREATIC CYST: ICD-10-CM

## 2022-02-21 DIAGNOSIS — K59.00 CONSTIPATION, UNSPECIFIED CONSTIPATION TYPE: ICD-10-CM

## 2022-02-21 LAB
CREAT BLD-MCNC: 0.4 MG/DL (ref 0.5–1.3)
GFR SERPL CREATININE-BSD FRML MDRD: >60 ML/MIN/1.73M2

## 2022-02-21 PROCEDURE — 96372 THER/PROPH/DIAG INJ SC/IM: CPT | Mod: 59 | Performed by: EMERGENCY MEDICINE

## 2022-02-21 PROCEDURE — 82565 ASSAY OF CREATININE: CPT

## 2022-02-21 PROCEDURE — 74177 CT ABD & PELVIS W/CONTRAST: CPT | Mod: MG

## 2022-02-21 PROCEDURE — 250N000013 HC RX MED GY IP 250 OP 250 PS 637: Performed by: EMERGENCY MEDICINE

## 2022-02-21 PROCEDURE — 99285 EMERGENCY DEPT VISIT HI MDM: CPT | Mod: 25

## 2022-02-21 PROCEDURE — 74019 RADEX ABDOMEN 2 VIEWS: CPT

## 2022-02-21 PROCEDURE — 250N000011 HC RX IP 250 OP 636: Performed by: EMERGENCY MEDICINE

## 2022-02-21 PROCEDURE — 250N000009 HC RX 250: Performed by: EMERGENCY MEDICINE

## 2022-02-21 RX ORDER — LIDOCAINE HYDROCHLORIDE 20 MG/ML
10 JELLY TOPICAL ONCE
Status: COMPLETED | OUTPATIENT
Start: 2022-02-21 | End: 2022-02-21

## 2022-02-21 RX ORDER — IOPAMIDOL 755 MG/ML
68 INJECTION, SOLUTION INTRAVASCULAR ONCE
Status: COMPLETED | OUTPATIENT
Start: 2022-02-21 | End: 2022-02-21

## 2022-02-21 RX ORDER — MAGNESIUM CARB/ALUMINUM HYDROX 105-160MG
296 TABLET,CHEWABLE ORAL ONCE
Status: COMPLETED | OUTPATIENT
Start: 2022-02-21 | End: 2022-02-21

## 2022-02-21 RX ADMIN — SODIUM CHLORIDE 60 ML: 900 INJECTION INTRAVENOUS at 16:48

## 2022-02-21 RX ADMIN — IOPAMIDOL 68 ML: 755 INJECTION, SOLUTION INTRAVENOUS at 16:48

## 2022-02-21 RX ADMIN — LIDOCAINE HYDROCHLORIDE 10 ML: 20 JELLY TOPICAL at 15:05

## 2022-02-21 RX ADMIN — MAGNESIUM CITRATE 286 ML: 1.75 LIQUID ORAL at 15:05

## 2022-02-21 RX ADMIN — METHYLNALTREXONE BROMIDE 8 MG: 12 INJECTION, SOLUTION SUBCUTANEOUS at 13:42

## 2022-02-21 RX ADMIN — MAGNESIUM CITRATE 296 ML: 1.75 LIQUID ORAL at 18:10

## 2022-02-21 NOTE — PROGRESS NOTES
PLEASE NOTE PATIENT HAS ALREADY DISCHARGED ON 2/19/22    [facility] called this morning informing that they have NOT received discharge orders yet on patient. Writer faxed orders along with discharge Summary, OP report and PT note to [facility] at fax # [phone].

## 2022-02-21 NOTE — DISCHARGE INSTRUCTIONS
Take your miralax twice a day. Senna twice a day.  Return to ED if vomiting, worsening  abdominal pain, or no BM in 24 hours.  Your CT showed an incidental very small pancreatic and renal cyst.  This should be followed up with your primary care doctor nonurgently.

## 2022-02-21 NOTE — PROGRESS NOTES
Clinic Care Coordination Contact  Community Health Worker Initial Outreach    Pt stated that she is not feeling well today ans she called PCP office, scheduled an appt today afternoon with PCP.     Clinic Care Coordination Contact   Baldomero Saba: Post-Discharge Note  SITUATION                                                      Admission:    Admission Date: 02/15/22   Reason for Admission: 1. Right wrist fracture (radius and ulna) due to fall - s/p ORIF right distal radius fracture 2/18/2022.2. Acute-subacute T11 compression fracture due to fall.3. Acute-subacute  sacral fracture due to fall.4. Leukocytosis, suspect reactive.5. Urinary retention, suspect due to meds, decreased mobility.6. Constipation due to opioids, decreased mobility.7. UTI, present on admission.  Discharge:   Discharge Date: 02/19/22  Discharge Diagnosis: 1. Right wrist fracture (radius and ulna) due to fall - s/p ORIF right distal radius fracture 2/18/2022.2. Acute-subacute T11 compression fracture due to fall.3. Acute-subacute  sacral fracture due to fall.4. Leukocytosis, suspect reactive.5. Urinary retention, suspect due to meds, decreased mobility.6. Constipation due to opioids, decreased mobility.7. UTI, present on admission.    BACKGROUND                                                      Per hospital discharge summary and inpatient provider notes:    66 year old adult with past medical hx of Covid 1 month ago an is recovered, who presents with fall and right wrist and back pain    ASSESSMENT      Enrollment  Primary Care Care Coordination Status: Not a Candidate    Discharge Assessment  How are you doing now that you are home?: feeling the same  How are your symptoms? (Red Flag symptoms escalate to triage hotline per guidelines): Unchanged  Do you feel your condition is stable enough to be safe at home until your provider visit?: Yes  Does the patient have their discharge instructions? : Yes  Does the patient have questions  regarding their discharge instructions? : No  Were you started on any new medications or were there changes to any of your previous medications? : No  Does the patient have all of their medications?: Yes  Do you have questions regarding any of your medications? : No  Do you have all of your needed medical supplies or equipment (DME)?  (i.e. oxygen tank, CPAP, cane, etc.): Yes  Discharge follow-up appointment scheduled within 14 calendar days? : Yes  Discharge Follow Up Appointment Date: 02/21/22  Discharge Follow Up Appointment Scheduled with?: Primary Care Provider    Post-op ([facility] Only)  If the patient had a surgery or procedure, do they have any questions for a nurse?: No         PLAN                                                      Outpatient Plan:     No future appointments.    Follow-up with Dr Perez for your compression fracture at [facility] in 1 week.    To arrange appointment or questions call: the care coordinator at [phone]  Follow-up with Dr Rojas in clinic in one week to recheck the wrist.  [phone]  Main [facility] [place] phone number: [phone]    For any urgent concerns, please contact our 24 hour nurse triage line: [phone] ([phone])           PJ Bower  [phone]  [facility]

## 2022-02-21 NOTE — ED TRIAGE NOTES
Pt was recently admitted after a fall and surgery to wrist.  Pt states she has been on pain medicine and taking home remedies for constipation.  Pt states able to pass gas but has not had a BM.  Hx of an ileus in the past.

## 2022-02-23 ASSESSMENT — ENCOUNTER SYMPTOMS
ABDOMINAL PAIN: 1
FEVER: 0
DIARRHEA: 0
NAUSEA: 1
VOMITING: 0
ARTHRALGIAS: 1
CONSTIPATION: 1

## 2022-02-24 NOTE — ED PROVIDER NOTES
History     Chief Complaint:    Constipation      HPI    Justus Lozano is a 66 year old female who presents to the emergency department with constipation.  She was hospitalized over a week ago for mechanical fall sustaining a thoracic compression fracture and a right wrist fracture.  She underwent surgical repair of her wrist and was discharged home after couple of days.  She has been constipated ever since her fall.  She did have stool softeners and laxative while in the hospital, but despite this she has not had a regular bowel movement.  She feels bloated.  No difficulties with urination.  She is taking oxycodone 5 mg for a total of 15 mg a day.  She has had opiate-induced constipation many years ago with previous surgery and did get a dose of Relistor which helped.  She states she is not been eating as much either.  She states she has been taking MiraLAX at home as well as a laxative she did try a fleets enema without any success.  She is not vomiting feels a little nauseated.  Her wrist seems to be healing appropriately.  She denies any focal weakness, numbness.  She has back pain from her fall with a compression fracture but nothing new.    Review of Systems   Constitutional: Negative for fever.   Gastrointestinal: Positive for abdominal pain, constipation and nausea. Negative for diarrhea and vomiting.   Musculoskeletal: Positive for arthralgias.   All other systems reviewed and are negative.        Allergies:    Celebrex [Celecoxib]  Sulfamethoxazole      Medications:      acetaminophen (TYLENOL) 500 MG tablet  alendronate (FOSAMAX) 70 MG tablet  aspirin (ASA) 325 MG EC tablet  bisacodyl (DULCOLAX) 10 MG suppository  calcium carbonate 500 mg, elemental, (OSCAL 500) 1250 (500 Ca) MG TABS tablet  estradiol (ESTRACE) 0.1 MG/GM vaginal cream  Multiple Vitamins-Minerals (PRESERVISION AREDS 2 PO)  oxyCODONE (ROXICODONE) 5 MG tablet  polyethylene glycol (MIRALAX) 17 GM/Dose powder  rosuvastatin (CRESTOR) 5 MG  tablet  senna-docusate (SENOKOT-S/PERICOLACE) 8.6-50 MG tablet  SUMAtriptan Succinate (IMITREX PO)  vitamin D3 (CHOLECALCIFEROL) 50 mcg (2000 units) tablet  zolpidem (AMBIEN) 10 MG tablet        Past Medical History:      Past Medical History:   Diagnosis Date     Arthritis      Disorder of bone and cartilage, unspecified      Hearing loss      Hernia, inguinal      Insomnia      Metrorrhagia      Migraine, unspecified, without mention of intractable migraine without mention of status migrainosus      Thoracic or lumbosacral neuritis or radiculitis, unspecified      Urticaria, unspecified      Viral warts, unspecified      Patient Active Problem List    Diagnosis Date Noted     Right wrist Fracture 02/15/2022     Priority: Medium     Compression fracture of T11 vertebra, initial encounter (H) 02/15/2022     Priority: Medium     COVID-19 Jan 2022, Now Recovered (still positive 2/15/22) 02/15/2022     Priority: Medium     Fall 02/15/2022     Priority: Medium        Past Surgical History:      Past Surgical History:   Procedure Laterality Date     ABDOMEN SURGERY  5-    Robotic hysterectomy     ABDOMINAL SACROCOLPOPEXY       HC REMOVE TONSILS/ADENOIDS,12+ Y/O  10 TH GRADE     LAPAROSCOPIC HERNIORRHAPHY FEMORAL Bilateral 12/7/2015    Procedure: LAPAROSCOPIC HERNIORRHAPHY FEMORAL;  Surgeon: Tian Mata MD;  Location:  [facility]     OPEN REDUCTION INTERNAL FIXATION WRIST Right 2/18/2022    Procedure: OPEN REDUCTION INTERNAL FIXATION, OF DISTAL RADIUS FRACTURE;  Surgeon: Damien Flores MD;  Location:  OR     PERINEOPLASTY       [facility]PA[facility]       [facility]         Family History:      Family History   Problem Relation Age of Onset     Hypertension Mother      Osteoporosis Mother      Heart Disease Father      Cerebrovascular Disease Father        Social History:  Presents with her .    Physical Exam     No data found.  Blood pressure 158/81.  Temp 98.2.  Heart rate 93 bpm.  Respiratory rate  16.  Oxygen saturations 97% on room air.  Physical Exam    Physical Exam   Constitutional:  Patient is oriented to person, place, and time. They appear well-developed and well-nourished. Mild distress secondary to constipation   HENT:   Mouth/Throat:   Oropharynx is clear and moist.   Eyes:    Conjunctivae normal and EOM are normal. Pupils are equal, round, and reactive to light.   Neck:    Normal range of motion.   Cardiovascular: Normal rate, regular rhythm and normal heart sounds.  Exam reveals no gallop and no friction rub.  No murmur heard.  Pulmonary/Chest:  Effort normal and breath sounds normal. Patient has no wheezes. Patient has no rales.   Abdominal:   Soft. Bowel sounds are hypoactive.  No high-pitched bowel sounds.  No focal masses.  No focal tenderness.    Genitourinary:             Normal rectal tone.  No hemorrhoids.  Musculoskeletal:  Patient's right arm is in a cast.  Fingers are moderately swollen and ecchymotic however patient states this is much improved.  Discomfort in the back from previously mentioned compression fracture.  Negative straight leg raise.  Normal sensation bilaterally   Neurological:   Patient is alert and oriented to person, place, and time. Patient has normal strength. No cranial nerve deficit or sensory deficit. GCS 15  Skin:   Skin is warm and dry. No rash noted. No erythema.   Psychiatric:   Patient has a normal mood and affect. Patient's behavior is normal. Judgment and thought content normal.         Emergency Department Course     Imaging:  CT Abdomen Pelvis w Contrast   Final Result   IMPRESSION:    1.  Age-indeterminate wedge compression fracture of T11. Correlate for   tenderness in this region.   2.  Large amount of formed stool in the colon.   3.  Upper normal size of the common hepatic duct measuring 7 mm,   likely normal for patient's age. No obstructing calcified stones or   mass.   4.  Small cyst in the inferior pancreatic head measuring 7 mm, likely   a side  branch intraductal papillary mucinous neoplasm. CT follow-up   guidelines.   5.  Indeterminate mild right hydroureter without an obstructing   calculus. Correlate clinically and consider CT cystogram or cystoscopy   on a nonemergent basis for complete characterization.      REFERENCE:   International Consensus Guidelines for Management of IPMN and MCN of   the Pancreas. Pancreatology 12 (2012) 183-197.      Asymptomatic patient without high-risk stigmata of malignancy   (obstructive jaundice with cystic lesion in head of pancreas,   enhancing solid component within cyst, or main pancreatic duct greater   than 10 mm), and without worrisome features (cyst greater than 3 cm,   thickened/enhancing cyst walls, main pancreatic duct 5-9 mm, mural   nodule, or abrupt change in caliber of pancreatic duct with distal   pancreatic atrophy).      Size of largest cyst:   Less than 1 cm: CT or MRI with contrast in 2-3 years.      Consider Gastroenterology consultation for all categories of   pancreatic cysts.      MARTHA PAULA MD            SYSTEM ID:  [id number]      XR Abdomen 2 Views   Final Result   IMPRESSION: Large volume of retained colonic stool compatible with   constipation. No small bowel distention or free air. No abnormal   calcifications.      ANDRES ZAVALA MD            SYSTEM ID:  [id number]          Laboratory:  Labs Ordered and Resulted from Time of ED Arrival to Time of ED Departure   ISTAT CREATININE POCT - Abnormal       Result Value    Creatinine POCT 0.4 (*)     GFR, ESTIMATED POCT >60         Procedures:  Pink lady enema with minimal results.    Emergency Department Course:         Reviewed:    I reviewed nursing notes, vitals and past history    Assessments:   I obtained history and examined the patient as noted above.    I rechecked the patient and explained findings.              Interventions:    Medications   methylnaltrexone (RELISTOR) injection 8 mg (8 mg Subcutaneous Given 2/21/22 1342)   Enema  Compound (docusate/mag cit/mineral oil/NaPhos) SIMPLE (286 mLs Rectal Given 2/21/22 1505)   lidocaine (XYLOCAINE) 2 % external gel 10 mL (10 mLs Urethral Given 2/21/22 1505)   Saline (60 mLs As instructed Given 2/21/22 1648)   iopamidol (ISOVUE-370) solution 68 mL (68 mLs Intravenous Given 2/21/22 1648)   magnesium citrate solution 296 mL (296 mLs Oral Given 2/21/22 1810)       Disposition:  The patient was discharged to home.    Impression & Plan        CMS Diagnoses:  None       Medical Decision Making:  Justus Lozano is a 66-year-old female presenting to the emergency department constipation.  She really has not had much stool since she slipped and fell sustaining a right wrist fracture and compression fracture.  She has been on pain medication did have surgery to repair these injuries.  Her physical exam showed a soft abdomen with hypoactive bowel sounds.  Abdominal x-ray was performed that did not show any concerning air-fluid levels.  She had no focal tenderness to be concerned for a surgical abdomen.  After reviewing her x-ray and seen significant stool we gave her relist being that she has had success with this in the past.  She has been on regular opiates and certainly opiate-induced constipation was high in the differential.  This was weight-based dosing.  While she did have increased intestinal movement she did not have any bowel movement.  A digital rectal exam furthermore showed normal rectal tone and no significant stool in the rectal vault.  Considered more ominous causes of constipation given her back injury such as cauda equina but physical exam was not consistent for this.    I then did try an enema.  There certainly is significant stool on the x-ray.  However enema just gave a small amount of diarrhea without any significant output.  At that point I did discuss with her doing a CT.  The purpose of which was to look for any more significant cause of her constipation due to the minimal success with  the interventions.  She has a normal creatinine.  A CAT scan does show significant constipation.  Fortunately there is no evidence of volvulus, obstruction, inflammation, infection.  I discussed observation versus trying to be more aggressive with symptomatic cares at home and stopping her oxycodone.  The patient at this point would like to try doing this at home.  I will have her increase her MiraLAX and stool softener dosing.  I did write her for magnesium citrate bottle which she was provided here.  She was specifically instructed to return to the emergency department if she develops increased abdominal pain, vomiting, or is unable to have a bowel movement in the next 24 hours.      Diagnosis:    ICD-10-CM    1. Constipation, unspecified constipation type  K59.00    2. Pancreatic cyst  K86.2    3. Renal cyst  N28.1    4. Compression fracture of T11 vertebra, initial encounter (H)  S22.080A        Discharge Medications:  Discharge Medication List as of 2/21/2022  6:04 PM            Scribe Disclosure:  I, Yudy Chung MD, am serving as a scribe at 6:23 PM on 2/23/2022 to document services personally performed by No att. providers found based on my observations and the provider's statements to me.      Yudy Chung MD  02/23/22 [id number]

## 2022-04-07 ENCOUNTER — TRANSFERRED RECORDS (OUTPATIENT)
Dept: HEALTH INFORMATION MANAGEMENT | Facility: CLINIC | Age: 67
End: 2022-04-07

## 2022-04-20 ENCOUNTER — TRANSFERRED RECORDS (OUTPATIENT)
Dept: HEALTH INFORMATION MANAGEMENT | Facility: CLINIC | Age: 67
End: 2022-04-20
Payer: COMMERCIAL

## 2022-04-20 LAB
CREATININE (EXTERNAL): 0.56 MG/DL (ref 0.57–1)
GFR ESTIMATED (EXTERNAL): 99 ML/MIN/1.7
GLUCOSE (EXTERNAL): 70 MG/DL (ref 65–99)
POTASSIUM (EXTERNAL): 4.5 MMOL/L (ref 3.5–5.2)

## 2022-04-27 ENCOUNTER — TRANSFERRED RECORDS (OUTPATIENT)
Dept: HEALTH INFORMATION MANAGEMENT | Facility: CLINIC | Age: 67
End: 2022-04-27
Payer: COMMERCIAL

## 2022-05-09 ENCOUNTER — MEDICAL CORRESPONDENCE (OUTPATIENT)
Dept: HEALTH INFORMATION MANAGEMENT | Facility: CLINIC | Age: 67
End: 2022-05-09
Payer: MEDICARE

## 2022-05-16 DIAGNOSIS — M81.0 SENILE OSTEOPOROSIS: ICD-10-CM

## 2022-05-16 RX ORDER — EPINEPHRINE 1 MG/ML
0.3 INJECTION, SOLUTION INTRAMUSCULAR; SUBCUTANEOUS EVERY 5 MIN PRN
Status: CANCELLED | OUTPATIENT
Start: 2022-06-06

## 2022-05-16 RX ORDER — NALOXONE HYDROCHLORIDE 0.4 MG/ML
0.2 INJECTION, SOLUTION INTRAMUSCULAR; INTRAVENOUS; SUBCUTANEOUS
Status: CANCELLED | OUTPATIENT
Start: 2022-06-06

## 2022-05-16 RX ORDER — METHYLPREDNISOLONE SODIUM SUCCINATE 125 MG/2ML
125 INJECTION, POWDER, LYOPHILIZED, FOR SOLUTION INTRAMUSCULAR; INTRAVENOUS
Status: CANCELLED
Start: 2022-06-06

## 2022-05-16 RX ORDER — MEPERIDINE HYDROCHLORIDE 25 MG/ML
25 INJECTION INTRAMUSCULAR; INTRAVENOUS; SUBCUTANEOUS EVERY 30 MIN PRN
Status: CANCELLED | OUTPATIENT
Start: 2022-06-06

## 2022-05-16 RX ORDER — DIPHENHYDRAMINE HYDROCHLORIDE 50 MG/ML
50 INJECTION INTRAMUSCULAR; INTRAVENOUS
Status: CANCELLED
Start: 2022-06-06

## 2022-05-16 RX ORDER — ALBUTEROL SULFATE 90 UG/1
1-2 AEROSOL, METERED RESPIRATORY (INHALATION)
Status: CANCELLED
Start: 2022-06-06

## 2022-05-16 RX ORDER — ALBUTEROL SULFATE 0.83 MG/ML
2.5 SOLUTION RESPIRATORY (INHALATION)
Status: CANCELLED | OUTPATIENT
Start: 2022-06-06

## 2022-06-06 ENCOUNTER — ALLIED HEALTH/NURSE VISIT (OUTPATIENT)
Dept: INFUSION THERAPY | Facility: CLINIC | Age: 67
End: 2022-06-06
Attending: INTERNAL MEDICINE
Payer: MEDICARE

## 2022-06-06 VITALS
RESPIRATION RATE: 16 BRPM | SYSTOLIC BLOOD PRESSURE: 137 MMHG | DIASTOLIC BLOOD PRESSURE: 82 MMHG | HEART RATE: 86 BPM | TEMPERATURE: 98.3 F | OXYGEN SATURATION: 97 %

## 2022-06-06 DIAGNOSIS — M81.0 SENILE OSTEOPOROSIS: Primary | ICD-10-CM

## 2022-06-06 PROCEDURE — 96372 THER/PROPH/DIAG INJ SC/IM: CPT | Performed by: SPECIALIST

## 2022-06-06 PROCEDURE — 250N000011 HC RX IP 250 OP 636: Performed by: SPECIALIST

## 2022-06-06 RX ORDER — DIPHENHYDRAMINE HYDROCHLORIDE 50 MG/ML
50 INJECTION INTRAMUSCULAR; INTRAVENOUS
Status: CANCELLED
Start: 2022-07-04

## 2022-06-06 RX ORDER — MEPERIDINE HYDROCHLORIDE 25 MG/ML
25 INJECTION INTRAMUSCULAR; INTRAVENOUS; SUBCUTANEOUS EVERY 30 MIN PRN
Status: CANCELLED | OUTPATIENT
Start: 2022-07-04

## 2022-06-06 RX ORDER — NALOXONE HYDROCHLORIDE 0.4 MG/ML
0.2 INJECTION, SOLUTION INTRAMUSCULAR; INTRAVENOUS; SUBCUTANEOUS
Status: CANCELLED | OUTPATIENT
Start: 2022-07-04

## 2022-06-06 RX ORDER — METHYLPREDNISOLONE SODIUM SUCCINATE 125 MG/2ML
125 INJECTION, POWDER, LYOPHILIZED, FOR SOLUTION INTRAMUSCULAR; INTRAVENOUS
Status: CANCELLED
Start: 2022-07-04

## 2022-06-06 RX ORDER — EPINEPHRINE 1 MG/ML
0.3 INJECTION, SOLUTION INTRAMUSCULAR; SUBCUTANEOUS EVERY 5 MIN PRN
Status: CANCELLED | OUTPATIENT
Start: 2022-07-04

## 2022-06-06 RX ORDER — ALBUTEROL SULFATE 90 UG/1
1-2 AEROSOL, METERED RESPIRATORY (INHALATION)
Status: CANCELLED
Start: 2022-07-04

## 2022-06-06 RX ORDER — ALBUTEROL SULFATE 0.83 MG/ML
2.5 SOLUTION RESPIRATORY (INHALATION)
Status: CANCELLED | OUTPATIENT
Start: 2022-07-04

## 2022-06-06 RX ADMIN — ROMOSOZUMAB-AQQG 210 MG: 105 INJECTION, SOLUTION SUBCUTANEOUS at 12:14

## 2022-06-06 ASSESSMENT — PAIN SCALES - GENERAL: PAINLEVEL: MILD PAIN (2)

## 2022-06-06 NOTE — PROGRESS NOTES
Infusion Nursing Note:  Justus Lozano presents today for evenity.    Patient seen by provider today: No   present during visit today: Not Applicable.    Note: First dose of evenity today. Reviewed administration process and possible side effects with patient, given printed info from CareNotes on Zootcardedex. Patient has ongoing mild back and muscle pain s/p thoracic fracture and surgery in February. No other new concerns today. Patient denies MI or stroke within the last year, and denies any new or unusual thigh, hip or groin pain.      Intravenous Access:  No Intravenous access/labs at this visit.    Treatment Conditions:  Reviewed outside results reports from 4/20/22: Calcium 9.7, Creat 0.56.  Results reviewed, labs MET treatment parameters, ok to proceed with treatment.      Post Infusion Assessment:  Patient tolerated injection without incident.  Site patent and intact, free from redness, edema or discomfort.       Discharge Plan:   AVS to patient via MYCHART.  Patient will return 7/6/22 for next appointment.   Patient discharged in stable condition accompanied by: self.  Departure Mode: Ambulatory.      Andree Pineda RN

## 2022-06-08 ENCOUNTER — TELEPHONE (OUTPATIENT)
Dept: ONCOLOGY | Facility: CLINIC | Age: 67
End: 2022-06-08
Payer: MEDICARE

## 2022-06-08 NOTE — TELEPHONE ENCOUNTER
Patient called triage line to report red, sore areas on both arms since receiving Evenity on 6/6/22. On Monday the sore/red/lumpy area was about the size of a golf ball. Now the area goes down to around her elbows. The right side is worse than the left.     Denies shortness of breath, difficulty breathing, swelling in lips/tongue/mouth, and chest pain.     Patient states that the ordering provider for the Evenity was Dr. Massey. Advised patient to reach out to Dr. Massey's office to inform them of her symptoms and seek their recommendations. Informed her that she could try ice packs at the sites.    Sydney Wheeler, RN  Triage Nurse Advisor  [facility]

## 2022-07-06 ENCOUNTER — INFUSION THERAPY VISIT (OUTPATIENT)
Dept: INFUSION THERAPY | Facility: CLINIC | Age: 67
End: 2022-07-06
Attending: SPECIALIST
Payer: MEDICARE

## 2022-07-06 VITALS
OXYGEN SATURATION: 98 % | SYSTOLIC BLOOD PRESSURE: 142 MMHG | DIASTOLIC BLOOD PRESSURE: 85 MMHG | TEMPERATURE: 98.5 F | RESPIRATION RATE: 18 BRPM | HEART RATE: 78 BPM

## 2022-07-06 DIAGNOSIS — M81.0 SENILE OSTEOPOROSIS: Primary | ICD-10-CM

## 2022-07-06 PROCEDURE — 96372 THER/PROPH/DIAG INJ SC/IM: CPT | Performed by: SPECIALIST

## 2022-07-06 PROCEDURE — 250N000011 HC RX IP 250 OP 636: Performed by: SPECIALIST

## 2022-07-06 RX ORDER — METHYLPREDNISOLONE SODIUM SUCCINATE 125 MG/2ML
125 INJECTION, POWDER, LYOPHILIZED, FOR SOLUTION INTRAMUSCULAR; INTRAVENOUS
Status: CANCELLED
Start: 2022-08-01

## 2022-07-06 RX ORDER — EPINEPHRINE 1 MG/ML
0.3 INJECTION, SOLUTION INTRAMUSCULAR; SUBCUTANEOUS EVERY 5 MIN PRN
Status: CANCELLED | OUTPATIENT
Start: 2022-08-01

## 2022-07-06 RX ORDER — MEPERIDINE HYDROCHLORIDE 25 MG/ML
25 INJECTION INTRAMUSCULAR; INTRAVENOUS; SUBCUTANEOUS EVERY 30 MIN PRN
Status: CANCELLED | OUTPATIENT
Start: 2022-08-01

## 2022-07-06 RX ORDER — ALBUTEROL SULFATE 90 UG/1
1-2 AEROSOL, METERED RESPIRATORY (INHALATION)
Status: CANCELLED
Start: 2022-08-01

## 2022-07-06 RX ORDER — NALOXONE HYDROCHLORIDE 0.4 MG/ML
0.2 INJECTION, SOLUTION INTRAMUSCULAR; INTRAVENOUS; SUBCUTANEOUS
Status: CANCELLED | OUTPATIENT
Start: 2022-08-01

## 2022-07-06 RX ORDER — DIPHENHYDRAMINE HYDROCHLORIDE 50 MG/ML
50 INJECTION INTRAMUSCULAR; INTRAVENOUS
Status: CANCELLED
Start: 2022-08-01

## 2022-07-06 RX ORDER — ALBUTEROL SULFATE 0.83 MG/ML
2.5 SOLUTION RESPIRATORY (INHALATION)
Status: CANCELLED | OUTPATIENT
Start: 2022-08-01

## 2022-07-06 RX ADMIN — ROMOSOZUMAB-AQQG 210 MG: 105 INJECTION, SOLUTION SUBCUTANEOUS at 12:37

## 2022-07-06 NOTE — PROGRESS NOTES
Infusion Nursing Note:  Justus Lozano presents today for Evenity Injection.    Patient seen by provider today: No   present during visit today: Not Applicable.    Note: Last injection of Evenity, pt was concerned it didn't get into her fat tissue since both arms were red and swollen. Pt requested it be injected in abdominal tissue.    Intravenous Access:  No Intravenous access/labs at this visit.    Treatment Conditions:  Lab Results   Component Value Date     02/16/2022    POTASSIUM 4.0 02/16/2022    CR 0.4 (L) 02/21/2022    HANDY 8.5 02/16/2022    BILITOTAL 0.5 09/14/2016    ALBUMIN 3.4 09/14/2016    ALT 18 09/14/2016    AST 13 09/14/2016     Results reviewed, labs MET treatment parameters, ok to proceed with treatment.    Post Infusion Assessment:  Patient tolerated injection without incident.  Site patent and intact, free from redness, edema or discomfort.  No evidence of extravasations.     Discharge Plan:   Patient declined prescription refills.  Discharge instructions reviewed with: Patient.  Patient and/or family verbalized understanding of discharge instructions and all questions answered.  AVS to patient via beModelT.  Patient will return 8/3 for next appointment.   Patient discharged in stable condition accompanied by: self.  Departure Mode: Ambulatory.      Medardo Reed RN

## 2022-08-03 ENCOUNTER — INFUSION THERAPY VISIT (OUTPATIENT)
Dept: INFUSION THERAPY | Facility: CLINIC | Age: 67
End: 2022-08-03
Attending: SPECIALIST
Payer: MEDICARE

## 2022-08-03 VITALS
TEMPERATURE: 98.1 F | OXYGEN SATURATION: 97 % | HEART RATE: 75 BPM | DIASTOLIC BLOOD PRESSURE: 80 MMHG | SYSTOLIC BLOOD PRESSURE: 136 MMHG | RESPIRATION RATE: 16 BRPM

## 2022-08-03 DIAGNOSIS — M81.0 SENILE OSTEOPOROSIS: Primary | ICD-10-CM

## 2022-08-03 PROCEDURE — 96372 THER/PROPH/DIAG INJ SC/IM: CPT | Performed by: SPECIALIST

## 2022-08-03 PROCEDURE — 250N000011 HC RX IP 250 OP 636: Performed by: SPECIALIST

## 2022-08-03 RX ORDER — DIPHENHYDRAMINE HYDROCHLORIDE 50 MG/ML
50 INJECTION INTRAMUSCULAR; INTRAVENOUS
Status: CANCELLED
Start: 2022-08-31

## 2022-08-03 RX ORDER — ALBUTEROL SULFATE 90 UG/1
1-2 AEROSOL, METERED RESPIRATORY (INHALATION)
Status: CANCELLED
Start: 2022-08-31

## 2022-08-03 RX ORDER — EPINEPHRINE 1 MG/ML
0.3 INJECTION, SOLUTION INTRAMUSCULAR; SUBCUTANEOUS EVERY 5 MIN PRN
Status: CANCELLED | OUTPATIENT
Start: 2022-08-31

## 2022-08-03 RX ORDER — METHYLPREDNISOLONE SODIUM SUCCINATE 125 MG/2ML
125 INJECTION, POWDER, LYOPHILIZED, FOR SOLUTION INTRAMUSCULAR; INTRAVENOUS
Status: CANCELLED
Start: 2022-08-31

## 2022-08-03 RX ORDER — MEPERIDINE HYDROCHLORIDE 25 MG/ML
25 INJECTION INTRAMUSCULAR; INTRAVENOUS; SUBCUTANEOUS EVERY 30 MIN PRN
Status: CANCELLED | OUTPATIENT
Start: 2022-08-31

## 2022-08-03 RX ORDER — ALBUTEROL SULFATE 0.83 MG/ML
2.5 SOLUTION RESPIRATORY (INHALATION)
Status: CANCELLED | OUTPATIENT
Start: 2022-08-31

## 2022-08-03 RX ORDER — NALOXONE HYDROCHLORIDE 0.4 MG/ML
0.2 INJECTION, SOLUTION INTRAMUSCULAR; INTRAVENOUS; SUBCUTANEOUS
Status: CANCELLED | OUTPATIENT
Start: 2022-08-31

## 2022-08-03 RX ADMIN — ROMOSOZUMAB-AQQG 210 MG: 105 INJECTION, SOLUTION SUBCUTANEOUS at 12:39

## 2022-08-03 ASSESSMENT — PAIN SCALES - GENERAL: PAINLEVEL: NO PAIN (0)

## 2022-08-03 NOTE — PROGRESS NOTES
Infusion Nursing Note:  Justus Lozano presents today for Evenity.    Patient seen by provider today: No   present during visit today: Not Applicable.    Note: N/A.    Intravenous Access:  No Intravenous access/labs at this visit.    Treatment Conditions:  Lab Results   Component Value Date     02/16/2022    POTASSIUM 4.0 02/16/2022    CR 0.4 (L) 02/21/2022    HANDY 8.5 02/16/2022    BILITOTAL 0.5 09/14/2016    ALBUMIN 3.4 09/14/2016    ALT 18 09/14/2016    AST 13 09/14/2016     Results reviewed, labs MET treatment parameters, ok to proceed with treatment.    Post Infusion Assessment:  Patient tolerated injection without incident.     Discharge Plan:   Patient and/or family verbalized understanding of discharge instructions and all questions answered.  AVS to patient via UGOBET.  Patient will return 8/31 for next appointment.   Patient discharged in stable condition accompanied by: self.  Departure Mode: Ambulatory.      Xochitl Hartmann RN

## 2022-08-31 ENCOUNTER — INFUSION THERAPY VISIT (OUTPATIENT)
Dept: INFUSION THERAPY | Facility: CLINIC | Age: 67
End: 2022-08-31
Attending: SPECIALIST
Payer: MEDICARE

## 2022-08-31 VITALS
HEART RATE: 77 BPM | DIASTOLIC BLOOD PRESSURE: 76 MMHG | RESPIRATION RATE: 18 BRPM | OXYGEN SATURATION: 98 % | SYSTOLIC BLOOD PRESSURE: 148 MMHG | TEMPERATURE: 98.6 F

## 2022-08-31 DIAGNOSIS — M81.0 SENILE OSTEOPOROSIS: Primary | ICD-10-CM

## 2022-08-31 PROCEDURE — 250N000011 HC RX IP 250 OP 636: Performed by: SPECIALIST

## 2022-08-31 PROCEDURE — 96372 THER/PROPH/DIAG INJ SC/IM: CPT | Performed by: SPECIALIST

## 2022-08-31 RX ORDER — DIPHENHYDRAMINE HYDROCHLORIDE 50 MG/ML
50 INJECTION INTRAMUSCULAR; INTRAVENOUS
Status: CANCELLED
Start: 2022-09-28

## 2022-08-31 RX ORDER — EPINEPHRINE 1 MG/ML
0.3 INJECTION, SOLUTION INTRAMUSCULAR; SUBCUTANEOUS EVERY 5 MIN PRN
Status: CANCELLED | OUTPATIENT
Start: 2022-09-28

## 2022-08-31 RX ORDER — MEPERIDINE HYDROCHLORIDE 25 MG/ML
25 INJECTION INTRAMUSCULAR; INTRAVENOUS; SUBCUTANEOUS EVERY 30 MIN PRN
Status: CANCELLED | OUTPATIENT
Start: 2022-09-28

## 2022-08-31 RX ORDER — METHYLPREDNISOLONE SODIUM SUCCINATE 125 MG/2ML
125 INJECTION, POWDER, LYOPHILIZED, FOR SOLUTION INTRAMUSCULAR; INTRAVENOUS
Status: CANCELLED
Start: 2022-09-28

## 2022-08-31 RX ORDER — NALOXONE HYDROCHLORIDE 0.4 MG/ML
0.2 INJECTION, SOLUTION INTRAMUSCULAR; INTRAVENOUS; SUBCUTANEOUS
Status: CANCELLED | OUTPATIENT
Start: 2022-09-28

## 2022-08-31 RX ORDER — ALBUTEROL SULFATE 0.83 MG/ML
2.5 SOLUTION RESPIRATORY (INHALATION)
Status: CANCELLED | OUTPATIENT
Start: 2022-09-28

## 2022-08-31 RX ORDER — ALBUTEROL SULFATE 90 UG/1
1-2 AEROSOL, METERED RESPIRATORY (INHALATION)
Status: CANCELLED
Start: 2022-09-28

## 2022-08-31 RX ADMIN — ROMOSOZUMAB-AQQG 210 MG: 105 INJECTION, SOLUTION SUBCUTANEOUS at 12:42

## 2022-08-31 NOTE — PROGRESS NOTES
Infusion Nursing Note:  Justus Lozano presents today for Evenity Injection.    Patient seen by provider today: No   present during visit today: Not Applicable.    Note: N/A.    Intravenous Access:  No Intravenous access/labs at this visit.    Treatment Conditions:  Lab Results   Component Value Date     02/16/2022    POTASSIUM 4.0 02/16/2022    CR 0.4 (L) 02/21/2022    HANDY 8.5 02/16/2022    BILITOTAL 0.5 09/14/2016    ALBUMIN 3.4 09/14/2016    ALT 18 09/14/2016    AST 13 09/14/2016     Results reviewed, labs MET treatment parameters, ok to proceed with treatment.    Post Infusion Assessment:  Patient tolerated injection without incident.  Site patent and intact, free from redness, edema or discomfort.  No evidence of extravasations.     Discharge Plan:   Patient declined prescription refills.  Discharge instructions reviewed with: Patient.  Patient and/or family verbalized understanding of discharge instructions and all questions answered.  AVS to patient via FincoT.  Patient will return 9/28 for next appointment.   Patient discharged in stable condition accompanied by: self.  Departure Mode: Ambulatory.      Medardo Reed RN

## 2022-09-28 ENCOUNTER — INFUSION THERAPY VISIT (OUTPATIENT)
Dept: INFUSION THERAPY | Facility: CLINIC | Age: 67
End: 2022-09-28
Attending: SPECIALIST
Payer: MEDICARE

## 2022-09-28 DIAGNOSIS — M81.0 SENILE OSTEOPOROSIS: Primary | ICD-10-CM

## 2022-09-28 LAB
CALCIUM SERPL-MCNC: 9.3 MG/DL (ref 8.5–10.1)
CREAT SERPL-MCNC: 0.49 MG/DL (ref 0.52–1.25)
GFR SERPL CREATININE-BSD FRML MDRD: >90 ML/MIN/1.73M2

## 2022-09-28 PROCEDURE — 82310 ASSAY OF CALCIUM: CPT | Performed by: SPECIALIST

## 2022-09-28 PROCEDURE — 96372 THER/PROPH/DIAG INJ SC/IM: CPT | Performed by: SPECIALIST

## 2022-09-28 PROCEDURE — 250N000011 HC RX IP 250 OP 636: Performed by: SPECIALIST

## 2022-09-28 PROCEDURE — 36415 COLL VENOUS BLD VENIPUNCTURE: CPT | Performed by: SPECIALIST

## 2022-09-28 PROCEDURE — 82565 ASSAY OF CREATININE: CPT | Performed by: SPECIALIST

## 2022-09-28 RX ORDER — ALBUTEROL SULFATE 90 UG/1
1-2 AEROSOL, METERED RESPIRATORY (INHALATION)
Status: CANCELLED
Start: 2022-10-26

## 2022-09-28 RX ORDER — NALOXONE HYDROCHLORIDE 0.4 MG/ML
0.2 INJECTION, SOLUTION INTRAMUSCULAR; INTRAVENOUS; SUBCUTANEOUS
Status: CANCELLED | OUTPATIENT
Start: 2022-10-26

## 2022-09-28 RX ORDER — MEPERIDINE HYDROCHLORIDE 25 MG/ML
25 INJECTION INTRAMUSCULAR; INTRAVENOUS; SUBCUTANEOUS EVERY 30 MIN PRN
Status: CANCELLED | OUTPATIENT
Start: 2022-10-26

## 2022-09-28 RX ORDER — DIPHENHYDRAMINE HYDROCHLORIDE 50 MG/ML
50 INJECTION INTRAMUSCULAR; INTRAVENOUS
Status: CANCELLED
Start: 2022-10-26

## 2022-09-28 RX ORDER — EPINEPHRINE 1 MG/ML
0.3 INJECTION, SOLUTION INTRAMUSCULAR; SUBCUTANEOUS EVERY 5 MIN PRN
Status: CANCELLED | OUTPATIENT
Start: 2022-10-26

## 2022-09-28 RX ORDER — ALBUTEROL SULFATE 0.83 MG/ML
2.5 SOLUTION RESPIRATORY (INHALATION)
Status: CANCELLED | OUTPATIENT
Start: 2022-10-26

## 2022-09-28 RX ORDER — METHYLPREDNISOLONE SODIUM SUCCINATE 125 MG/2ML
125 INJECTION, POWDER, LYOPHILIZED, FOR SOLUTION INTRAMUSCULAR; INTRAVENOUS
Status: CANCELLED
Start: 2022-10-26

## 2022-09-28 RX ADMIN — ROMOSOZUMAB-AQQG 210 MG: 105 INJECTION, SOLUTION SUBCUTANEOUS at 12:55

## 2022-09-29 VITALS
SYSTOLIC BLOOD PRESSURE: 153 MMHG | TEMPERATURE: 98.3 F | DIASTOLIC BLOOD PRESSURE: 78 MMHG | HEART RATE: 76 BPM | RESPIRATION RATE: 16 BRPM

## 2022-10-22 ENCOUNTER — HEALTH MAINTENANCE LETTER (OUTPATIENT)
Age: 67
End: 2022-10-22

## 2022-10-26 ENCOUNTER — INFUSION THERAPY VISIT (OUTPATIENT)
Dept: INFUSION THERAPY | Facility: CLINIC | Age: 67
End: 2022-10-26
Attending: SPECIALIST
Payer: MEDICARE

## 2022-10-26 VITALS
HEART RATE: 71 BPM | SYSTOLIC BLOOD PRESSURE: 132 MMHG | DIASTOLIC BLOOD PRESSURE: 76 MMHG | RESPIRATION RATE: 20 BRPM | OXYGEN SATURATION: 97 % | TEMPERATURE: 98.7 F

## 2022-10-26 DIAGNOSIS — M81.0 SENILE OSTEOPOROSIS: Primary | ICD-10-CM

## 2022-10-26 PROCEDURE — 250N000011 HC RX IP 250 OP 636: Performed by: SPECIALIST

## 2022-10-26 PROCEDURE — 96372 THER/PROPH/DIAG INJ SC/IM: CPT | Performed by: SPECIALIST

## 2022-10-26 RX ORDER — NALOXONE HYDROCHLORIDE 0.4 MG/ML
0.2 INJECTION, SOLUTION INTRAMUSCULAR; INTRAVENOUS; SUBCUTANEOUS
Status: CANCELLED | OUTPATIENT
Start: 2022-11-23

## 2022-10-26 RX ORDER — ALBUTEROL SULFATE 90 UG/1
1-2 AEROSOL, METERED RESPIRATORY (INHALATION)
Status: CANCELLED
Start: 2022-11-23

## 2022-10-26 RX ORDER — MEPERIDINE HYDROCHLORIDE 25 MG/ML
25 INJECTION INTRAMUSCULAR; INTRAVENOUS; SUBCUTANEOUS EVERY 30 MIN PRN
Status: CANCELLED | OUTPATIENT
Start: 2022-11-23

## 2022-10-26 RX ORDER — EPINEPHRINE 1 MG/ML
0.3 INJECTION, SOLUTION INTRAMUSCULAR; SUBCUTANEOUS EVERY 5 MIN PRN
Status: CANCELLED | OUTPATIENT
Start: 2022-11-23

## 2022-10-26 RX ORDER — METHYLPREDNISOLONE SODIUM SUCCINATE 125 MG/2ML
125 INJECTION, POWDER, LYOPHILIZED, FOR SOLUTION INTRAMUSCULAR; INTRAVENOUS
Status: CANCELLED
Start: 2022-11-23

## 2022-10-26 RX ORDER — DIPHENHYDRAMINE HYDROCHLORIDE 50 MG/ML
50 INJECTION INTRAMUSCULAR; INTRAVENOUS
Status: CANCELLED
Start: 2022-11-23

## 2022-10-26 RX ORDER — ALBUTEROL SULFATE 0.83 MG/ML
2.5 SOLUTION RESPIRATORY (INHALATION)
Status: CANCELLED | OUTPATIENT
Start: 2022-11-23

## 2022-10-26 RX ADMIN — ROMOSOZUMAB-AQQG 210 MG: 105 INJECTION, SOLUTION SUBCUTANEOUS at 13:32

## 2022-10-26 ASSESSMENT — PAIN SCALES - GENERAL: PAINLEVEL: NO PAIN (0)

## 2022-10-26 NOTE — PROGRESS NOTES
Infusion Nursing Note:  Justusmiles Lozano presents today for evenity.    Patient seen by provider today: No   present during visit today: Not Applicable.    Note: N/A.    Intravenous Access:  No Intravenous access/labs at this visit.    Treatment Conditions:  Not Applicable.    Post Infusion Assessment:  Patient tolerated injection without incident.  Site patent and intact, free from redness, edema or discomfort.     Discharge Plan:   AVS to patient via MYCHART.  Patient will return as prev dmitriy for next appointment.   Patient discharged in stable condition accompanied by: self.  Departure Mode: Ambulatory.      Sergio Yanes RN

## 2022-11-01 NOTE — PROGRESS NOTES
Infusion Nursing Note:  [facility]    Patient seen by provider today: No   present during visit today: Not Applicable.     Note: N/A. Intake done per J CARLOS Guerra. [facility] one [facility], no need to wait to for results today.    Intravenous Access:  Lab draw site right AC, Needle type butterfly, Gauge 23.  Labs drawn without difficulty.    Treatment Conditions:  Not Applicable. Labs pending at time of discharge.    Post Infusion Assessment:  Patient tolerated injection without incident.  Site patent and intact, free from redness, edema or discomfort.  No evidence of extravasations.  Access discontinued per protocol.     Discharge Plan:   Discharge instructions reviewed with: Patient.  Patient and/or family verbalized understanding of discharge instructions and all questions answered.  AVS to patient via Pixable.  Patient will return 10/26/22 for next appointment.   Patient discharged in stable condition accompanied by: self.  Departure Mode: Ambulatory.      Andree Pineda RN      
n/a

## 2022-11-30 ENCOUNTER — INFUSION THERAPY VISIT (OUTPATIENT)
Dept: INFUSION THERAPY | Facility: CLINIC | Age: 67
End: 2022-11-30
Attending: SPECIALIST
Payer: MEDICARE

## 2022-11-30 VITALS
TEMPERATURE: 98.8 F | SYSTOLIC BLOOD PRESSURE: 145 MMHG | DIASTOLIC BLOOD PRESSURE: 74 MMHG | HEART RATE: 76 BPM | RESPIRATION RATE: 16 BRPM

## 2022-11-30 DIAGNOSIS — M81.0 SENILE OSTEOPOROSIS: Primary | ICD-10-CM

## 2022-11-30 PROCEDURE — 250N000011 HC RX IP 250 OP 636: Performed by: SPECIALIST

## 2022-11-30 PROCEDURE — 96372 THER/PROPH/DIAG INJ SC/IM: CPT | Performed by: SPECIALIST

## 2022-11-30 RX ORDER — METHYLPREDNISOLONE SODIUM SUCCINATE 125 MG/2ML
125 INJECTION, POWDER, LYOPHILIZED, FOR SOLUTION INTRAMUSCULAR; INTRAVENOUS
Status: CANCELLED
Start: 2022-12-21

## 2022-11-30 RX ORDER — ALBUTEROL SULFATE 90 UG/1
1-2 AEROSOL, METERED RESPIRATORY (INHALATION)
Status: CANCELLED
Start: 2022-12-21

## 2022-11-30 RX ORDER — NALOXONE HYDROCHLORIDE 0.4 MG/ML
0.2 INJECTION, SOLUTION INTRAMUSCULAR; INTRAVENOUS; SUBCUTANEOUS
Status: CANCELLED | OUTPATIENT
Start: 2022-12-21

## 2022-11-30 RX ORDER — EPINEPHRINE 1 MG/ML
0.3 INJECTION, SOLUTION INTRAMUSCULAR; SUBCUTANEOUS EVERY 5 MIN PRN
Status: CANCELLED | OUTPATIENT
Start: 2022-12-21

## 2022-11-30 RX ORDER — ALBUTEROL SULFATE 0.83 MG/ML
2.5 SOLUTION RESPIRATORY (INHALATION)
Status: CANCELLED | OUTPATIENT
Start: 2022-12-21

## 2022-11-30 RX ORDER — DIPHENHYDRAMINE HYDROCHLORIDE 50 MG/ML
50 INJECTION INTRAMUSCULAR; INTRAVENOUS
Status: CANCELLED
Start: 2022-12-21

## 2022-11-30 RX ORDER — MEPERIDINE HYDROCHLORIDE 25 MG/ML
25 INJECTION INTRAMUSCULAR; INTRAVENOUS; SUBCUTANEOUS EVERY 30 MIN PRN
Status: CANCELLED | OUTPATIENT
Start: 2022-12-21

## 2022-11-30 RX ADMIN — ROMOSOZUMAB-AQQG 210 MG: 105 INJECTION, SOLUTION SUBCUTANEOUS at 13:25

## 2023-01-04 ENCOUNTER — INFUSION THERAPY VISIT (OUTPATIENT)
Dept: INFUSION THERAPY | Facility: CLINIC | Age: 68
End: 2023-01-04
Attending: FAMILY MEDICINE
Payer: MEDICARE

## 2023-01-04 VITALS
HEART RATE: 67 BPM | SYSTOLIC BLOOD PRESSURE: 157 MMHG | DIASTOLIC BLOOD PRESSURE: 81 MMHG | RESPIRATION RATE: 18 BRPM | TEMPERATURE: 98.1 F

## 2023-01-04 DIAGNOSIS — M81.0 SENILE OSTEOPOROSIS: Primary | ICD-10-CM

## 2023-01-04 PROCEDURE — 96372 THER/PROPH/DIAG INJ SC/IM: CPT | Performed by: SPECIALIST

## 2023-01-04 PROCEDURE — 250N000011 HC RX IP 250 OP 636: Performed by: SPECIALIST

## 2023-01-04 RX ORDER — EPINEPHRINE 1 MG/ML
0.3 INJECTION, SOLUTION INTRAMUSCULAR; SUBCUTANEOUS EVERY 5 MIN PRN
Status: CANCELLED | OUTPATIENT
Start: 2023-01-25

## 2023-01-04 RX ORDER — MEPERIDINE HYDROCHLORIDE 25 MG/ML
25 INJECTION INTRAMUSCULAR; INTRAVENOUS; SUBCUTANEOUS EVERY 30 MIN PRN
Status: CANCELLED | OUTPATIENT
Start: 2023-01-25

## 2023-01-04 RX ORDER — ALBUTEROL SULFATE 0.83 MG/ML
2.5 SOLUTION RESPIRATORY (INHALATION)
Status: CANCELLED | OUTPATIENT
Start: 2023-01-25

## 2023-01-04 RX ORDER — ALBUTEROL SULFATE 90 UG/1
1-2 AEROSOL, METERED RESPIRATORY (INHALATION)
Status: CANCELLED
Start: 2023-01-25

## 2023-01-04 RX ORDER — METHYLPREDNISOLONE SODIUM SUCCINATE 125 MG/2ML
125 INJECTION, POWDER, LYOPHILIZED, FOR SOLUTION INTRAMUSCULAR; INTRAVENOUS
Status: CANCELLED
Start: 2023-01-25

## 2023-01-04 RX ORDER — DIPHENHYDRAMINE HYDROCHLORIDE 50 MG/ML
50 INJECTION INTRAMUSCULAR; INTRAVENOUS
Status: CANCELLED
Start: 2023-01-25

## 2023-01-04 RX ORDER — NALOXONE HYDROCHLORIDE 0.4 MG/ML
0.2 INJECTION, SOLUTION INTRAMUSCULAR; INTRAVENOUS; SUBCUTANEOUS
Status: CANCELLED | OUTPATIENT
Start: 2023-01-25

## 2023-01-04 RX ADMIN — ROMOSOZUMAB-AQQG 210 MG: 105 INJECTION, SOLUTION SUBCUTANEOUS at 12:11

## 2023-01-04 NOTE — PROGRESS NOTES
Infusion Nursing Note:  Justus KUSH Lozano presents today for Evenity.    Patient seen by provider today: No   present during visit today: Not Applicable.    Note: N/A.    Intravenous Access:  No Intravenous access/labs at this visit.    Treatment Conditions:  Not Applicable.    Post Infusion Assessment:  Patient tolerated injection without incident.  Site patent and intact, free from redness, edema or discomfort.  No evidence of extravasations.     Discharge Plan:   AVS to patient via MYCHART.  Patient will return 2/6/23 for next appointment.   Patient discharged in stable condition accompanied by: self.  Departure Mode: Ambulatory.      Grupo Zhang RN

## 2023-02-06 ENCOUNTER — INFUSION THERAPY VISIT (OUTPATIENT)
Dept: INFUSION THERAPY | Facility: CLINIC | Age: 68
End: 2023-02-06
Attending: NURSE PRACTITIONER
Payer: MEDICARE

## 2023-02-06 VITALS
HEART RATE: 82 BPM | OXYGEN SATURATION: 98 % | BODY MASS INDEX: 19.8 KG/M2 | SYSTOLIC BLOOD PRESSURE: 131 MMHG | RESPIRATION RATE: 18 BRPM | TEMPERATURE: 97.9 F | DIASTOLIC BLOOD PRESSURE: 75 MMHG | WEIGHT: 138 LBS

## 2023-02-06 DIAGNOSIS — M81.0 SENILE OSTEOPOROSIS: Primary | ICD-10-CM

## 2023-02-06 PROCEDURE — 96372 THER/PROPH/DIAG INJ SC/IM: CPT | Performed by: SPECIALIST

## 2023-02-06 PROCEDURE — 250N000011 HC RX IP 250 OP 636: Performed by: SPECIALIST

## 2023-02-06 RX ORDER — DIPHENHYDRAMINE HYDROCHLORIDE 50 MG/ML
50 INJECTION INTRAMUSCULAR; INTRAVENOUS
Status: CANCELLED
Start: 2023-03-01

## 2023-02-06 RX ORDER — METHYLPREDNISOLONE SODIUM SUCCINATE 125 MG/2ML
125 INJECTION, POWDER, LYOPHILIZED, FOR SOLUTION INTRAMUSCULAR; INTRAVENOUS
Status: CANCELLED
Start: 2023-03-01

## 2023-02-06 RX ORDER — ALBUTEROL SULFATE 0.83 MG/ML
2.5 SOLUTION RESPIRATORY (INHALATION)
Status: CANCELLED | OUTPATIENT
Start: 2023-03-01

## 2023-02-06 RX ORDER — NALOXONE HYDROCHLORIDE 0.4 MG/ML
0.2 INJECTION, SOLUTION INTRAMUSCULAR; INTRAVENOUS; SUBCUTANEOUS
Status: CANCELLED | OUTPATIENT
Start: 2023-03-01

## 2023-02-06 RX ORDER — MEPERIDINE HYDROCHLORIDE 25 MG/ML
25 INJECTION INTRAMUSCULAR; INTRAVENOUS; SUBCUTANEOUS EVERY 30 MIN PRN
Status: CANCELLED | OUTPATIENT
Start: 2023-03-01

## 2023-02-06 RX ORDER — ALBUTEROL SULFATE 90 UG/1
1-2 AEROSOL, METERED RESPIRATORY (INHALATION)
Status: CANCELLED
Start: 2023-03-01

## 2023-02-06 RX ORDER — EPINEPHRINE 1 MG/ML
0.3 INJECTION, SOLUTION INTRAMUSCULAR; SUBCUTANEOUS EVERY 5 MIN PRN
Status: CANCELLED | OUTPATIENT
Start: 2023-03-01

## 2023-02-06 RX ADMIN — ROMOSOZUMAB-AQQG 210 MG: 105 INJECTION, SOLUTION SUBCUTANEOUS at 13:08

## 2023-02-06 NOTE — PROGRESS NOTES
Infusion Nursing Note:  Justus Lozano presents today for Evenity    Patient seen by provider today: No   present during visit today: Not Applicable.    Note: Pt is reporting no new concerns today. Pt denies history or Stroke or MI in the past year. Pt also denies having unusual thigh, hip, or groin pain.   Intravenous Access:  No Intravenous access/labs at this visit.    Treatment Conditions:  Pt denies history or Stroke or MI in the past year. Pt also denies having unusual thigh, hip, or groin pain.       Post Infusion Assessment:  Patient tolerated injection without incident.  Blood return noted pre and post infusion.  Site patent and intact, free from redness, edema or discomfort.  No evidence of extravasations.  Access discontinued per protocol.     Discharge Plan:   Patient declined prescription refills.  Discharge instructions reviewed with: Patient.  Patient and/or family verbalized understanding of discharge instructions and all questions answered.  AVS to patient via AdmitOne SecurityT. Patient will return for next appointment 3/8/23.   Patient discharged in stable condition accompanied by: self.  Departure Mode: Ambulatory.      Abeba Dubois RN

## 2023-02-27 ENCOUNTER — TELEPHONE (OUTPATIENT)
Dept: AUDIOLOGY | Facility: CLINIC | Age: 68
End: 2023-02-27

## 2023-02-27 NOTE — TELEPHONE ENCOUNTER
[facility] Call Center    Phone Message    May a detailed message be left on voicemail: yes     Reason for Call: Other: Pt called requesting to speak with Sandy Elizabeth regarding a cochlear implant evaluation she had done in May 2019. Pt stated there are different Medicare requirements now and she is wondering if she needs to be seen for another evaluation. Pt requests a call back. Please advise. Thank you!     Action Taken: Message routed to:  [facility] Audiology    Travel Screening: Not Applicable

## 2023-02-28 ENCOUNTER — TELEPHONE (OUTPATIENT)
Dept: AUDIOLOGY | Facility: CLINIC | Age: 68
End: 2023-02-28
Payer: MEDICARE

## 2023-02-28 NOTE — TELEPHONE ENCOUNTER
Spoke with patient regarding a message from the call center/advice from audiology regarding potential CI Evaluation appointment. Patient stated that they have been using a CROS hearing aid system for about 1 year and have had a recent hearing test (believes in the past 3 months) with Ling Shabazz at [facility].  Per patient, Is seeking audiologist's advice on whether repeating CI evaluation may leaver her a likely candidate since change in Medicare requirements, or if not, would prefer not to repeat CIE.    -Rubi Mascorro 2/28/23

## 2023-03-01 NOTE — TELEPHONE ENCOUNTER
Spoke with Justus sharing advice that she could schedule CIE appointment now and it will be kept according to the review of audiogram (requested by [facility]/ records 3/1) or cancelled if audiology thinks candidacy is unlikely, per pt. Request. Patient requested that she wished to continue care with Payton Mcallister     -Rubi Mascorro 3/1/23

## 2023-03-07 NOTE — TELEPHONE ENCOUNTER
FUTURE VISIT INFORMATION      FUTURE VISIT INFORMATION:    Date: 4/13/23    Time: 7:00am    Location: [facility]  REFERRAL INFORMATION:    Reason for visit/diagnosis  CIE    RECORDS REQUESTED FROM:       Clinic name Comments Records Status Imaging Status   [facility]  Requested samira march 3/14/23- [phone]- [facility] done 5/17/19- [facility]

## 2023-03-08 ENCOUNTER — ALLIED HEALTH/NURSE VISIT (OUTPATIENT)
Dept: INFUSION THERAPY | Facility: CLINIC | Age: 68
End: 2023-03-08
Attending: SPECIALIST
Payer: MEDICARE

## 2023-03-08 VITALS
RESPIRATION RATE: 16 BRPM | DIASTOLIC BLOOD PRESSURE: 77 MMHG | TEMPERATURE: 98 F | HEART RATE: 73 BPM | SYSTOLIC BLOOD PRESSURE: 139 MMHG

## 2023-03-08 DIAGNOSIS — M81.0 SENILE OSTEOPOROSIS: Primary | ICD-10-CM

## 2023-03-08 LAB
CALCIUM SERPL-MCNC: 9.3 MG/DL (ref 8.8–10.2)
CREAT SERPL-MCNC: 0.59 MG/DL (ref 0.51–1.17)
GFR SERPL CREATININE-BSD FRML MDRD: >90 ML/MIN/1.73M2

## 2023-03-08 PROCEDURE — 96372 THER/PROPH/DIAG INJ SC/IM: CPT | Performed by: SPECIALIST

## 2023-03-08 PROCEDURE — 82565 ASSAY OF CREATININE: CPT | Performed by: SPECIALIST

## 2023-03-08 PROCEDURE — 36415 COLL VENOUS BLD VENIPUNCTURE: CPT | Performed by: SPECIALIST

## 2023-03-08 PROCEDURE — 250N000011 HC RX IP 250 OP 636: Performed by: SPECIALIST

## 2023-03-08 PROCEDURE — 82310 ASSAY OF CALCIUM: CPT | Performed by: SPECIALIST

## 2023-03-08 RX ORDER — NALOXONE HYDROCHLORIDE 0.4 MG/ML
0.2 INJECTION, SOLUTION INTRAMUSCULAR; INTRAVENOUS; SUBCUTANEOUS
Status: CANCELLED | OUTPATIENT
Start: 2023-03-27

## 2023-03-08 RX ORDER — ALBUTEROL SULFATE 90 UG/1
1-2 AEROSOL, METERED RESPIRATORY (INHALATION)
Status: CANCELLED
Start: 2023-03-27

## 2023-03-08 RX ORDER — MEPERIDINE HYDROCHLORIDE 25 MG/ML
25 INJECTION INTRAMUSCULAR; INTRAVENOUS; SUBCUTANEOUS EVERY 30 MIN PRN
Status: CANCELLED | OUTPATIENT
Start: 2023-03-27

## 2023-03-08 RX ORDER — DIPHENHYDRAMINE HYDROCHLORIDE 50 MG/ML
50 INJECTION INTRAMUSCULAR; INTRAVENOUS
Status: CANCELLED
Start: 2023-03-27

## 2023-03-08 RX ORDER — ALBUTEROL SULFATE 0.83 MG/ML
2.5 SOLUTION RESPIRATORY (INHALATION)
Status: CANCELLED | OUTPATIENT
Start: 2023-03-27

## 2023-03-08 RX ORDER — METHYLPREDNISOLONE SODIUM SUCCINATE 125 MG/2ML
125 INJECTION, POWDER, LYOPHILIZED, FOR SOLUTION INTRAMUSCULAR; INTRAVENOUS
Status: CANCELLED
Start: 2023-03-27

## 2023-03-08 RX ORDER — EPINEPHRINE 1 MG/ML
0.3 INJECTION, SOLUTION INTRAMUSCULAR; SUBCUTANEOUS EVERY 5 MIN PRN
Status: CANCELLED | OUTPATIENT
Start: 2023-03-27

## 2023-03-08 RX ADMIN — ROMOSOZUMAB-AQQG 210 MG: 105 INJECTION, SOLUTION SUBCUTANEOUS at 12:52

## 2023-03-08 ASSESSMENT — PAIN SCALES - GENERAL: PAINLEVEL: NO PAIN (0)

## 2023-03-08 NOTE — PROGRESS NOTES
Infusion Nursing Note:  Justus Lozano presents today for labs and evenity.    Patient seen by provider today: No   present during visit today: Not Applicable.    Note: labs drawn today for next evenity appt.    Intravenous Access:  Lab draw site left AC, Needle type BF, Gauge 21.    Treatment Conditions:  Lab Results   Component Value Date     02/16/2022    POTASSIUM 4.0 02/16/2022    CR 0.49 (L) 09/28/2022    HANDY 9.3 09/28/2022    BILITOTAL 0.5 09/14/2016    ALBUMIN 3.4 09/14/2016    ALT 18 09/14/2016    AST 13 09/14/2016     Results reviewed, labs MET treatment parameters, ok to proceed with treatment.    Post Infusion Assessment:  Patient tolerated injection without incident.     Discharge Plan:   Copy of AVS reviewed with patient and/or family.  Patient will return 4/10 for next appointment.  Patient discharged in stable condition accompanied by: self.  Departure Mode: Ambulatory.      Berta Partida RN

## 2023-03-13 ENCOUNTER — TELEPHONE (OUTPATIENT)
Dept: AUDIOLOGY | Facility: CLINIC | Age: 68
End: 2023-03-13
Payer: MEDICARE

## 2023-03-13 NOTE — TELEPHONE ENCOUNTER
Justus lozano wondering if audiologist has had a chance to review audiogram. Confirmed I would check with provider and let her know.    -Rubi Mascorro 3/13/23

## 2023-03-14 NOTE — TELEPHONE ENCOUNTER
Called patient to notify that audiogram request has been received by records and it will be reviewed by provider and she will be informed as possible. She was appreciative of the follow up.    -Rubi Mascorro 3:10pm 3/14/23

## 2023-03-23 NOTE — TELEPHONE ENCOUNTER
"Talked with Justus to share Sandy Johnson's advice that, \"She can't say for certain if she will qualify, but she suggests keeping the appointment for CI Evaluation.\"  Patient agreed to keep appointment.    Rubi Mascorro 1:00pm   3/23/23  "

## 2023-04-10 ENCOUNTER — TRANSFERRED RECORDS (OUTPATIENT)
Dept: HEALTH INFORMATION MANAGEMENT | Facility: CLINIC | Age: 68
End: 2023-04-10

## 2023-04-10 ENCOUNTER — ALLIED HEALTH/NURSE VISIT (OUTPATIENT)
Dept: INFUSION THERAPY | Facility: CLINIC | Age: 68
End: 2023-04-10
Attending: SPECIALIST
Payer: MEDICARE

## 2023-04-10 VITALS
SYSTOLIC BLOOD PRESSURE: 137 MMHG | OXYGEN SATURATION: 98 % | DIASTOLIC BLOOD PRESSURE: 83 MMHG | RESPIRATION RATE: 16 BRPM | HEART RATE: 78 BPM | TEMPERATURE: 98 F

## 2023-04-10 DIAGNOSIS — M81.0 SENILE OSTEOPOROSIS: Primary | ICD-10-CM

## 2023-04-10 PROCEDURE — 250N000011 HC RX IP 250 OP 636: Performed by: SPECIALIST

## 2023-04-10 PROCEDURE — 96372 THER/PROPH/DIAG INJ SC/IM: CPT | Performed by: SPECIALIST

## 2023-04-10 RX ORDER — EPINEPHRINE 1 MG/ML
0.3 INJECTION, SOLUTION INTRAMUSCULAR; SUBCUTANEOUS EVERY 5 MIN PRN
Status: CANCELLED | OUTPATIENT
Start: 2023-05-03

## 2023-04-10 RX ORDER — ALBUTEROL SULFATE 0.83 MG/ML
2.5 SOLUTION RESPIRATORY (INHALATION)
Status: CANCELLED | OUTPATIENT
Start: 2023-05-03

## 2023-04-10 RX ORDER — DIPHENHYDRAMINE HYDROCHLORIDE 50 MG/ML
50 INJECTION INTRAMUSCULAR; INTRAVENOUS
Status: CANCELLED
Start: 2023-05-03

## 2023-04-10 RX ORDER — ALBUTEROL SULFATE 90 UG/1
1-2 AEROSOL, METERED RESPIRATORY (INHALATION)
Status: CANCELLED
Start: 2023-05-03

## 2023-04-10 RX ORDER — METHYLPREDNISOLONE SODIUM SUCCINATE 125 MG/2ML
125 INJECTION, POWDER, LYOPHILIZED, FOR SOLUTION INTRAMUSCULAR; INTRAVENOUS
Status: CANCELLED
Start: 2023-05-03

## 2023-04-10 RX ORDER — NALOXONE HYDROCHLORIDE 0.4 MG/ML
0.2 INJECTION, SOLUTION INTRAMUSCULAR; INTRAVENOUS; SUBCUTANEOUS
Status: CANCELLED | OUTPATIENT
Start: 2023-05-03

## 2023-04-10 RX ORDER — MEPERIDINE HYDROCHLORIDE 25 MG/ML
25 INJECTION INTRAMUSCULAR; INTRAVENOUS; SUBCUTANEOUS EVERY 30 MIN PRN
Status: CANCELLED | OUTPATIENT
Start: 2023-05-03

## 2023-04-10 RX ADMIN — ROMOSOZUMAB-AQQG 210 MG: 105 INJECTION, SOLUTION SUBCUTANEOUS at 12:38

## 2023-04-10 NOTE — PROGRESS NOTES
Infusion Nursing Note:  Justus Lozano presents today for [facility].    Patient seen by provider today: No   present during visit today: Not Applicable.    Note: Pt would like infections placed in her lower abdomen to bilateral sides. Pt denies history of stroke and MI this past year, and pt denies unusual hip, groin, or thigh pain. Pt confirms she is taking Vit D and Calcium as instructed by her provider.    Intravenous Access:  No Intravenous access/labs at this visit.    Treatment Conditions:  Lab Results   Component Value Date     02/16/2022    POTASSIUM 4.0 02/16/2022    CR 0.59 03/08/2023    HANDY 9.3 03/08/2023    BILITOTAL 0.5 09/14/2016    ALBUMIN 3.4 09/14/2016    ALT 18 09/14/2016    AST 13 09/14/2016     Results reviewed, labs MET treatment parameters, ok to proceed with treatment.    Post Infusion Assessment:  Patient tolerated injection without incident.  Site patent and intact, free from redness, edema or discomfort.  No evidence of extravasations.  Access discontinued per protocol.     Discharge Plan:   Patient declined prescription refills.  Discharge instructions reviewed with: Patient.  Patient and/or family verbalized understanding of discharge instructions and all questions answered.  AVS to patient via [employer].  Patient will return 5/10/23 for next appointment.   Patient discharged in stable condition accompanied by: self.  Departure Mode: Ambulatory.      Abeba Dubois RN

## 2023-04-13 ENCOUNTER — OFFICE VISIT (OUTPATIENT)
Dept: AUDIOLOGY | Facility: CLINIC | Age: 68
End: 2023-04-13
Payer: MEDICARE

## 2023-04-13 ENCOUNTER — PRE VISIT (OUTPATIENT)
Dept: AUDIOLOGY | Facility: CLINIC | Age: 68
End: 2023-04-13

## 2023-04-13 DIAGNOSIS — Z01.10 ENCOUNTER FOR HEARING EXAMINATION: Primary | ICD-10-CM

## 2023-04-13 DIAGNOSIS — H90.3 SENSORY HEARING LOSS, BILATERAL: Primary | ICD-10-CM

## 2023-04-13 PROCEDURE — 92550 TYMPANOMETRY & REFLEX THRESH: CPT | Performed by: AUDIOLOGIST

## 2023-04-13 PROCEDURE — 92557 COMPREHENSIVE HEARING TEST: CPT | Performed by: AUDIOLOGIST

## 2023-04-13 PROCEDURE — 92626 EVAL AUD FUNCJ 1ST HOUR: CPT | Performed by: AUDIOLOGIST

## 2023-04-13 NOTE — PROGRESS NOTES
AUDIOLOGY REPORT    SUBJECTIVE:     Justus Lozano 67 year old adult  was seen in Audiology at [facility], on 4/13/2023 to assess audiologic candidacy for a cochlear implant, which was ordered by Machelle Martinez M.D.. Justus has a diagnosis of  mild sloping to profound sensorineural hearing loss in the right ear, and a normal sloping to profound sensorineural hearing loss in the left ear. She underwent a cochlear implant evaluation in 2019 but was found not to be a candidate at that time.    Onset of hearing loss: In childhood  Identification of hearing loss: 35 years old  Etiology of hearing loss: Unknown, she reports ear infections in childhood as the potential cause  Sudden or Progressive: Progressive  Age Hearing Aid fit: 35 years old in the right ear and 43 years old in the left  Duration of Hearing Aid use: Consistent since first fit  Current Hearing Aid Type: Oticon More with right CROS transmitter  Age of current Hearing Aid: 1 yea  Tinnitus: Present bilaterally  Balance: No issues reported  Does patient exhibit intelligible vocalizations?  Yes  Primary Mode of Communication: Oral/aural/lipreading   Previous Surgeries: 2022, right wrist  Previous Ear Surgeries: N/A    OBJECTIVE:    To evaluate candidacy for cochlear implant. Candidacy requires at least a moderate to profound sensorineural hearing loss in both ears in most cases; good general health; lack of benefit from conventional amplification as determined from the tests below,:psychological/emotional stability and motivationally suitable, with realistic expectations, and absence of medical conditions contraindicating surgical intervention.     Audiometric Results:    Otoscopy revealed ears are clear of cerumen bilaterally.     Tympanogram:  RIGHT: normal eardrum mobility  LEFT: normal eardrum mobility    Reflexes (reported by stimulus ear):  RIGHT: Ipsilateral: absent at frequencies tested  RIGHT: Contralateral: absent at  frequencies tested  LEFT: Ipsilateral: present at elevated levels  LEFT: Contralateral: absent at frequencies tested    Distortion product otoacoustic emissions (DPOAEs) were not performed due to absent results in 2019.    Pure tone thresholds were assessed using conventional techniques with good reliability from 250-8000 Hz using insert earphones and circumaural headphones.  RIGHT: mild sloping to profound sensorineural hearing loss  LEFT: mild sloping to profound sensorineural hearing loss    Speech Reception Threshold:  RIGHT: 40 dB HL (SDT)  LEFT:   45 dB HL    Word Recognition Score:   RIGHT: 24% at 90 dB HL using recorded NU-6 word list.  LEFT:   36% at 85 dB HL using recorded NU-6 word list.    Device(s) used for Aided Testing:   Right ear: [facility] Phonak Zari B90-UP  Left ear: Clinic Phonak Zari B90-UP    Clinic hearing aids were programmed to NAL-NL1 prescriptive targets using simulated real ear measurements. Patient's personal hearing aids were not used due to the fact that she uses a Mamaya system.    35 minutes were spent assessing the patient's auditory rehabilitation status in order to determine whether conventional amplification is beneficial or whether the patient is an audiologic candidate for a cochlear implant.      Soundfield Thresholds (see audiogram): Aided thresholds in normal to profound rising to severe hearing loss range with right hearing aid and in normal to severe hearing loss range with left.    CNC Words Test: The patient repeats 50 single syllable words, auditory only. The words are presented at 60 dB SPL (conversational level) through a recording.     Left ear aided: words: 32%, phonemes: 63%  Right ear aided: words: 4%, phonemes: 31%      AzBio Sentences Test: The patient repeats 20 sentences, auditory only.  The sentences are presented at 60 dB SPL (conversational level) delivered through a recording.       Left ear aided: 64%, +10 dB SNR: 59%  Right ear aided: 45%  Bilaterally  aided: 73%, +10 dB SNR: 45%    ASSESSMENT:   Ear recommended for implantation: right. Patient is meeting FDA/Medicare candidacy criteria and is not receiving adequate speech understanding benefit from hearing aids.    We discussed FDA and Medicare candidacy guidelines and that Justus is meeting criteria. Other items discussed included:    -Cochlear implant systems including the internal and external components; how cochlear implants work and function differently than hearing aids and the the differences between acoustic and electric hearing.     -The cochlear implantation process including pre-surgical visits and extensive follow-up programming appointments.     -Realistic expectations. Relearning to hear will be a slow process as sound is different from acoustic hearing; time, practice, and programming is needed to optimize sound quality and speech understanding; hearing may never be ideal or as expected; and phone use, music appreciation; and understanding in background noise may not be limited.    -Risk factors:   Internal device failure; damage to the vestibular system and/or facial nerve; infection; possible loss of residual hearing on the implanted ear (although that should not affect performance outcomes); and all other risks reviewed on the Pre-Cochlear Implant Counseling handout.     Through patient interview during the consultation process this patient demonstrated the cognitive ability to use auditory clues and a willingness to undergo an extended program of rehabilitation      PLAN: Justus has been diagnosed with a bilateral sensorineural hearing loss and is a candidate for cochlear implantation in the right ear. It is recommended that they return to complete the remainder of the team evaluation; CT scan, surgeon visit, health psychology and device selection. Please contact the clinic at [phone] with questions regarding these results or recommendations.    Tato Damico, [facility]  Licensed  Audiologist  [place] License #[id number]

## 2023-04-14 ENCOUNTER — TELEPHONE (OUTPATIENT)
Dept: AUDIOLOGY | Facility: CLINIC | Age: 68
End: 2023-04-14
Payer: MEDICARE

## 2023-04-14 NOTE — TELEPHONE ENCOUNTER
Spoke to Justus and pt was asking what the 'next steps' are in cochlear implant process after determining candidacy with audiology. Gave general timeline/process info and reviewed hearing aid brands that pair with CI devices, suggested to speak with audiologist about possible trial of HA to pair with CI of interest. Assured that I would reach out as notified by our team to continue scheduling of upcoming appts. Gave direct callback number for questions on device mfrs/contacts who could connect her with CI users if wanted.    -Rubi Mascorro 4/14/23

## 2023-04-17 DIAGNOSIS — H90.5 SNHL (SENSORINEURAL HEARING LOSS): Primary | ICD-10-CM

## 2023-04-17 NOTE — TELEPHONE ENCOUNTER
Spoke with Justus to confirm appointments for CT SCAN/NCO/device selection. Questioned if I can ask audiologist about possibility of trial hearing aids that pair w/CI devices.    -Rubi Mascorro 4/17/23

## 2023-04-18 NOTE — TELEPHONE ENCOUNTER
FUTURE VISIT INFORMATION      FUTURE VISIT INFORMATION:    Date: 4/27/23    Time: 1PM    [place] [facility] [place]:    Referring provider:      Referring providers clinic:      Reason for visit/diagnosis  CT Prior    RECORDS REQUESTED FROM:       Clinic name Comments Records Status Imaging Status   [facility] [place]  4/13/23 CI OV Sandy Johnson, AuD  4/13/23 Audiogram   5/17/19 audiogram  [facility]   Fx. [id number] 4/7/22 & 3/8/21 Audiogram     rec sent to scan from [facility] 4/18/23 & req more recs   3/8/21 OV Dr Sukhdev Tan  12/8/21 OV with Dr Petty  11/22/21 OV with Dr Petty  4/7/21 OV with Dr Tan Scanned in [facility]     [facility]  6/23/21 Audiogram  Scanned in [facility]     [facility]  4/18/19 Audiogram  Scanned in [facility]                   4/18/23 6AM sent a req to [facility] for ENT records and any imaging reports if they have any - Amay   April 18, 2023 at 2:43 PM - Additional recs received from [facility] and send to scanning -Massiel

## 2023-04-19 ENCOUNTER — ANCILLARY PROCEDURE (OUTPATIENT)
Dept: CT IMAGING | Facility: CLINIC | Age: 68
End: 2023-04-19
Attending: OTOLARYNGOLOGY
Payer: MEDICARE

## 2023-04-19 DIAGNOSIS — H90.5 SNHL (SENSORINEURAL HEARING LOSS): ICD-10-CM

## 2023-04-19 PROCEDURE — G1010 CDSM STANSON: HCPCS | Mod: GC | Performed by: STUDENT IN AN ORGANIZED HEALTH CARE EDUCATION/TRAINING PROGRAM

## 2023-04-19 PROCEDURE — 70480 CT ORBIT/EAR/FOSSA W/O DYE: CPT | Mod: MG | Performed by: STUDENT IN AN ORGANIZED HEALTH CARE EDUCATION/TRAINING PROGRAM

## 2023-04-25 NOTE — PROGRESS NOTES
Otolaryngology Clinic  04/25/2023        Chief Complaint:  Visit regarding cochlear implantation    History of Present Illness:   Justus Lozano is a 67 year old female with a history of bilateral sensorineural hearing loss who presents for consultation regarding cochlear implantation. Patient was seen by audiology on 4/13/23 for CI evaluation, and was a candidate for right ear implantation. Here with her .    She reports that she always failed hearing screening as a child but she was unable to recall whether it was one side or both. She has not noticed any hearing loss herself until her 30s. She report gradual hearing loss more rapid in her right side. She started using her right side hearing aid when she was 35 years old and left side hearing aid when she was 43. She has been using conventional hearing aid in her right side up until about 1 year ago where she switched to Oticon CROS hearing aid. That was when she noted that she was not hearing much from her right ear and was not benefiting from the regular hearing aid. She still has some low frequency tone hearing but it sounds distorted. She does not have speech understanding from her right side. Denies any family history of early hearing loss. She has a history of multiple ear infections as a child. Denies any otalgia, otorrhea, or dizziness currently. No prior history of ear surgeries. She was told that she has two canal squamous cysts by an outside ENT physician and these were removed in clinic with local anesthetics.     Onset of hearing loss: In childhood  Identification of hearing loss: 35 years old  Etiology of hearing loss: Unknown, she reports ear infections in childhood as the potential cause  Sudden or Progressive: Progressive  Age Hearing Aid fit: 35 years old in the right ear and 43 years old in the left  Duration of Hearing Aid use: Consistent since first fit  Current Hearing Aid Type: Oticon More with right CROS transmitter  Age of  current Hearing Aid: 1 yea  Tinnitus: Present bilaterally  Balance: No issues reported  Does patient exhibit intelligible vocalizations?  Yes  Primary Mode of Communication: Oral/aural/lipreading   Previous Surgeries: 2022, right wrist  Previous Ear Surgeries: N/A     Active Medications:     Current Outpatient Medications:      acetaminophen (TYLENOL) 500 MG tablet, Take 2 tablets (1,000 mg) by mouth every 6 hours as needed for mild pain or fever ; maximum 4 grams/day of acetaminophen from all sources., Disp: 60 tablet, Rfl: 1     calcium carbonate 500 mg, elemental, (OSCAL 500) 1250 (500 Ca) MG TABS tablet, Take 1 tablet by mouth daily, Disp: , Rfl:      estradiol (ESTRACE) 0.1 MG/GM vaginal cream, Place 1 g vaginally twice a week , Disp: , Rfl:      Multiple Vitamins-Minerals (PRESERVISION AREDS 2 PO), Take 1 tablet by mouth daily, Disp: , Rfl:      polyethylene glycol (MIRALAX) 17 GM/Dose powder, Take 17 g by mouth daily ; hold for loose stools/diarrhea., Disp: 510 g, Rfl: 1     rosuvastatin (CRESTOR) 5 MG tablet, Take 5 mg by mouth every evening, Disp: , Rfl:      senna-docusate (SENOKOT-S/PERICOLACE) 8.6-50 MG tablet, Take 2 tablets by mouth 2 times daily ; hold for loose stools/diarrhea., Disp: 60 tablet, Rfl: 1     SUMAtriptan Succinate (IMITREX PO), Take 50 mg by mouth every 8 hours as needed for migraine As needed for migraine, Disp: , Rfl:      vitamin D3 (CHOLECALCIFEROL) 50 mcg (2000 units) tablet, Take 1 tablet by mouth daily, Disp: , Rfl:      zolpidem (AMBIEN) 10 MG tablet, Take 5 mg by mouth nightly as needed for sleep , Disp: , Rfl:       Allergies:   Celebrex [celecoxib] and Sulfamethoxazole      Past Medical History:  Past Medical History:   Diagnosis Date     Arthritis     bilateral thumbs     Disorder of bone and cartilage, unspecified      Hearing loss     BILAT HEARING AIDS     Hernia, inguinal      Insomnia      Metrorrhagia      Migraine, unspecified, without mention of intractable migraine  without mention of status migrainosus      Thoracic or lumbosacral neuritis or radiculitis, unspecified      Urticaria, unspecified      Viral warts, unspecified      Patient Active Problem List   Diagnosis     Right wrist Fracture     Compression fracture of T11 vertebra, initial encounter (H)     COVID-19 Jan 2022, Now Recovered (still positive 2/15/22)     Fall     Senile osteoporosis        Past Surgical History:  Past Surgical History:   Procedure Laterality Date     ABDOMEN SURGERY  3-    Robotic hysterectomy     ABDOMINAL SACROCOLPOPEXY       HC REMOVE TONSILS/ADENOIDS,12+ Y/O  10 TH GRADE     LAPAROSCOPIC HERNIORRHAPHY FEMORAL Bilateral 12/7/2015    Procedure: LAPAROSCOPIC HERNIORRHAPHY FEMORAL;  Surgeon: Tian Mata MD;  Location:  [facility]     OPEN REDUCTION INTERNAL FIXATION WRIST Right 2/18/2022    Procedure: OPEN REDUCTION INTERNAL FIXATION, OF DISTAL RADIUS FRACTURE;  Surgeon: Damien Flores MD;  Location:  OR     PERINEOPLASTY       RECTOCELE REPAIR       ELISA BSO         Family History:   Family History   Problem Relation Age of Onset     Hypertension Mother      Osteoporosis Mother      Heart Disease Father      Cerebrovascular Disease Father          Social History:   Social History     Tobacco Use     Smoking status: Never     Smokeless tobacco: Never   Substance Use Topics     Alcohol use: No     Drug use: No     Physical examination:  Constitutional:  In no acute distress, appears stated age  Eyes:  Extraocular movements intact, no spontaneous nystagmus  Ears:  Both ears examined under the microscope.    - Right: Ear canal clear. TM intact and middle ear aerated.   - Left: Ear canal with mild cerumen impaction, debrided with alligator forceps. There is a small inferior canal squamous cyst. She had some squamous pearls removed at an outside clinic in the past. This was attempted to be removed in clinic today but she was unable to tolerate removal due to pain. TM intact and  middle ear aerated.   Respiratory:  No increased work of breathing, wheezing or stridor  Musculoskeletal:  Good upper extremity strength  Skin:  No rashes on the head and neck  Neurologic:  House Brackman 1/6 bilaterally, ambulating normally  Psychiatric:  Alert, normal affect, answering questions appropriately    Audiogram:  AUDIOGRAM: Justus Lozano underwent an audiogram 4/13/23. This demonstrated: right mild rapidly downsloping to profound sensorineural hearing loss, left mild rapidly downsloping to severe sensorineural hearing loss.      Right: Speech reception threshold is 40 dB with 24% word recognition. Tympanogram A type   Left: Speech reception threshold is 45 dB with 36% word recognition. Tympanogram A type     4/13/2023  CNC Words Test:   Left ear aided: words: 32%, phonemes: 63%  Right ear aided: words: 4%, phonemes: 31%  AzBio Sentences Test:   Left ear aided: 64%, +10 dB SNR: 59%  Right ear aided: 45%  Bilaterally aided: 73%, +10 dB SNR: 45%    Audiogram and cochlear implant evaluation was independently reviewed.    Imaging:  CT temporal 4/19/23  Impression:  Right temporal bone:  1. Questionable hypodensity in the right fistula ante fenestrum raising concern for otospongiosis. Recommend clinical correlation and attention on follow-up.  2. Borderline enlarged right vestibular aqueduct. This may simply be an incidental variation as the remainder of the temporal bone is normal. Consider MRI for further investigation if needed.  Left temporal bone: Normal.    CT Temporal bone was independently reviewed. This demonstrated bilateral well developed well aerated mastoids and middle ear. Right side with enlarged vestibular aqueduct measuring about 1.8mm close to midpoint. Other inner ear structures normal. Left side with normal vestibular aqueduct and normal otic capsule. Facial nerves in their usual positions. No lesions noted in the left ear canal.    Assessment and Plan:  Justus Lozano is a 67 year old  adult who presents for cochlear implant consultation. She is a candidate for RIGHT side cochlear implantation. A hearing aid trial has been conducted with no benefit.  There are measurable auditory thresholds indicating a functioning cochlear nerve.  The patient and the family are highly motivated to proceed with implantation and postoperative rehabilitation.  Through patient interview during the consultation process, this patient demonstrated the cognitive ability to use auditory clues and a willingness to undergo an extended program of rehabilitation.  There is no evidence of middle ear disease.  Imaging shows a patent cochlea and cochlear nerve.  The devices discussed with the patient are all FDA approved devices. The risks and benefits of implantation were also discussed.  Risks include:  Expected loss of residual hearing, worsened tinnitus which may improve with activation of the device, dizziness, damage to the taste nerve, damage to the facial nerve, infection with need for explantation and reimplantation, device failure, and possible need for further surgery.  It was also discussed that all implant recipients are at greater risk for meningitis and the need for pneumococcal vaccination. She would like to proceed with RIGHT side cochlear implantation. We will proceed with LEFT ear exam and removal of the inferior ear canal surface squamous cyst at the same time. The patient expressed understanding and is in agreement with this plan. All questions were answered.     Follow-up: proceed with RIGHT ear cochlear implantation and LEFT ear canal surface squamous cyst removal    Laurita Mejia MD MPH   Fellow Physician  Otology & Neurotology  [facility]     Scribe Preparation Attestation:  Merlin OBREGON, a scribe, prepared the chart for today's encounter.    The documentation recorded by the scribe accurately reflects the services I personally performed and the decisions made by me.    Alem OBREGON  MD Carrillo, saw this patient with the resident/fellow and agree with the resident/fellow s findings and plan of care as documented in the resident s/fellow s note. I was present for the entire procedure.    Alem Vizcaino MD

## 2023-04-27 ENCOUNTER — TELEPHONE (OUTPATIENT)
Dept: AUDIOLOGY | Facility: CLINIC | Age: 68
End: 2023-04-27

## 2023-04-27 ENCOUNTER — PRE VISIT (OUTPATIENT)
Dept: OTOLARYNGOLOGY | Facility: CLINIC | Age: 68
End: 2023-04-27

## 2023-04-27 ENCOUNTER — OFFICE VISIT (OUTPATIENT)
Dept: OTOLARYNGOLOGY | Facility: CLINIC | Age: 68
End: 2023-04-27
Payer: MEDICARE

## 2023-04-27 VITALS
SYSTOLIC BLOOD PRESSURE: 154 MMHG | DIASTOLIC BLOOD PRESSURE: 85 MMHG | BODY MASS INDEX: 20.61 KG/M2 | WEIGHT: 136 LBS | HEIGHT: 68 IN | OXYGEN SATURATION: 100 % | TEMPERATURE: 98 F | HEART RATE: 73 BPM

## 2023-04-27 DIAGNOSIS — Q18.1 CYST OF EAR CANAL: ICD-10-CM

## 2023-04-27 DIAGNOSIS — H90.3 SENSORINEURAL HEARING LOSS (SNHL) OF BOTH EARS: Primary | ICD-10-CM

## 2023-04-27 PROCEDURE — 92504 EAR MICROSCOPY EXAMINATION: CPT | Mod: GC | Performed by: OTOLARYNGOLOGY

## 2023-04-27 PROCEDURE — 99204 OFFICE O/P NEW MOD 45 MIN: CPT | Mod: 25 | Performed by: OTOLARYNGOLOGY

## 2023-04-27 RX ORDER — DEXAMETHASONE SODIUM PHOSPHATE 4 MG/ML
10 INJECTION, SOLUTION INTRA-ARTICULAR; INTRALESIONAL; INTRAMUSCULAR; INTRAVENOUS; SOFT TISSUE ONCE
Status: CANCELLED | OUTPATIENT
Start: 2023-04-27 | End: 2023-04-27

## 2023-04-27 RX ORDER — CEFAZOLIN SODIUM 2 G/50ML
2 SOLUTION INTRAVENOUS SEE ADMIN INSTRUCTIONS
Status: CANCELLED | OUTPATIENT
Start: 2023-04-27

## 2023-04-27 RX ORDER — CEFAZOLIN SODIUM 2 G/50ML
2 SOLUTION INTRAVENOUS
Status: CANCELLED | OUTPATIENT
Start: 2023-04-27

## 2023-04-27 RX ORDER — ACETAMINOPHEN 325 MG/1
975 TABLET ORAL ONCE
Status: CANCELLED | OUTPATIENT
Start: 2023-04-27 | End: 2023-04-27

## 2023-04-27 ASSESSMENT — PAIN SCALES - GENERAL: PAINLEVEL: NO PAIN (0)

## 2023-04-27 NOTE — NURSING NOTE
"Chief Complaint   Patient presents with     Consult     Cochlear implant      Blood pressure (!) 154/85, pulse 73, temperature 98  F (36.7  C), height 1.727 m (5' 8\"), weight 61.7 kg (136 lb), SpO2 100 %.    Arnoldo Hurst LPN    "

## 2023-04-27 NOTE — PATIENT INSTRUCTIONS
1. You were seen in the ENT Clinic today by Dr. Vizcaino.  If you have any questions or concerns after your appointment, please call   - Option 1: ENT Clinic: [phone]   - Option 2: Anita (Dr. Vizcaino's Nurse): [phone]          Lamar (Dr. Vizcaino's Nurse): [phone]     2.   Our surgery schedulers will reach out to your regarding a surgery date. Once you confirm a surgery date, then you should schedule your pre-op appointment within 30 days of surgery. Schedulers will also mail you a surgery packet with further details about the surgery process, and will also include scrub soap. There will be instructions in the packet of how to use the scrub soap on the night before and morning of surgery.    Cochlear Implant Pre-Operative and Post-Operative Instructions:    Siria, our surgery scheduler, will contact you with a potential date for surgery after obtaining prior authorization for cochlear implant surgery. The Prior Authorization Process can take 4-6 weeks, and Siria is unable to call you until it is finalized through this process.      PRE-PROCEDURE HIGHLIGHTS: COCHLEAR IMPLANT     1.PNEUMOCOCCAL VACCINATIONS - summary below:  Meningitis is an infection of the fluid that surrounds the brain and spinal cord. There are two main types of meningitis, viral and bacterial. Bacterial meningitis is the more serious type and the type that has been reported in individuals with cochlear implants. The symptoms, treatment, and outcomes may differ depending on the cause of the meningitis. Meningitis in individuals with cochlear implants is most commonly caused by the bacterium Streptococcus pneumoniae (pneumococcus). Pneumococcal vaccination protects against Streptococcus pneumoniae (commonly referred to as pneumococcus), the bacterium most commonly responsible for meningitis after cochlear implant surgery. Although extremely rare, meningitis has been reported worldwide in patients with cochlear implants, and can be immensely  serious. To minimize the risk of infection, we require all cochlear implant patients to receive age-appropriate pneumococcal vaccination and to provide us with proof of vaccination. We are taking these precautions in accordance with guidelines established by the Food and Drug Administration (FDA), Center for Disease Control (CDC), and state health departments.     When should I get the vaccines?  Your age and shot history affect which vaccine you should get and when. Talk to your provider. The vaccine guidelines that we follow are from the Centers for Disease Control (CDC).     Vaccine guidelines for adults getting a cochlear implant:  If you have never had a pneumococcal vaccine, you should get the PCV20 (Bccajlq33) or PCV15 (Vaxneuvance).   If you get the PCV20, you do not need any other vaccines.   If you get the PCV15, you will need a second vaccine (the Pneumovax 23) at least 8 weeks later.   If you have previously received a dose of the Pneumovax 23:   You should get 1 dose of the PCV20 (Qjylkvu07) or PCV15 (Vaxneuvance) at least 1 year after receiving the Pneumovax 23.   If you have previously received a dose of the Impfyzg04:   You should get 1 dose of the Wdqdomyyx27 at least 8 weeks after receiving the Xafzzzk47.   If you have previously received both the Juauukqyz60 and Vamgwgl68:   You do not need any other vaccines. Together, Uolbhdy93 and Qrysexlte02 meet the meningitis vaccine guidelines for cochlear implant    **Finishing the vaccination process is important, but may need to be finished after surgery due to the recommended vaccine schedule- may need to be one year apart (see vaccine handout above).    To learn more:  Talk to your provider. You can also visit these CDC websites:  https://www.cdc.gov/vaccines/vpd/pneumo/public/index.html  https://www.cdc.gov/vaccines/vpd/pneumo/hcp/auq-ylur-eo-vaccinate.html  http://www.cdc.gov/vaccines/vpd-vac/mening/cochlear/egd-dulnfnci-kep-hcp.htm       **Please  have your clinic fax us the proof of immunizations  [phone].Cochlear implant candidates must have completed at least part of the immunization requirements prior to surgery. Full compliance with the full immunization schedule must be documented as soon as possible.      2. HISTORY AND PHYSICAL: You must have a physical exam (called  history and physical ) within 30 days of surgery. You can do this at the Nurse Practitioner Clinic here at the [facility] or your family clinic. Your doctor will let you know if you need to have this physical completed by our PAC (Preoperative Assessment Center) team at the [facility]. Bring the paper copy of the Pre-Op Surgery Form with you to your primary doctor if they are outside of the [facility] system, as this paper form includes our fax number.     3. NO MOTRIN, IBUPROFEN, ASPIRIN, ALEVE, GARLIC SUPPLEMENTS or FISH OIL x 7 days prior to surgery ( to prevent excess bleeding and bruising at time of surgery)     4. Review written material in Cochlear implant surgery teaching packet and always feel free contact us for further questions.      5. Audiology appointments as recommended before surgery (example: device selection appointment)     6. Pre-op Shower: Take a bath or shower the night before and the morning of surgery (refer to surgery packet for extra information). You should use the soap that we mailed to you or gave in clinic (2 small bottles). Use the entire bottle of soap for each shower, washing from the neck down, then rinsing off. Sleep in clean clothing and use clean bedding sheets. If your doctor does not give you special soap, buy Hibiclens or Siobhan-Stat at the drug store or ask the pharmacist to suggest a brand. Do not put on lotion, powder, perfume, deodorant or make-up after bathing.      What else should I know about the surgery?  Any hearing you had in the implant ear may be gone after surgery.  You will depend upon a cochlear implant for the rest of your life  If the implant  is removed, you will not regain the hearing you had before surgery.  New bone could grow and push your cochlear implant out of place. If this happens, you will need another surgery.  The skin at the site could get infected or wear away. If this happens, we may have to remove the implant. It may be possible to re-implant the device.  The device may stop working. If this happens, we may be able to replace it. We do not yet know the average lifetime of cochlear implants.  If you need a medical procedure done the head or neck, talk to your surgeon first.   If you need an MRI, you must first see your surgeon. If the implant is exposed to an MRI device, it can harm the implant or the person. We suggest you get a medical ID bracelet stating you have a cochlear implant.     We may not do the implant if we find:   A leak from your cerebrospinal fluid (fluid from the spinal cord and brain)  The cochlea is filled in with bone.  It is too hard to reach the cochlea.           POST SURGERY CARE/HIGHLIGHTS: COCHLEAR IMPLANT     1.   What to expect after surgery:  Nausea: Some people feel very sick and others do not. If it is severe, you may stay overnight.  Dizziness: This may be mild. If it is severe, you may stay overnight.  Tasting blood or coughing up a small amount of blood: This is normal.  Sore throat: Your throat may be scratchy from the breathing tube used during surgery.  Sore neck: Your neck may be sore because, during surgery, your head was turned to one side for an extended period of time.  Metal taste in the mouth: This may happen if the taste nerve was injured during surgery. The bad taste may last up to a couple of months.  Roaring in the ears or tinnitus: This is common and may go away with time.  Mild or generalized swelling: This will go down slowly over 2 months.  Numbness: Your ear will be numb. Gradually, feeling will return. Sometimes the very top of the ear remains numb.    **Most of the effects of surgery  should go away within one to two weeks. Some effects could be permanent.      2.  Pain/Medication:  If you have pain, you may take Tylenol. Your doctor may also prescribe pain medicine. This medicine may cause constipation.  You should have a bowel movement within three days of surgery.  If not, ask your pharmacist about an over the counter stool softener.      3.   Wound care:    You can remove your head dressing in 2 days.    Leave the steri-strips (band aids) in place- they will fall off on their own in 10-12 days- you may trim the edges as they loosen.    Dry Ear Precautions - prior to shower: gently place cotton ball into ear canal, cover external portion with Vaseline to form a seal and repel water. Remove cotton ball afterward.  Do not shower until 48 hours after surgery. It is best to shower with a shower cap in place and then use a cup to cover the ear to wash hair over the sink. Do not let your incision be soaked with water until the follow up appointment. Keep incision clean and dry, do not scrub, pat to dry.  After 48 hours, you may wash your hair with gentle rubbing. Rub along the incision and do not pull against the incision line. Wait 6-8 weeks before you color or perm your hair.  If you wear glasses, please remove the ear piece on the surgical side until post op visit with provider. Thi will avoid pressure near the incision while is heals, and healing takes about 2-3 weeks. (Check with nurse if any questions.)      4. Activity Restrictions:  For the next 2 weeks, no heavy lifting more than 5 pounds, no strenuous activities. No nose blowing. Sneeze with your mouth open. Stool softeners as needed to avoid straining with bowel movement. Avoid activities which cause you to bear down or strain, or increase head pressure until first postop (possibly longer)  Keep head of bed up with 1-2 pillows for about 1 week after surgery. Avoid laying flat in bed.    5. Generally, return to work is 1-2 weeks for  employment that hearing is not required or has been adapted for hearing loss. Use of implant in the workplace will come in time with cochlear programming/ rehabilitation, each individual has a slightly different rehabilitation course, which will also be dictated by recovery from surgery and your surgeon's recommendations.     6. One to Three weeks after surgery, you will have a follow- up visit with your surgeon. If you are not scheduled, please call the ENT clinic at [phone].       **When to call us:  Call your surgeon right away if your face feels weak after surgery  Clear fluid coming from your nose or the incision  Redness, pain or any drainage from the incision  Swelling of the wound (some generalized swelling is normal)  Pain that is uncontrolled with medication  Pain or cramping in your legs  A fever of 100 F (37.7 C) for 24 hours or longer  Severe dizziness or problems with balance  Sensitivity along the incision line due to the dissolvable stitches: if you notice dryness, crust or breakdown of any kind along the incision line, please call the clinic for instructions. In most cases, this will go away within 3-4 weeks.    How to Contact Us:  Send a [employer] message to your provider. Our team will respond to you via [employer]. Occasionally, we will need to call you to get further information.  For urgent matters (Monday-Friday), call the ENT Clinic: [phone] and speak with a call center team member - they will route your call appropriately.   If you'd like to speak directly with a nurse, please find our contact information below. We do our best to check voicemail frequently throughout the day, and will work to call you back within 1-2 days. For urgent matters, please use the general clinic phone numbers listed above.      Lamar CARTAGENA RN  [phone]  Anita FELIX LPN  [facility] - Otolaryngology

## 2023-04-27 NOTE — TELEPHONE ENCOUNTER
"Spoke to Justus. Her outside hearing care provider does not have models to try that pair with CI (Phonak/Resound). She is interested in HA trial of those because, \"It is as important for HA to work/pair with CI'.   Told her that I would look into the possibility of trial-ing here/will ask audiologist.    She has the contact of Kristina Garcia with [employer], but also wants patient outreach for [facility]Cochlear. I will find and provide her with those.     -Rubi Mascorro 4/27/23  "

## 2023-04-27 NOTE — LETTER
4/27/2023      RE: Justus Lozano  [address] [facility] [address]         Otolaryngology Clinic  04/25/2023        Chief Complaint:  Visit regarding cochlear implantation    History of Present Illness:   Justus Lozano is a 67 year old female with a history of bilateral sensorineural hearing loss who presents for consultation regarding cochlear implantation. Patient was seen by audiology on 4/13/23 for CI evaluation, and was a candidate for right ear implantation. Here with her .    She reports that she always failed hearing screening as a child but she was unable to recall whether it was one side or both. She has not noticed any hearing loss herself until her 30s. She report gradual hearing loss more rapid in her right side. She started using her right side hearing aid when she was 35 years old and left side hearing aid when she was 43. She has been using conventional hearing aid in her right side up until about 1 year ago where she switched to Oticon CROS hearing aid. That was when she noted that she was not hearing much from her right ear and was not benefiting from the regular hearing aid. She still has some low frequency tone hearing but it sounds distorted. She does not have speech understanding from her right side. Denies any family history of early hearing loss. She has a history of multiple ear infections as a child. Denies any otalgia, otorrhea, or dizziness currently. No prior history of ear surgeries. She was told that she has two canal squamous cysts by an outside ENT physician and these were removed in clinic with local anesthetics.     Onset of hearing loss: In childhood  Identification of hearing loss: 35 years old  Etiology of hearing loss: Unknown, she reports ear infections in childhood as the potential cause  Sudden or Progressive: Progressive  Age Hearing Aid fit: 35 years old in the right ear and 43 years old in the left  Duration of Hearing Aid use: Consistent since  first fit  Current Hearing Aid Type: Oticon More with right CROS transmitter  Age of current Hearing Aid: 1 yea  Tinnitus: Present bilaterally  Balance: No issues reported  Does patient exhibit intelligible vocalizations?  Yes  Primary Mode of Communication: Oral/aural/lipreading   Previous Surgeries: 2022, right wrist  Previous Ear Surgeries: N/A     Active Medications:     Current Outpatient Medications:     acetaminophen (TYLENOL) 500 MG tablet, Take 2 tablets (1,000 mg) by mouth every 6 hours as needed for mild pain or fever ; maximum 4 grams/day of acetaminophen from all sources., Disp: 60 tablet, Rfl: 1    calcium carbonate 500 mg, elemental, (OSCAL 500) 1250 (500 Ca) MG TABS tablet, Take 1 tablet by mouth daily, Disp: , Rfl:     estradiol (ESTRACE) 0.1 MG/GM vaginal cream, Place 1 g vaginally twice a week , Disp: , Rfl:     Multiple Vitamins-Minerals (PRESERVISION AREDS 2 PO), Take 1 tablet by mouth daily, Disp: , Rfl:     polyethylene glycol (MIRALAX) 17 GM/Dose powder, Take 17 g by mouth daily ; hold for loose stools/diarrhea., Disp: 510 g, Rfl: 1    rosuvastatin (CRESTOR) 5 MG tablet, Take 5 mg by mouth every evening, Disp: , Rfl:     senna-docusate (SENOKOT-S/PERICOLACE) 8.6-50 MG tablet, Take 2 tablets by mouth 2 times daily ; hold for loose stools/diarrhea., Disp: 60 tablet, Rfl: 1    SUMAtriptan Succinate (IMITREX PO), Take 50 mg by mouth every 8 hours as needed for migraine As needed for migraine, Disp: , Rfl:     vitamin D3 (CHOLECALCIFEROL) 50 mcg (2000 units) tablet, Take 1 tablet by mouth daily, Disp: , Rfl:     zolpidem (AMBIEN) 10 MG tablet, Take 5 mg by mouth nightly as needed for sleep , Disp: , Rfl:       Allergies:   Celebrex [celecoxib] and Sulfamethoxazole      Past Medical History:  Past Medical History:   Diagnosis Date    Arthritis     bilateral thumbs    Disorder of bone and cartilage, unspecified     Hearing loss     BILAT HEARING AIDS    Hernia, inguinal     Insomnia     Metrorrhagia      Migraine, unspecified, without mention of intractable migraine without mention of status migrainosus     Thoracic or lumbosacral neuritis or radiculitis, unspecified     Urticaria, unspecified     Viral warts, unspecified      Patient Active Problem List   Diagnosis    Right wrist Fracture    Compression fracture of T11 vertebra, initial encounter ([facility])    COVID-19 Jan 2022, Now Recovered (still positive 2/15/22)    Fall    Senile osteoporosis        Past Surgical History:  Past Surgical History:   Procedure Laterality Date    ABDOMEN SURGERY  3-    Robotic hysterectomy    ABDOMINAL SACROCOLPOPEXY      HC REMOVE TONSILS/ADENOIDS,10+ Y/O  10 TH GRADE    LAPAROSCOPIC HERNIORRHAPHY FEMORAL Bilateral 12/7/2015    Procedure: LAPAROSCOPIC HERNIORRHAPHY FEMORAL;  Surgeon: Tian Mata MD;  Location:  [facility]    OPEN REDUCTION INTERNAL FIXATION WRIST Right 2/18/2022    Procedure: OPEN REDUCTION INTERNAL FIXATION, OF DISTAL RADIUS FRACTURE;  Surgeon: Damien Flores MD;  Location: [facility] OR    PERINEOPLASTY      RECTOCELE REPAIR      ELISA BSO         Family History:   Family History   Problem Relation Age of Onset    Hypertension Mother     Osteoporosis Mother     Heart Disease Father     Cerebrovascular Disease Father          Social History:   Social History     Tobacco Use    Smoking status: Never    Smokeless tobacco: Never   Substance Use Topics    Alcohol use: No    Drug use: No     Physical examination:  Constitutional:  In no acute distress, appears stated age  Eyes:  Extraocular movements intact, no spontaneous nystagmus  Ears:  Both ears examined under the microscope.    - Right: Ear canal clear. TM intact and middle ear aerated.   - Left: Ear canal with mild cerumen impaction, debrided with alligator forceps. There is a small inferior canal squamous cyst. She had some squamous pearls removed at an outside clinic in the past. This was attempted to be removed in clinic today but she was unable to  tolerate removal due to pain. TM intact and middle ear aerated.   Respiratory:  No increased work of breathing, wheezing or stridor  Musculoskeletal:  Good upper extremity strength  Skin:  No rashes on the head and neck  Neurologic:  House Brackman 1/6 bilaterally, ambulating normally  Psychiatric:  Alert, normal affect, answering questions appropriately    Audiogram:  AUDIOGRAM: Justus Lozano underwent an audiogram 4/13/23. This demonstrated: right mild rapidly downsloping to profound sensorineural hearing loss, left mild rapidly downsloping to severe sensorineural hearing loss.      Right: Speech reception threshold is 40 dB with 24% word recognition. Tympanogram A type   Left: Speech reception threshold is 45 dB with 36% word recognition. Tympanogram A type     4/13/2023  CNC Words Test:   Left ear aided: words: 32%, phonemes: 63%  Right ear aided: words: 4%, phonemes: 31%  [employer] Sentences Test:   Left ear aided: 64%, +10 dB SNR: 59%  Right ear aided: 45%  Bilaterally aided: 73%, +10 dB SNR: 45%    Audiogram and cochlear implant evaluation was independently reviewed.    Imaging:  CT temporal 4/19/23  Impression:  Right temporal bone:  1. Questionable hypodensity in the right fistula ante fenestrum raising concern for otospongiosis. Recommend clinical correlation and attention on follow-up.  2. Borderline enlarged right vestibular aqueduct. This may simply be an incidental variation as the remainder of the temporal bone is normal. Consider MRI for further investigation if needed.  Left temporal bone: Normal.    CT Temporal bone was independently reviewed. This demonstrated bilateral well developed well aerated mastoids and middle ear. Right side with enlarged vestibular aqueduct measuring about 1.8mm close to midpoint. Other inner ear structures normal. Left side with normal vestibular aqueduct and normal otic capsule. Facial nerves in their usual positions. No lesions noted in the left ear canal.    Assessment  and Plan:  Justus Lozano is a 67 year old adult who presents for cochlear implant consultation. She is a candidate for RIGHT side cochlear implantation. A hearing aid trial has been conducted with no benefit.  There are measurable auditory thresholds indicating a functioning cochlear nerve.  The patient and the family are highly motivated to proceed with implantation and postoperative rehabilitation.  Through patient interview during the consultation process, this patient demonstrated the cognitive ability to use auditory clues and a willingness to undergo an extended program of rehabilitation.  There is no evidence of middle ear disease.  Imaging shows a patent cochlea and cochlear nerve.  The devices discussed with the patient are all FDA approved devices. The risks and benefits of implantation were also discussed.  Risks include:  Expected loss of residual hearing, worsened tinnitus which may improve with activation of the device, dizziness, damage to the taste nerve, damage to the facial nerve, infection with need for explantation and reimplantation, device failure, and possible need for further surgery.  It was also discussed that all implant recipients are at greater risk for meningitis and the need for pneumococcal vaccination. She would like to proceed with RIGHT side cochlear implantation. We will proceed with LEFT ear exam and removal of the inferior ear canal surface squamous cyst at the same time. The patient expressed understanding and is in agreement with this plan. All questions were answered.     Follow-up: proceed with RIGHT ear cochlear implantation and LEFT ear canal surface squamous cyst removal    Laurita Mejia MD MPH   Fellow Physician  Otology & Neurotology  [facility]     Scribe Preparation Attestation:  I, Merlin Hassan, a scribe, prepared the chart for today's encounter.    The documentation recorded by the scribe accurately reflects the services I personally performed  and the decisions made by me.    I, Alem Vizcaino MD, saw this patient with the resident/fellow and agree with the resident/fellow s findings and plan of care as documented in the resident s/fellow s note. I was present for the entire procedure.    Alem Vizcaino MD

## 2023-04-28 PROBLEM — Q18.1 CYST OF EAR CANAL: Status: ACTIVE | Noted: 2023-04-28

## 2023-04-28 PROBLEM — H90.3 SENSORINEURAL HEARING LOSS (SNHL) OF BOTH EARS: Status: ACTIVE | Noted: 2023-04-28

## 2023-05-01 NOTE — TELEPHONE ENCOUNTER
Justus wanted to share that she met with Kristina Garcia and was grateful for the connection - is really looking forward to CI surgery after meeting and learning about the technology/device. She would like to have a loaner hearing aid (with AB) programmed with audiology to trial before use. I will contact audiologist for possibility/scheduling.    She may still meet with Cochle[employer] rep, but for now is leaning toward [employer].    She is 'anxious' to get surgery scheduled soon and would like to be added to any cancellation/waitlist dates for when surgery is scheduled (July seems like a long time). I explained that I did not have access to surgery scheduling process and that she is probably having to wait as is typical for scheduling/insurance authorization/etc. Will message ENT team to let them know.    -Rubi Mascorro

## 2023-05-09 ENCOUNTER — TELEPHONE (OUTPATIENT)
Dept: OTOLARYNGOLOGY | Facility: CLINIC | Age: 68
End: 2023-05-09
Payer: MEDICARE

## 2023-05-09 NOTE — TELEPHONE ENCOUNTER
Patient left a voicemail requesting a callback to schedule surgery with Dr. Carrillo Kennedy, Perioperative Coordinator, ENT 5/9/2023 at 12:13 PM

## 2023-05-09 NOTE — TELEPHONE ENCOUNTER
Left patient a voicemail to schedule INSERTION, IMPLANT, COCHLEAR, WITH INTRAOPERATIVE FACIAL NERVE MONITORING (Right) EXCISION, LESION, EAR CANAL (Left)  with Dr. Carrillo Kennedy on 5/9/2023 at 10:33 AM

## 2023-05-10 ENCOUNTER — ALLIED HEALTH/NURSE VISIT (OUTPATIENT)
Dept: INFUSION THERAPY | Facility: CLINIC | Age: 68
End: 2023-05-10
Attending: SPECIALIST
Payer: MEDICARE

## 2023-05-10 VITALS
TEMPERATURE: 97.9 F | SYSTOLIC BLOOD PRESSURE: 127 MMHG | HEART RATE: 82 BPM | RESPIRATION RATE: 18 BRPM | DIASTOLIC BLOOD PRESSURE: 75 MMHG

## 2023-05-10 DIAGNOSIS — M81.0 SENILE OSTEOPOROSIS: Primary | ICD-10-CM

## 2023-05-10 PROCEDURE — 96372 THER/PROPH/DIAG INJ SC/IM: CPT | Performed by: SPECIALIST

## 2023-05-10 PROCEDURE — 250N000011 HC RX IP 250 OP 636: Performed by: SPECIALIST

## 2023-05-10 RX ORDER — EPINEPHRINE 1 MG/ML
0.3 INJECTION, SOLUTION INTRAMUSCULAR; SUBCUTANEOUS EVERY 5 MIN PRN
Status: CANCELLED | OUTPATIENT
Start: 2023-06-05

## 2023-05-10 RX ORDER — NALOXONE HYDROCHLORIDE 0.4 MG/ML
0.2 INJECTION, SOLUTION INTRAMUSCULAR; INTRAVENOUS; SUBCUTANEOUS
Status: CANCELLED | OUTPATIENT
Start: 2023-06-05

## 2023-05-10 RX ORDER — DIPHENHYDRAMINE HYDROCHLORIDE 50 MG/ML
50 INJECTION INTRAMUSCULAR; INTRAVENOUS
Status: CANCELLED
Start: 2023-06-05

## 2023-05-10 RX ORDER — MEPERIDINE HYDROCHLORIDE 25 MG/ML
25 INJECTION INTRAMUSCULAR; INTRAVENOUS; SUBCUTANEOUS EVERY 30 MIN PRN
Status: CANCELLED | OUTPATIENT
Start: 2023-06-05

## 2023-05-10 RX ORDER — ALBUTEROL SULFATE 90 UG/1
1-2 AEROSOL, METERED RESPIRATORY (INHALATION)
Status: CANCELLED
Start: 2023-06-05

## 2023-05-10 RX ORDER — ALBUTEROL SULFATE 0.83 MG/ML
2.5 SOLUTION RESPIRATORY (INHALATION)
Status: CANCELLED | OUTPATIENT
Start: 2023-06-05

## 2023-05-10 RX ORDER — METHYLPREDNISOLONE SODIUM SUCCINATE 125 MG/2ML
125 INJECTION, POWDER, LYOPHILIZED, FOR SOLUTION INTRAMUSCULAR; INTRAVENOUS
Status: CANCELLED
Start: 2023-06-05

## 2023-05-10 RX ADMIN — ROMOSOZUMAB-AQQG 210 MG: 105 INJECTION, SOLUTION SUBCUTANEOUS at 12:52

## 2023-05-10 ASSESSMENT — PAIN SCALES - GENERAL: PAINLEVEL: NO PAIN (0)

## 2023-05-10 NOTE — PROGRESS NOTES
Infusion Nursing Note:  Justus Lozano presents today for evenity.    Patient seen by provider today: No   present during visit today: Not Applicable.    Note: N/A.      Intravenous Access:  No Intravenous access/labs at this visit.    Treatment Conditions:  Lab Results   Component Value Date     02/16/2022    POTASSIUM 4.0 02/16/2022    CR 0.59 03/08/2023    HANDY 9.3 03/08/2023    BILITOTAL 0.5 09/14/2016    ALBUMIN 3.4 09/14/2016    ALT 18 09/14/2016    AST 13 09/14/2016     Results reviewed, labs MET treatment parameters, ok to proceed with treatment.      Post Infusion Assessment:  Patient tolerated injection without incident.       Discharge Plan:   Discharge instructions reviewed with: Patient.  Patient and/or family verbalized understanding of discharge instructions and all questions answered.  Patient discharged in stable condition accompanied by: self.  Departure Mode: Ambulatory.      Mari Yo RN

## 2023-05-10 NOTE — TELEPHONE ENCOUNTER
Patient called to schedule surgery with Dr. Vizcaino    Date of Surgery: 7/10    Location of surgery: [facility]    Pre-Op H&P: PCP on file    Pre/Post Imaging:  Not Applicable    Post-Op Appt Date: 3 weeks    Surgery Packet Mailed: Yes    Additional comments: LAKESHIA Kennedy on 5/10/2023 at 9:29 AM

## 2023-05-16 ENCOUNTER — OFFICE VISIT (OUTPATIENT)
Dept: AUDIOLOGY | Facility: CLINIC | Age: 68
End: 2023-05-16
Payer: MEDICARE

## 2023-05-16 DIAGNOSIS — H90.3 SENSORY HEARING LOSS, BILATERAL: Primary | ICD-10-CM

## 2023-05-16 PROCEDURE — 99207 PR ASSESSMENT FOR HEARING AID: CPT | Performed by: AUDIOLOGIST

## 2023-05-16 NOTE — PROGRESS NOTES
AUDIOLOGY REPORT    SUBJECTIVE:  Justus Lozano was seen back at the clinic on 5/17/2023 to complete cochlear implant counseling and to review available cochlear implant manufacturers and equipment. Justus was last seen at the clinic 4/13/2023 for initial evaluation to determine candidacy, referred by Machelle Martinez M.D. At that appointment it was determined that Justus is a candidate for cochlear implantation in the right ear. Justus was diagnosed with mild sloping to profound sensorineural hearing loss in the right ear, and a normal sloping to profound sensorineural hearing loss in the left ear.    OBJECTIVE:   Prior to our appointment today, Justus met with Dr. Martinez for a medical consultation and was deemed a candidate.     We reviewed Advanced Bionics, Cochlear, and Med-El devices in detail, including the different wearing options. She met with representatives from all manufacturers prior to our appointment. Justus asked insightful questions about the three brands. After reviewing them, Justus decided she would like to pursue Advanced Bionics. Processor in sand beige, accessories are RogerOn system and TV connector.     ASSESSMENT:   No charge device selection appointment completed today.    PLAN:   Justus is an audiologic candidate for cochlear implantation in the right ear and has chosen to proceed with Advanced Bionics. We will contact Justus to schedule surgery.    Erica Kwok M.A.  Audiology Doctoral Extern  [place] #[id number]    I was present with the patient for the entire Audiology appointment including all procedures/testing performed by the AuD student, and agree with the student s assessment and plan as documented.    Tato Damico, [facility]  Licensed Audiologist  [place] License #[id number]

## 2023-05-23 ENCOUNTER — PREP FOR PROCEDURE (OUTPATIENT)
Dept: OTOLARYNGOLOGY | Facility: CLINIC | Age: 68
End: 2023-05-23
Payer: MEDICARE

## 2023-05-23 NOTE — TELEPHONE ENCOUNTER
Cochlear Implant Selection for 7/10/23 surgery with Alem Vizcaino: right Advanced Revls 3D Ultra Slim J and backup. Zari M90 CI in Sand Beige as externals    Tato Damico, [facility]  Licensed Audiologist  [place] License #[id number]

## 2023-05-23 NOTE — TELEPHONE ENCOUNTER
Placed case mod to add implant selection. Called OR scheduling and updated case    Opal Kennedy, Perioperative Coordinator, ENT 5/23/2023 at 9:05 AM

## 2023-06-05 ENCOUNTER — TELEPHONE (OUTPATIENT)
Dept: INFUSION THERAPY | Facility: CLINIC | Age: 68
End: 2023-06-05
Payer: MEDICARE

## 2023-06-05 NOTE — TELEPHONE ENCOUNTER
Left voicemail message for patient requesting a return call regarding scheduling lab draw and Prolia.  NEW ORDER

## 2023-06-07 ENCOUNTER — MEDICAL CORRESPONDENCE (OUTPATIENT)
Dept: HEALTH INFORMATION MANAGEMENT | Facility: CLINIC | Age: 68
End: 2023-06-07
Payer: MEDICARE

## 2023-06-09 RX ORDER — ALBUTEROL SULFATE 90 UG/1
1-2 AEROSOL, METERED RESPIRATORY (INHALATION)
Status: CANCELLED
Start: 2023-06-09

## 2023-06-09 RX ORDER — DIPHENHYDRAMINE HYDROCHLORIDE 50 MG/ML
50 INJECTION INTRAMUSCULAR; INTRAVENOUS
Status: CANCELLED
Start: 2023-06-09

## 2023-06-09 RX ORDER — MEPERIDINE HYDROCHLORIDE 25 MG/ML
25 INJECTION INTRAMUSCULAR; INTRAVENOUS; SUBCUTANEOUS EVERY 30 MIN PRN
Status: CANCELLED | OUTPATIENT
Start: 2023-06-09

## 2023-06-09 RX ORDER — EPINEPHRINE 1 MG/ML
0.3 INJECTION, SOLUTION INTRAMUSCULAR; SUBCUTANEOUS EVERY 5 MIN PRN
Status: CANCELLED | OUTPATIENT
Start: 2023-06-09

## 2023-06-09 RX ORDER — ALBUTEROL SULFATE 0.83 MG/ML
2.5 SOLUTION RESPIRATORY (INHALATION)
Status: CANCELLED | OUTPATIENT
Start: 2023-06-09

## 2023-06-09 RX ORDER — METHYLPREDNISOLONE SODIUM SUCCINATE 125 MG/2ML
125 INJECTION, POWDER, LYOPHILIZED, FOR SOLUTION INTRAMUSCULAR; INTRAVENOUS
Status: CANCELLED
Start: 2023-06-09

## 2023-06-19 NOTE — TELEPHONE ENCOUNTER
called patient to let her know that we do not choose surgery times and she will get a call within a week of her surgery with her exact start time. Patient understood.    Patient also said she wasn't sure if she was supposed to get her meningitis vaccine before the surgery. Advised patient I would have Dr. Cazares nurse call her with this information    Opal Kennedy, Perioperative Coordinator, ENT 6/19/2023 at 12:25 PM

## 2023-06-19 NOTE — TELEPHONE ENCOUNTER
Maximus, Aurea, RN  P Ent Adult Surgery Scheduling-[facility]  Pt calling in to pre-op call line wanting surgery times.   She states she requested and early appt and was upset that it was currently scheduled at 10:45 am.   She would like an earlier time if possible.   Please reach out to her.   Thank   Maliha WHITMAN   [facility] PreOp Calls

## 2023-06-21 ENCOUNTER — MYC MEDICAL ADVICE (OUTPATIENT)
Dept: AUDIOLOGY | Facility: CLINIC | Age: 68
End: 2023-06-21
Payer: MEDICARE

## 2023-06-22 DIAGNOSIS — Z92.29 PERSONAL HISTORY OF OTHER DRUG THERAPY: ICD-10-CM

## 2023-07-03 ENCOUNTER — TELEPHONE (OUTPATIENT)
Dept: OTOLARYNGOLOGY | Facility: CLINIC | Age: 68
End: 2023-07-03
Payer: MEDICARE

## 2023-07-03 RX ORDER — PREDNISONE 10 MG/1
4 TABLET ORAL 3 TIMES DAILY
COMMUNITY
End: 2023-08-10

## 2023-07-03 NOTE — TELEPHONE ENCOUNTER
Connected with Justus - she's wondering if she can set up addtl hearing aid appt for after she receives with CI on 11/15 because she think she'll need addtl programming for that. Said I would check with Sandy Johnson on return to clinic and get back to her in a few weeks to schedule, if needed. She understood.     -Rubi SANCHEZ 7/3/30

## 2023-07-07 ENCOUNTER — ANESTHESIA EVENT (OUTPATIENT)
Dept: SURGERY | Facility: AMBULATORY SURGERY CENTER | Age: 68
End: 2023-07-07
Payer: MEDICARE

## 2023-07-10 ENCOUNTER — HOSPITAL ENCOUNTER (OUTPATIENT)
Facility: AMBULATORY SURGERY CENTER | Age: 68
Discharge: HOME | End: 2023-07-10
Attending: OTOLARYNGOLOGY
Payer: MEDICARE

## 2023-07-10 ENCOUNTER — ANCILLARY PROCEDURE (OUTPATIENT)
Dept: RADIOLOGY | Facility: AMBULATORY SURGERY CENTER | Age: 68
End: 2023-07-10
Attending: OTOLARYNGOLOGY
Payer: MEDICARE

## 2023-07-10 ENCOUNTER — ANESTHESIA (OUTPATIENT)
Dept: SURGERY | Facility: AMBULATORY SURGERY CENTER | Age: 68
End: 2023-07-10
Payer: MEDICARE

## 2023-07-10 VITALS
RESPIRATION RATE: 14 BRPM | OXYGEN SATURATION: 97 % | SYSTOLIC BLOOD PRESSURE: 135 MMHG | TEMPERATURE: 97.7 F | HEART RATE: 79 BPM | DIASTOLIC BLOOD PRESSURE: 87 MMHG | HEIGHT: 69 IN | WEIGHT: 134 LBS | BODY MASS INDEX: 19.85 KG/M2

## 2023-07-10 DIAGNOSIS — H90.3 SENSORINEURAL HEARING LOSS (SNHL) OF BOTH EARS: ICD-10-CM

## 2023-07-10 DIAGNOSIS — Q18.1 CYST OF EAR CANAL: ICD-10-CM

## 2023-07-10 DIAGNOSIS — G89.18 POSTOPERATIVE PAIN: Primary | ICD-10-CM

## 2023-07-10 PROCEDURE — 69930 IMPLANT COCHLEAR DEVICE: CPT | Mod: RT | Performed by: OTOLARYNGOLOGY

## 2023-07-10 PROCEDURE — 69930 IMPLANT COCHLEAR DEVICE: CPT | Mod: RT

## 2023-07-10 DEVICE — IMPLANTABLE DEVICE
Type: IMPLANTABLE DEVICE | Site: EAR | Status: FUNCTIONAL
Brand: HIRES™ ULTRA 3D CI HIFOCUS™ SLIMJ ELECTRODE

## 2023-07-10 RX ORDER — FENTANYL CITRATE 50 UG/ML
25 INJECTION, SOLUTION INTRAMUSCULAR; INTRAVENOUS EVERY 5 MIN PRN
Status: DISCONTINUED | OUTPATIENT
Start: 2023-07-10 | End: 2023-07-11 | Stop reason: HOSPADM

## 2023-07-10 RX ORDER — HYDROMORPHONE HYDROCHLORIDE 1 MG/ML
0.2 INJECTION, SOLUTION INTRAMUSCULAR; INTRAVENOUS; SUBCUTANEOUS EVERY 5 MIN PRN
Status: DISCONTINUED | OUTPATIENT
Start: 2023-07-10 | End: 2023-07-11 | Stop reason: HOSPADM

## 2023-07-10 RX ORDER — SODIUM CHLORIDE, SODIUM LACTATE, POTASSIUM CHLORIDE, CALCIUM CHLORIDE 600; 310; 30; 20 MG/100ML; MG/100ML; MG/100ML; MG/100ML
INJECTION, SOLUTION INTRAVENOUS CONTINUOUS PRN
Status: DISCONTINUED | OUTPATIENT
Start: 2023-07-10 | End: 2023-07-10

## 2023-07-10 RX ORDER — HYDROMORPHONE HYDROCHLORIDE 1 MG/ML
0.4 INJECTION, SOLUTION INTRAMUSCULAR; INTRAVENOUS; SUBCUTANEOUS EVERY 5 MIN PRN
Status: DISCONTINUED | OUTPATIENT
Start: 2023-07-10 | End: 2023-07-11 | Stop reason: HOSPADM

## 2023-07-10 RX ORDER — HYDROCODONE BITARTRATE AND ACETAMINOPHEN 5; 325 MG/1; MG/1
1 TABLET ORAL
Status: DISCONTINUED | OUTPATIENT
Start: 2023-07-10 | End: 2023-07-11 | Stop reason: HOSPADM

## 2023-07-10 RX ORDER — ONDANSETRON 2 MG/ML
4 INJECTION INTRAMUSCULAR; INTRAVENOUS EVERY 30 MIN PRN
Status: DISCONTINUED | OUTPATIENT
Start: 2023-07-10 | End: 2023-07-11 | Stop reason: HOSPADM

## 2023-07-10 RX ORDER — CEFAZOLIN SODIUM 2 G/50ML
2 SOLUTION INTRAVENOUS SEE ADMIN INSTRUCTIONS
Status: DISCONTINUED | OUTPATIENT
Start: 2023-07-10 | End: 2023-07-10 | Stop reason: HOSPADM

## 2023-07-10 RX ORDER — FENTANYL CITRATE 50 UG/ML
50 INJECTION, SOLUTION INTRAMUSCULAR; INTRAVENOUS EVERY 5 MIN PRN
Status: DISCONTINUED | OUTPATIENT
Start: 2023-07-10 | End: 2023-07-11 | Stop reason: HOSPADM

## 2023-07-10 RX ORDER — HYDROCODONE BITARTRATE AND ACETAMINOPHEN 5; 325 MG/1; MG/1
1 TABLET ORAL EVERY 6 HOURS PRN
Qty: 10 TABLET | Refills: 0 | Status: SHIPPED | OUTPATIENT
Start: 2023-07-10 | End: 2023-07-13

## 2023-07-10 RX ORDER — OXYCODONE HYDROCHLORIDE 5 MG/1
5 TABLET ORAL ONCE
Status: COMPLETED | OUTPATIENT
Start: 2023-07-10 | End: 2023-07-10

## 2023-07-10 RX ORDER — GLYCOPYRROLATE 0.2 MG/ML
INJECTION, SOLUTION INTRAMUSCULAR; INTRAVENOUS PRN
Status: DISCONTINUED | OUTPATIENT
Start: 2023-07-10 | End: 2023-07-10

## 2023-07-10 RX ORDER — PROPOFOL 10 MG/ML
INJECTION, EMULSION INTRAVENOUS CONTINUOUS PRN
Status: DISCONTINUED | OUTPATIENT
Start: 2023-07-10 | End: 2023-07-10

## 2023-07-10 RX ORDER — PROPOFOL 10 MG/ML
INJECTION, EMULSION INTRAVENOUS PRN
Status: DISCONTINUED | OUTPATIENT
Start: 2023-07-10 | End: 2023-07-10

## 2023-07-10 RX ORDER — HYDROMORPHONE HYDROCHLORIDE 4 MG/ML
INJECTION, SOLUTION INTRAMUSCULAR; INTRAVENOUS; SUBCUTANEOUS PRN
Status: DISCONTINUED | OUTPATIENT
Start: 2023-07-10 | End: 2023-07-10

## 2023-07-10 RX ORDER — FENTANYL CITRATE 50 UG/ML
INJECTION, SOLUTION INTRAMUSCULAR; INTRAVENOUS PRN
Status: DISCONTINUED | OUTPATIENT
Start: 2023-07-10 | End: 2023-07-10

## 2023-07-10 RX ORDER — SODIUM CHLORIDE, SODIUM LACTATE, POTASSIUM CHLORIDE, CALCIUM CHLORIDE 600; 310; 30; 20 MG/100ML; MG/100ML; MG/100ML; MG/100ML
INJECTION, SOLUTION INTRAVENOUS CONTINUOUS
Status: DISCONTINUED | OUTPATIENT
Start: 2023-07-10 | End: 2023-07-11 | Stop reason: HOSPADM

## 2023-07-10 RX ORDER — ONDANSETRON 4 MG/1
4 TABLET, ORALLY DISINTEGRATING ORAL EVERY 30 MIN PRN
Status: DISCONTINUED | OUTPATIENT
Start: 2023-07-10 | End: 2023-07-11 | Stop reason: HOSPADM

## 2023-07-10 RX ORDER — ACETAMINOPHEN 325 MG/1
975 TABLET ORAL ONCE
Status: COMPLETED | OUTPATIENT
Start: 2023-07-10 | End: 2023-07-10

## 2023-07-10 RX ORDER — DEXAMETHASONE SODIUM PHOSPHATE 10 MG/ML
10 INJECTION, SOLUTION INTRAMUSCULAR; INTRAVENOUS ONCE
Status: DISCONTINUED | OUTPATIENT
Start: 2023-07-10 | End: 2023-07-10 | Stop reason: HOSPADM

## 2023-07-10 RX ORDER — DEXAMETHASONE SODIUM PHOSPHATE 4 MG/ML
INJECTION, SOLUTION INTRA-ARTICULAR; INTRALESIONAL; INTRAMUSCULAR; INTRAVENOUS; SOFT TISSUE PRN
Status: DISCONTINUED | OUTPATIENT
Start: 2023-07-10 | End: 2023-07-10

## 2023-07-10 RX ORDER — ACETAMINOPHEN 325 MG/1
650 TABLET ORAL
Status: DISCONTINUED | OUTPATIENT
Start: 2023-07-10 | End: 2023-07-11 | Stop reason: HOSPADM

## 2023-07-10 RX ORDER — CEFAZOLIN SODIUM 2 G/50ML
2 SOLUTION INTRAVENOUS
Status: COMPLETED | OUTPATIENT
Start: 2023-07-10 | End: 2023-07-10

## 2023-07-10 RX ADMIN — PROPOFOL 250 MCG/KG/MIN: 10 INJECTION, EMULSION INTRAVENOUS at 13:24

## 2023-07-10 RX ADMIN — GLYCOPYRROLATE 0.2 MG: 0.2 INJECTION, SOLUTION INTRAMUSCULAR; INTRAVENOUS at 12:56

## 2023-07-10 RX ADMIN — CEFAZOLIN SODIUM 2 G: 2 SOLUTION INTRAVENOUS at 12:29

## 2023-07-10 RX ADMIN — SODIUM CHLORIDE, SODIUM LACTATE, POTASSIUM CHLORIDE, CALCIUM CHLORIDE: 600; 310; 30; 20 INJECTION, SOLUTION INTRAVENOUS at 11:53

## 2023-07-10 RX ADMIN — Medication 100 MCG: at 13:45

## 2023-07-10 RX ADMIN — GLYCOPYRROLATE 0.2 MG: 0.2 INJECTION, SOLUTION INTRAMUSCULAR; INTRAVENOUS at 13:45

## 2023-07-10 RX ADMIN — HYDROMORPHONE HYDROCHLORIDE 0.5 MG: 4 INJECTION, SOLUTION INTRAMUSCULAR; INTRAVENOUS; SUBCUTANEOUS at 14:20

## 2023-07-10 RX ADMIN — FENTANYL CITRATE 50 MCG: 50 INJECTION, SOLUTION INTRAMUSCULAR; INTRAVENOUS at 13:31

## 2023-07-10 RX ADMIN — ACETAMINOPHEN 975 MG: 325 TABLET ORAL at 09:44

## 2023-07-10 RX ADMIN — FENTANYL CITRATE 50 MCG: 50 INJECTION, SOLUTION INTRAMUSCULAR; INTRAVENOUS at 13:14

## 2023-07-10 RX ADMIN — FENTANYL CITRATE 50 MCG: 50 INJECTION, SOLUTION INTRAMUSCULAR; INTRAVENOUS at 12:32

## 2023-07-10 RX ADMIN — OXYCODONE HYDROCHLORIDE 5 MG: 5 TABLET ORAL at 15:00

## 2023-07-10 RX ADMIN — OXYCODONE HYDROCHLORIDE 5 MG: 5 TABLET ORAL at 15:41

## 2023-07-10 RX ADMIN — PROPOFOL 150 MG: 10 INJECTION, EMULSION INTRAVENOUS at 12:44

## 2023-07-10 RX ADMIN — PROPOFOL 200 MCG/KG/MIN: 10 INJECTION, EMULSION INTRAVENOUS at 12:44

## 2023-07-10 RX ADMIN — DEXAMETHASONE SODIUM PHOSPHATE 10 MG: 4 INJECTION, SOLUTION INTRA-ARTICULAR; INTRALESIONAL; INTRAMUSCULAR; INTRAVENOUS; SOFT TISSUE at 12:56

## 2023-07-10 NOTE — OR NURSING
Verbal order from Dr. Vizcaino to straight cath if patient unable to void.  Patient attempted once more without success.  Straight cathed for 400cc clear kam urine.  Patient states she feels better and is now comfortable with going home.   updated.

## 2023-07-10 NOTE — ANESTHESIA PROCEDURE NOTES
Airway       Patient location during procedure: OR       Procedure Start/Stop Times: 7/10/2023 12:47 PM  Staff -        Performed By: CRNA  Consent for Airway        Urgency: elective  Indications and Patient Condition       Indications for airway management: laurie-procedural       Induction type:intravenous       Mask difficulty assessment: 1 - vent by mask    Final Airway Details       Final airway type: endotracheal airway       Successful airway: ETT - single  Endotracheal Airway Details        ETT size (mm): 7.0       Cuffed: yes       Cuff volume (mL): 6       Successful intubation technique: direct laryngoscopy       DL Blade Type: Carlson 2       Grade View of Cords: 1       Adjucts: stylet       Position: Right       Measured from: lips       Secured at (cm): 22       Bite block used: None    Post intubation assessment        Placement verified by: capnometry, equal breath sounds and chest rise        Number of attempts at approach: 1       Secured with: pink tape       Ease of procedure: easy       Dentition: Intact and Unchanged    Medication(s) Administered   Medication Administration Time: 7/10/2023 12:47 PM

## 2023-07-10 NOTE — DISCHARGE INSTRUCTIONS
"1. Resume your home medications. Take pain medication, Tylenol or ibuprofen as needed. Use docusate to avoid constipation.     2. Wound care  * You can remove your head dressing tomorrow.     3. No shower until 48 hours after surgery. Keep incision clean and dry, do not scrub, let water and soap run over incision.     4. For the next week, no heavy lifting more than 5 pounds, no strenuous activities. No nose blowing. Sneeze with your mouth open.    5. Please call MD or come to the ED for shortness of breath, trouble breathing, inability to tolerate liquids, intractable dizziness, or signs of infection such as fevers or redness.      Call the [phone] clinic number if you have any questions and concerns during the day and call [phone] at night and ask for \"ENT resident on call\".     6. Follow up with Dr. Vizcaino as previously scheduled.    [facility]  Home Care Following Anesthesia  For 24 hours after surgery:  Get plenty of rest.  A responsible adult must stay with you for at least 24 hours after you leave the surgery center.  Do not drive or use heavy equipment.  If you have weakness or tingling, don't drive or use heavy equipment until this feeling goes away.   Do not drink alcohol.   Avoid strenuous or risky activities.  Ask for help when climbing stairs.  You may feel lightheaded.  IF so, sit for a few minutes before standing.  Have someone help you get up.   If you have nausea (feel sick to your stomach): Drink only clear liquids such as apple juice, ginger ale, broth or 7-Up.  Rest may also help.  Be sure to drink enough fluids.  Move to a regular diet as you feel able.   You may have a slight fever.  Call the doctor if your fever is over 100 F (37.7 C) (taken under the tongue) or lasts longer than 24 hours.  You may have a dry mouth, a sore throat, muscle aches or trouble sleeping. These should go away after 24 hours.  Do not make important or legal decisions.   It " VM rec from Bety HERNANDEZ RN in Triage-Pt left a VM on the Triage line asking for a refill of her Nilotinib (Tasigna). On the original rx from 3/26/18-pt was given 6 refills. I have asked Val Licona at [facility] to proceed with the refill.   "is recommended to avoid smoking.        Today you received a Marcaine or bupivacaine block to numb the nerves near your surgery site.  This is a block using local anesthetic or \"numbing\" medication injected around the nerves to anesthetize or \"numb\" the area supplied by those nerves.  This block is injected into the muscle layer near your surgical site.  The medication may numb the location where you had surgery for 6-18 hours, but may last up to 24 hours.  If your surgical site is an arm or leg you should be careful with your affected limb, since it is possible to injure your limb without being aware of it due to the numbing.  Until full feeling returns, you should guard against bumping or hitting your limb, and avoid extreme hot or cold temperatures on the skin.  As the block wears off, the feeling will return as a tingling or prickly sensation near your surgical site.  You will experience more discomfort from your incision as the feeling returns.  You may want to take a pain pill (a narcotic or Tylenol if this was prescribed by your surgeon) when you start to experience mild pain before the pain beccomes more severe.  If your pain medications do not control your pain you should notifiy your surgeon.    Tips for taking pain medications  To get the best pain relief possible, remember these points:  Take pain medications as directed, before pain becomes severe.  Pain medication can upset your stomach: taking it with food may help.  Constipation is a common side effect of pain medication. Drink plenty of  fluids.  Eat foods high in fiber. Take a stool softener if recommended by your doctor or pharmacist.  Do not drink alcohol, drive or operate machinery while taking pain medications.  Ask about other ways to control pain, such as with heat, ice or relaxation.    Tylenol/Acetaminophen Consumption    If you feel your pain relief is insufficient, you may take Tylenol/Acetaminophen in addition to your narcotic pain " medication.   Be careful not to exceed 4,000 mg of Tylenol/Acetaminophen in a 24 hour period from all sources.  If you are taking extra strength Tylenol/acetaminophen (500 mg), the maximum dose is 8 tablets in 24 hours.  If you are taking regular strength acetaminophen (325 mg), the maximum dose is 12 tablets in 24 hours.    Call a doctor for any of the following:  Signs of infection (fever, growing tenderness at the surgery site, a large amount of drainage or bleeding, severe pain, foul-smelling drainage, redness, swelling).  It has been over 8 to 10 hours since surgery and you are still not able to urinate (pass water).  Headache for over 24 hours.  Signs of Covid-19 infection (temperature over 100 degrees, shortness of breath, cough, loss of taste/smell, generalized body aches, persistent headache, chills, sore throat, nausea/vomiting/diarrhea)  Your doctor is:  Dr. Alem Vizcaino, ENT Otolaryngology: [phone]               Or dial [phone] and ask for the resident on call for:  ENT Otolaryngology  For emergency care, call the:  [facility]:  [phone] (TTY for hearing impaired: [phone])

## 2023-07-10 NOTE — ANESTHESIA CARE TRANSFER NOTE
Patient: Justus Lozano    Procedure: Procedure(s):  INSERTION, IMPLANT, COCHLEAR, WITH INTRAOPERATIVE FACIAL NERVE MONITORING RIGHT  (ADVANCED BIONICS DEVICE)  EXAM UNDER ANESTHESIA- LEFT EAR       Diagnosis: Sensorineural hearing loss (SNHL) of both ears [H90.3]  Cyst of ear canal [Q18.1]  Diagnosis Additional Information: No value filed.    Anesthesia Type:   General     Note:    Oropharynx: oropharynx clear of all foreign objects and spontaneously breathing  Level of Consciousness: awake and drowsy  Oxygen Supplementation: face mask  Level of Supplemental Oxygen (L/min / FiO2): 6  Independent Airway: airway patency satisfactory and stable  Dentition: dentition unchanged  Vital Signs Stable: post-procedure vital signs reviewed and stable  Report to RN Given: handoff report given  Patient transferred to: PACU    Handoff Report: Identifed the Patient, Identified the Reponsible Provider, Reviewed the pertinent medical history, Discussed the surgical course, Reviewed Intra-OP anesthesia mangement and issues during anesthesia, Set expectations for post-procedure period and Allowed opportunity for questions and acknowledgement of understanding      Vitals:  Vitals Value Taken Time   /86 07/10/23 1446   Temp 36.5  C (97.7  F) 07/10/23 1446   Pulse 93 07/10/23 1454   Resp 13 07/10/23 1454   SpO2 94 % 07/10/23 1454   Vitals shown include unvalidated device data.    Electronically Signed By: DAMIR Warren CRNA  July 10, 2023  2:55 PM

## 2023-07-10 NOTE — ANESTHESIA POSTPROCEDURE EVALUATION
Patient: Justus Lozano    Procedure: Procedure(s):  INSERTION, IMPLANT, COCHLEAR, WITH INTRAOPERATIVE FACIAL NERVE MONITORING RIGHT  (ADVANCED BIONICS DEVICE)  EXAM UNDER ANESTHESIA- LEFT EAR       Anesthesia Type:  General    Note:  Disposition: Outpatient   Postop Pain Control: Uneventful            Sign Out: Well controlled pain   PONV: No   Neuro/Psych: Uneventful            Sign Out: Acceptable/Baseline neuro status   Airway/Respiratory: Uneventful            Sign Out: Acceptable/Baseline resp. status   CV/Hemodynamics: Uneventful            Sign Out: Acceptable CV status; No obvious hypovolemia; No obvious fluid overload   Other NRE: NONE   DID A NON-ROUTINE EVENT OCCUR? No           Last vitals:  Vitals Value Taken Time   /91 07/10/23 1506   Temp 36.5  C (97.7  F) 07/10/23 1506   Pulse 85 07/10/23 1507   Resp 11 07/10/23 1507   SpO2 95 % 07/10/23 1507   Vitals shown include unvalidated device data.    Electronically Signed By: Usha Mariee MD  July 10, 2023  4:17 PM

## 2023-07-10 NOTE — BRIEF OP NOTE
[facility] Brief Operative Note    Pre-operative diagnosis: Sensorineural hearing loss (SNHL) of both ears [H90.3]  Cyst of ear canal [Q18.1]   Post-operative diagnosis Same   Procedure: Procedure(s):  INSERTION, IMPLANT, COCHLEAR, WITH INTRAOPERATIVE FACIAL NERVE MONITORING RIGHT  (ADVANCED BIONICS DEVICE)  EXAM UNDER ANESTHESIA- LEFT EAR   Surgeon(s): Surgeon(s) and Role:     * Alem Vizcaino MD - Primary     * Yvonne Duque MD - Resident - Assisting     * Ayla Ge MD - Fellow - Assisting   Estimated blood loss: Minimal    Specimens: * No specimens in log *   Findings: Easy insertion; no resistance  RIGHT CI: Advanced Bionics, Bookatable (Livebookings) Ultra 3D CI HiFocus SlimJ Electrode; REF [id number]; SN: [id number]

## 2023-07-10 NOTE — ANESTHESIA PREPROCEDURE EVALUATION
Anesthesia Pre-Procedure Evaluation    Patient: Justus Lozano   MRN: [id number] : 1955        Procedure : Procedure(s):  INSERTION, IMPLANT, COCHLEAR, WITH INTRAOPERATIVE FACIAL NERVE MONITORING RIGHT  (ADVANCED BIONICS DEVICE)  EXCISION, LESION, EAR CANAL LEFT          Past Medical History:   Diagnosis Date     Arthritis     bilateral thumbs     Disorder of bone and cartilage, unspecified      Hearing loss     BILAT HEARING AIDS     Hernia, inguinal      Insomnia      Metrorrhagia      Migraine, unspecified, without mention of intractable migraine without mention of status migrainosus      Osteoporosis 2022     Sensorineural hearing loss      Thoracic or lumbosacral neuritis or radiculitis, unspecified      Tinnitus      Urticaria, unspecified      Viral warts, unspecified       Past Surgical History:   Procedure Laterality Date     ABDOMEN SURGERY  2014    Robotic hysterectomy     ABDOMINAL SACROCOLPOPEXY       HC REMOVE TONSILS/ADENOIDS,12+ Y/O  10 TH GRADE     LAPAROSCOPIC HERNIORRHAPHY FEMORAL Bilateral 2015    Procedure: LAPAROSCOPIC HERNIORRHAPHY FEMORAL;  Surgeon: Tian Mata MD;  Location:  [facility]     OPEN REDUCTION INTERNAL FIXATION WRIST Right 2022    Procedure: OPEN REDUCTION INTERNAL FIXATION, OF DISTAL RADIUS FRACTURE;  Surgeon: Damien Flores MD;  Location:  OR     ORTHOPEDIC SURGERY  2015, , 2-    Thumb repairs and fracturesd wrist     PERINEOPLASTY       RECTOCELE REPAIR       ELISA BSO        Allergies   Allergen Reactions     Celebrex [Celecoxib] Hives     Sulfamethoxazole Hives      Social History     Tobacco Use     Smoking status: Never     Smokeless tobacco: Never   Substance Use Topics     Alcohol use: Never      Wt Readings from Last 1 Encounters:   07/10/23 60.8 kg (134 lb)        Anesthesia Evaluation   Pt has had prior anesthetic. Type: General and MAC.        ROS/MED HX  ENT/Pulmonary:  - neg pulmonary ROS      Neurologic:  - neg neurologic ROS     Cardiovascular:  - neg cardiovascular ROS     METS/Exercise Tolerance: >4 METS    Hematologic:  - neg hematologic  ROS     Musculoskeletal:  - neg musculoskeletal ROS     GI/Hepatic:  - neg GI/hepatic ROS     Renal/Genitourinary:  - neg Renal ROS     Endo:  - neg endo ROS     Psychiatric/Substance Use:  - neg psychiatric ROS     Infectious Disease:  - neg infectious disease ROS     Malignancy:  - neg malignancy ROS     Other:  - neg other ROS          Physical Exam    Airway        Mallampati: I   TM distance: > 3 FB   Neck ROM: full   Mouth opening: > 3 cm    Respiratory Devices and Support         Dental       (+) Completely normal teeth      Cardiovascular   cardiovascular exam normal          Pulmonary   pulmonary exam normal                OUTSIDE LABS:  CBC:   Lab Results   Component Value Date    WBC 6.9 02/16/2022    WBC 6.8 02/16/2022    HGB 12.8 02/19/2022    HGB 13.2 02/16/2022    HCT 40.2 02/16/2022    HCT 34.7 (L) 02/16/2022     02/16/2022     02/16/2022     BMP:   Lab Results   Component Value Date     02/16/2022     02/15/2022    POTASSIUM 4.0 02/16/2022    POTASSIUM 4.2 02/15/2022    CHLORIDE 102 02/16/2022    CHLORIDE 105 02/15/2022    CO2 26 02/16/2022    CO2 22 02/15/2022    BUN 8 02/16/2022    BUN 12 02/15/2022    CR 0.59 03/08/2023    CR 0.49 (L) 09/28/2022     (H) 02/19/2022     (H) 02/16/2022     COAGS:   Lab Results   Component Value Date    INR 1.08 02/15/2022     POC: No results found for: BGM, HCG, HCGS  HEPATIC:   Lab Results   Component Value Date    ALBUMIN 3.4 09/14/2016    PROTTOTAL 6.7 (L) 09/14/2016    ALT 18 09/14/2016    AST 13 09/14/2016    ALKPHOS 55 09/14/2016    BILITOTAL 0.5 09/14/2016     OTHER:   Lab Results   Component Value Date    LACT 1.9 09/14/2016    HANDY 9.3 03/08/2023    LIPASE 125 09/14/2016       Anesthesia Plan    ASA Status:  2      Anesthesia Type: General.     - Airway: ETT    Induction: Intravenous.   Maintenance: TIVA.        Consents    Anesthesia Plan(s) and associated risks, benefits, and realistic alternatives discussed. Questions answered and patient/representative(s) expressed understanding.    - Discussed:     - Discussed with:  Patient         Postoperative Care    Pain management: IV analgesics.   PONV prophylaxis: Ondansetron (or other 5HT-3)     Comments:                Jaime Carranza MD

## 2023-07-10 NOTE — OR NURSING
Unable to urinate and concerned due to history of post op urinary retention.  Dr. Vizcaino paged. Awaiting directions.

## 2023-07-11 PROBLEM — Z96.21 COCHLEAR IMPLANT IN PLACE: Status: ACTIVE | Noted: 2023-07-11

## 2023-07-11 NOTE — OP NOTE
Date of service:  7/10/23    Preoperative diagnosis:  Bilateral sensorineural hearing loss     Postoperative diagnosis:  Bilateral sensorineural hearing loss     Procedure:    1.  Right cochlear implantation with the Advanced Bionics SlimJ device  2.  Facial nerve monitoring x 2 hours    Surgeon:  Alem Vizcaino MD    Fellow:  Ayla Ge MD    Resident:  Yvonne Elena MD    Anesthesia:  General    EBL:  20cc    Specimens:  None    Complications:  None    Findings:  Full insertion completed without resistance.    Indications: Justus Lozano has a history of bilateral profound sensorineural hearing loss. She was evaluated and found to be a good cochlear implant candidate. The risks and benefits were discussed with the patient and informed consent was obtained.     Procedure: The patient was taken to the operating room, placed supine on the operating room table and general anesthesia was induced. The patient was orally intubated without incident.  Time Out was then performed with confirmation of the patient, site and procedure.   Facial nerve electrodes were placed on the right face and connected to the nerve monitor. Impedances were checked and the nerve was monitored for the entirety of the case. 1:100,000 epinephrine was injected in the postauricular region and the area was prepped and draped in standard surgical fashion. S-shaped postauricular incision was made and carried down to the temporalis fascia. Palva flap was created. A pocket was made under the temporalis muscle and a well drilled for the  stimulator. 2-0 Prolene was sutured to the temporalis periosteum superiorly and sutured to the periosteum inferiorly. A simple mastoidectomy was performed with identification of the tegmen, sigmoid sinus and antrum. Once the antrum was identified, the operating microscope was brought into position and used for the remainder of the case. The short process of the incus was identified and the facial nerve  in its descending portion was identified. The facial recess was opened and the chorda tympani was preserved. The round window niche was identified. A trough was drilled from the well to the mastoid. The entire area was irrigated with saline. The round window niche was drilled off to expose the round window membrane. The device was passed onto the field and the  stimulator placed into the well under the temporalis and tied into position. The round window was opened with a pick and round window rasp taking care not to suction within the cochlea. The electrode was then passed into the cochlea. No resistance was felt and full insertion was performed. The round window was packed with fascia and the electrode coiled into the mastoid cavity. The Palva flap was closed over the electrode and the skin closed in multiple layers. Intraoperative skull x-ray was done which showed a coil within the otic capsule. A George dressing was placed and the facial nerve electrodes removed. The patient tolerated the procedure well and there were no complications. She was returned to anesthesia, awakened and taken to the PACU in stable condition.    Alem OBREGON MD, was scrubbed during the entire procedure.

## 2023-07-12 ENCOUNTER — TELEPHONE (OUTPATIENT)
Dept: OTOLARYNGOLOGY | Facility: CLINIC | Age: 68
End: 2023-07-12
Payer: MEDICARE

## 2023-07-12 NOTE — TELEPHONE ENCOUNTER
[facility] Call Center    Phone Message    May a detailed message be left on voicemail: yes     Reason for Call: Other: Patient would like someone from her care team to give her a calll she has a list of 4 to 6 post surgery querstions she would like the answers to right away. Would not discuss them with this writer. Please reach out to patient. Thank you     Action Taken: Message routed to:  [facility]    Travel Screening: Not Applicable

## 2023-07-14 ENCOUNTER — TELEPHONE (OUTPATIENT)
Dept: OTOLARYNGOLOGY | Facility: CLINIC | Age: 68
End: 2023-07-14
Payer: MEDICARE

## 2023-07-14 NOTE — TELEPHONE ENCOUNTER
[facility] Call Center    Phone Message    May a detailed message be left on voicemail: yes     Reason for Call: Other: Patient is requesting a call back cochlear implant. Please reach back out to her to discuss. Thank you.     Action Taken: Message routed to:  [facility] ENT    Travel Screening: Not Applicable

## 2023-07-14 NOTE — TELEPHONE ENCOUNTER
[facility] Call Center    Phone Message    May a detailed message be left on voicemail: yes     Reason for Call: Other: Other: Pt had missed call that never rang and VM did not come through (Pt having phone issues). Please call pt back at your convenience or possibly leave CB number in notes if you miss pt again so writer can give to pt. Thank you!      Action Taken: Message routed to:  [facility] ([facility] ENT    Travel Screening: Not Applicable

## 2023-07-25 ENCOUNTER — LAB (OUTPATIENT)
Dept: INFUSION THERAPY | Facility: CLINIC | Age: 68
End: 2023-07-25
Attending: SPECIALIST
Payer: MEDICARE

## 2023-07-25 VITALS
TEMPERATURE: 98.4 F | SYSTOLIC BLOOD PRESSURE: 138 MMHG | RESPIRATION RATE: 16 BRPM | HEART RATE: 79 BPM | WEIGHT: 135 LBS | HEIGHT: 69 IN | DIASTOLIC BLOOD PRESSURE: 74 MMHG | BODY MASS INDEX: 19.99 KG/M2

## 2023-07-25 DIAGNOSIS — Z92.29 PERSONAL HISTORY OF OTHER DRUG THERAPY: ICD-10-CM

## 2023-07-25 DIAGNOSIS — M81.0 SENILE OSTEOPOROSIS: Primary | ICD-10-CM

## 2023-07-25 PROCEDURE — 96372 THER/PROPH/DIAG INJ SC/IM: CPT | Performed by: SPECIALIST

## 2023-07-25 PROCEDURE — 250N000011 HC RX IP 250 OP 636: Mod: JZ | Performed by: SPECIALIST

## 2023-07-25 RX ORDER — METHYLPREDNISOLONE SODIUM SUCCINATE 125 MG/2ML
125 INJECTION, POWDER, LYOPHILIZED, FOR SOLUTION INTRAMUSCULAR; INTRAVENOUS
Status: CANCELLED
Start: 2024-01-21

## 2023-07-25 RX ORDER — ALBUTEROL SULFATE 90 UG/1
1-2 AEROSOL, METERED RESPIRATORY (INHALATION)
Status: CANCELLED
Start: 2024-01-21

## 2023-07-25 RX ORDER — EPINEPHRINE 1 MG/ML
0.3 INJECTION, SOLUTION INTRAMUSCULAR; SUBCUTANEOUS EVERY 5 MIN PRN
Status: CANCELLED | OUTPATIENT
Start: 2024-01-21

## 2023-07-25 RX ORDER — MEPERIDINE HYDROCHLORIDE 25 MG/ML
25 INJECTION INTRAMUSCULAR; INTRAVENOUS; SUBCUTANEOUS EVERY 30 MIN PRN
Status: CANCELLED | OUTPATIENT
Start: 2024-01-21

## 2023-07-25 RX ORDER — ALBUTEROL SULFATE 0.83 MG/ML
2.5 SOLUTION RESPIRATORY (INHALATION)
Status: CANCELLED | OUTPATIENT
Start: 2024-01-21

## 2023-07-25 RX ORDER — DIPHENHYDRAMINE HYDROCHLORIDE 50 MG/ML
50 INJECTION INTRAMUSCULAR; INTRAVENOUS
Status: CANCELLED
Start: 2024-01-21

## 2023-07-25 RX ADMIN — DENOSUMAB 60 MG: 60 INJECTION SUBCUTANEOUS at 14:09

## 2023-07-25 ASSESSMENT — PAIN SCALES - GENERAL: PAINLEVEL: NO PAIN (0)

## 2023-07-25 NOTE — PROGRESS NOTES
Infusion Nursing Note:  Justus Lozano presents today for prolia injection.    Patient seen by provider today: No   present during visit today: Not Applicable.    Note: N/A.      Intravenous Access:  No Intravenous access/labs at this visit.    Treatment Conditions:  Lab Results   Component Value Date     02/16/2022    POTASSIUM 4.0 02/16/2022    CR 0.59 03/08/2023    HANDY 9.3 03/08/2023    BILITOTAL 0.5 09/14/2016    ALBUMIN 3.4 09/14/2016    ALT 18 09/14/2016    AST 13 09/14/2016       Results reviewed, labs MET treatment parameters, ok to proceed with treatment.      Post Infusion Assessment:  Patient tolerated infusion without incident.       Discharge Plan:   Patient declined prescription refills.  Discharge instructions reviewed with: Patient.  Patient and/or family verbalized understanding of discharge instructions and all questions answered.  AVS to patient via [employer].  Patient will return 6 months for next appointment.   Patient discharged in stable condition accompanied by: self.  Departure Mode: Ambulatory.      Patricia Lees RN

## 2023-08-10 ENCOUNTER — OFFICE VISIT (OUTPATIENT)
Dept: AUDIOLOGY | Facility: CLINIC | Age: 68
End: 2023-08-10
Payer: MEDICARE

## 2023-08-10 ENCOUNTER — OFFICE VISIT (OUTPATIENT)
Dept: OTOLARYNGOLOGY | Facility: CLINIC | Age: 68
End: 2023-08-10
Payer: MEDICARE

## 2023-08-10 VITALS
WEIGHT: 135 LBS | HEIGHT: 68 IN | DIASTOLIC BLOOD PRESSURE: 77 MMHG | SYSTOLIC BLOOD PRESSURE: 152 MMHG | HEART RATE: 83 BPM | TEMPERATURE: 97.4 F | BODY MASS INDEX: 20.46 KG/M2 | OXYGEN SATURATION: 100 %

## 2023-08-10 DIAGNOSIS — H90.3 SENSORY HEARING LOSS, BILATERAL: Primary | ICD-10-CM

## 2023-08-10 DIAGNOSIS — H90.3 SENSORINEURAL HEARING LOSS (SNHL) OF BOTH EARS: Primary | ICD-10-CM

## 2023-08-10 PROCEDURE — 92603 COCHLEAR IMPLT F/UP EXAM 7/>: CPT | Mod: RT | Performed by: AUDIOLOGIST

## 2023-08-10 PROCEDURE — 99024 POSTOP FOLLOW-UP VISIT: CPT | Performed by: OTOLARYNGOLOGY

## 2023-08-10 ASSESSMENT — PAIN SCALES - GENERAL: PAINLEVEL: NO PAIN (0)

## 2023-08-10 NOTE — Clinical Note
8/10/2023       RE: Justus Lozano  [address] [id number]     Dear Colleague,    Thank you for referring your patient, Justus Lozano, to the [facility] [place] at [facility]. Please see a copy of my visit note below.    No notes on file    Again, thank you for allowing me to participate in the care of your patient.      Sincerely,    Alem Vizcaino MD

## 2023-08-10 NOTE — PATIENT INSTRUCTIONS
1. You were seen in the ENT Clinic today by Dr. Vizcaino.  If you have any questions or concerns after your appointment, please call   - Option 1: ENT Clinic: [phone]   - Option 2: Anita (Dr. Vizcaino's Nurse): [phone]          Lamar (Dr. Vizcaino's Nurse): [phone]     2.   Plan to return to clinic as needed    How to Contact Us:  Send a Fixed [employer] Parking Tickets message to your provider. Our team will respond to you via Fixed [employer] Parking Tickets. Occasionally, we will need to call you to get further information.  For urgent matters (Monday-Friday), call the ENT Clinic: [phone] and speak with a call center team member - they will route your call appropriately.   If you'd like to speak directly with a nurse, please find our contact information below. We do our best to check voicemail frequently throughout the day, and will work to call you back within 1-2 days. For urgent matters, please use the general clinic phone numbers listed above.        Anita FELIX LPN  [facility] - Otolaryngology

## 2023-08-10 NOTE — PROGRESS NOTES
AUDIOLOGY REPORT    SUBJECTIVE: Justus Lozano is a 67 year old adult was seen in the Audiology Clinic at [facility] on 8/10/23. Alem Vizcaino M.D. implanted Justus with a right [employer] HiRes Ultra 3D slim J cochlear implant (CI) on 7/10/2023 due to mild to profound sensorineural hearing loss and lack of benefit from hearing aids.    The patient reports aural fullness in her right ear post surgery. The patient reports some imbalance that has been improving. The patient also reports hearing hissing in the right ear.    OBJECTIVE: Alem Vizcaino M.D., cochlear implant surgeon, ordered today's appointment. The patient came to the clinic for adjustment to the programs in the external speech processor and for assessment of the external components of the cochlear implant system. These components provide power and data to the internal device. Sound is only heard once the external portion is activated. Postoperative treatment, including device fitting and adjustment, audiologic assessments, and training are required at regular intervals. Testing can include electropsychophysical measures of threshold, comfort, and loudness balancing, which are completed to update the program.    Processor type: Zari CI M90 with T-shanell  Headpiece type: UHP  Magnet strength: 2    TEST RESULTS:   Tympanograms: Did not test  Unaided Thresholds: normal to profound sensorineural hearing loss in the left ear, and moderately severe to profound sensorineural hearing loss in the right ear.  Electrode Impedances: within normal limits  Datalogging: N/A hours of use per day  Neural Response Testing: Did not test  Facial Stimulation: Absent  Tinnitus: Present  Balance Problems: Absent  Pain/Discomfort: Absent  Strategies Tried: tried [place]-P and [place]-S  Strategy Preference: Patient will try both strategies for 1 week.    Programs:  1. Autosense (HiRes Optima-S)  2. Calm (HiRes Optima-P)    Number of  Channels per Program: 14, electrodes 15 and 16 were disabled do to non auditory stimulation.  M levels were set using loudness scaling. Patient had difficultly with task and M levels are set lower than typical at activation. In live voice she reported that she was not hearing speech and only hearing robotic sounds. We discussed that this is normal and it will take time for her to adjust. We also discussed realistic expectations around the hearing aid. She reported she would like to be fit with her new hearing aid sooner than 3 months as they allow that at other clinics. We discussed that it is our clinic policy not to fit a new hearing aid right away and to discontinue contralateral hearing aid use completely in the first three months. She reported understanding.    We reviewed Justus's equipment with her, how to change batteries, how to place implant on head. Justus's cochlear implant was connected to her destinee on her phone and features of the destinee were reviewed.    ASSESSMENT:    Right cochlear implant activation today.    PLAN: Justus was seen for cochlear implant programming today. She will return in 1 week or sooner if concerns arise. Please call this clinic with questions regarding these results or recommendations.    JAYASHREE Pretty.  Audiology Doctoral Student  [place] #[id number]        I was present with the patient for the entire Audiology appointment including all procedures/testing performed by the AuD student, and agree with the student s assessment and plan as documented.      Tato Damico, [facility]  Licensed Audiologist  [place] License #[id number]        .

## 2023-08-10 NOTE — NURSING NOTE
"Chief Complaint   Patient presents with    RECHECK     Post op   .  Blood pressure (!) 152/77, pulse 83, temperature 97.4  F (36.3  C), height 1.727 m (5' 8\"), weight 61.2 kg (135 lb), SpO2 100 %.    Arnoldo Hurst LPN    "

## 2023-08-18 ENCOUNTER — OFFICE VISIT (OUTPATIENT)
Dept: AUDIOLOGY | Facility: CLINIC | Age: 68
End: 2023-08-18
Payer: MEDICARE

## 2023-08-18 DIAGNOSIS — H90.3 SENSORY HEARING LOSS, BILATERAL: Primary | ICD-10-CM

## 2023-08-18 NOTE — PROGRESS NOTES
"AUDIOLOGY REPORT    SUBJECTIVE: Justus Lozano is a 67 year old adult was seen in the Audiology Clinic at [facility] on 8/18/23. Alem Vizcaino M.D. implanted Justus with a right [employer] HiRes Ultra 3D slim J cochlear implant (CI) on 7/10/2023 due to mild to profound sensorineural hearing loss and lack of benefit from hearing aids.    The patient reports that she has been wearing the processor consistently without her hearing aid and that she has been practicing using some of the listening apps. She mostly wore Optima S, however did try [place] P which she reported was sharper but more clear but she prefers Wasta S. In Wasta S she reported that she hears a \"harmonica sound\" above speech but is hearing soft environmental sounds.    OBJECTIVE: Alem Vizcaino M.D., cochlear implant surgeon, ordered today's appointment. The patient came to the clinic for adjustment to the programs in the external speech processor and for assessment of the external components of the cochlear implant system. These components provide power and data to the internal device. Sound is only heard once the external portion is activated. Postoperative treatment, including device fitting and adjustment, audiologic assessments, and training are required at regular intervals. Testing can include electropsychophysical measures of threshold, comfort, and loudness balancing, which are completed to update the program.    Processor type: Zari CI M90 with T-shanell  Headpiece type: UHP  Magnet strength: 2    TEST RESULTS:   Tympanograms: Did not test  Unaided Thresholds: normal to profound sensorineural hearing loss in the left ear, and moderately severe to profound sensorineural hearing loss in the right ear.  Electrode Impedances: within normal limits  Datalogging: N/A hours of use per day  Neural Response Testing: Did not test  Facial Stimulation: Absent  Tinnitus: Present  Balance Problems: " Absent  Pain/Discomfort: Absent  Strategies Tried: tried [place]-P and [place]-S  Strategy Preference: [place] S    Programs:  1. Autosense (HiRes Optima-S)    Number of Channels per Program: 13 (Electrodes 14-16 disabled due to non auditory stimulation and abnormal loudness growth)    M levels were measured using loudness scaling. Patient had difficulty with task but was able to complete. M levels increased significantly across the array and in live voice reported my voice was too soft therefore I increased M's by 4% which she reported was better. We reviewed realistic expectations.      ASSESSMENT:    Right cochlear implant activation today.    PLAN: Justus was seen for cochlear implant programming today. She will return in 3 weeks or sooner if concerns arise. Please call this clinic with questions regarding these results or recommendations.      Tato Damico, [facility]  Licensed Audiologist  [place] License #[id number]        .

## 2023-08-30 NOTE — PROGRESS NOTES
Justus Lozano is here for a postoperative visit after right cochlear implantation.  There were no complications during surgery and an expected postoperative course.  There is no dizziness.  No pain.    Physical examination:  Incision healing appropriately.  No erythema, edema or fluctuance.  Facial function intact.  There is no hemotympanum.    Assessment and plan:  Healing as expected after cochlear implantation. Cleared for processor programming.  Follow-up as scheduled with audiology.

## 2023-09-05 DIAGNOSIS — H90.3 SENSORINEURAL HEARING LOSS (SNHL) OF BOTH EARS: Primary | ICD-10-CM

## 2023-09-06 ENCOUNTER — OFFICE VISIT (OUTPATIENT)
Dept: AUDIOLOGY | Facility: CLINIC | Age: 68
End: 2023-09-06
Payer: MEDICARE

## 2023-09-06 ENCOUNTER — THERAPY VISIT (OUTPATIENT)
Dept: AUDIOLOGY | Facility: CLINIC | Age: 68
End: 2023-09-06
Payer: MEDICARE

## 2023-09-06 DIAGNOSIS — H90.3 SENSORY HEARING LOSS, BILATERAL: Primary | ICD-10-CM

## 2023-09-06 DIAGNOSIS — H90.3 SENSORINEURAL HEARING LOSS (SNHL) OF BOTH EARS: ICD-10-CM

## 2023-09-06 PROCEDURE — 92523 SPEECH SOUND LANG COMPREHEN: CPT | Mod: GN | Performed by: SPEECH-LANGUAGE PATHOLOGIST

## 2023-09-06 PROCEDURE — 92604 REPROGRAM COCHLEAR IMPLT 7/>: CPT | Mod: RT | Performed by: AUDIOLOGIST

## 2023-09-07 NOTE — PROGRESS NOTES
"SPEECH LANGUAGE PATHOLOGY EVALUATION    Subjective      Presenting condition or subjective complaint:    Date of onset: 09/06/23    Relevant medical history:   Bilateral sensorineural hearing loss, cochlear implant    Patient goals for therapy:  Increase ability to understand using cochlear implant    Objective     Age hearing loss identified: Childhood  Etiology of hearing loss: Unknown, she reports ear infections in childhood as the potential cause  Age of amplification: 35 years old in the right ear and 43 years old in the left  Description of current communication method/issues: Justus feels she is doing well overall with her cochlear implant. She is wearing it full time and doing listening practice. She has used apps and programs on the computer to practice. She has also tried audiobooks. She is not wearing her contralateral hearing aid. She reported she still hears a \"harmonica\" sound and that some speech sounds \"breathy\".   Implant history: Alem Vizcaino M.D. implanted Justus with a right [employer] HiRes Ultra 3D slim J cochlear implant (CI) on 7/10/2023 due to mild to profound sensorineural hearing loss and lack of benefit from hearing aids.  Initial activation took place on 8/10/2023.     Previous Treatment: None    Nucleus screen  Screen D: Repeat common sentences read to him/her without visual print      Total identified words: 31/35     Percent accuracy: 89%    Lexical Neighborhood Test:     Words correct: 11/24     Percent Accuracy: 46%    Multisyllabic Lexical Neighborhood Test:     Words correct: 12/24     Percent accuracy: 50%    Speech intelligibility rating (Percentage as judged by speech pathologist): 100%  Voice: Healthy vocal quality    Assessment & Plan   CLINICAL IMPRESSIONS   Medical Diagnosis: Bilateral sensorineural hearing loss    Treatment Diagnosis: Justus presents with a decreased ability to understand and comprehend spoken language with her cochlear implant alone "   Impression/Assessment: Justus presents with a significantly decreased ability to identify and comprehend spoken language secondary to her sensorineural hearing loss while wearing her cochlear implant alone. It is recommended that she receive aural rehabilitation therapy as this is medically necessary in order to maximize the benefit she receives from her cochlear implant without visual information. Therapy will focus on functional auditory comprehension and communication.    PLAN OF CARE  Treatment Interventions: Aural rehab/auditory training    Prognosis to achieve stated therapy goals is excellent   Rehab potential is impacted by: current level of function, patient motivation    Long Term Goals   SLP Goal 1  Goal Identifier: STG  Goal Description: Justus will identify LMH sounds with 100% per SLP data.  Rationale: To maximize functional communication within the home or community;To maximize language comprehension for interaction with caregivers or the environment  Target Date: 12/05/23  SLP Goal 2  Goal Identifier: STG  Goal Description: Justus will identify 1-2 syllables words from an open set in quiet and in the presence of background noise with 80% accuracy per SLP data.  Rationale: To maximize functional communication within the home or community;To maximize language comprehension for interaction with caregivers or the environment  Target Date: 12/05/23  SLP Goal 3  Goal Identifier: STG  Goal Description: Justus will identify sentenes from an open set and in the presence of background noise with 80% accuracy per SLP data.  Rationale: To maximize functional communication within the home or community;To maximize language comprehension for interaction with caregivers or the environment  Target Date: 12/05/23  SLP Goal 4  Goal Identifier: STG  Goal Description: Justus will identify minimal pairs words with 80% accuracy per SLP data.  Rationale: To maximize functional communication within the home or community;To maximize  language comprehension for interaction with caregivers or the environment  Target Date: 12/05/23    Frequency of Treatment: 1-2x per month  Duration of Treatment: 90 days     Education Assessment:   Learner/Method: Patient  Education Comments: Sent home handout with listening exercises    Risks and benefits of evaluation/treatment have been explained.   Patient/Family/caregiver agrees with Plan of Care.     Evaluation Time:    Sound production with lang comprehension and expression minutes (76855): 50     Present: Not applicable     Signing Clinician: Shantel Gavin, KOMAL    [facility]                                                                                   OUTPATIENT SPEECH LANGUAGE PATHOLOGY      PLAN OF TREATMENT FOR OUTPATIENT REHABILITATION   Patient's Last Name, First Name, Justus Meyers YOB: 1955   Provider's Name   [facility]   Medical Record No.  [id number]     Onset Date: 09/06/23 Start of Care Date: 09/06/23     Medical Diagnosis:  Bilateral sensorineural hearing loss      SLP Treatment Diagnosis: Justus presents with a decreased ability to understand and comprehend spoken language with her cochlear implant alone  Plan of Treatment  Frequency/Duration: 1-2x per month  / 90 days     Certification date from 09/06/23   To 12/05/23          See note for plan of treatment details and functional goals     Shantel Gavin, SLP                         I CERTIFY THE NEED FOR THESE SERVICES FURNISHED UNDER        THIS PLAN OF TREATMENT AND WHILE UNDER MY CARE     (Physician attestation of this document indicates review and certification of the therapy plan).                Referring Provider:  Dr. Alem Vizcaino      Initial Assessment  See Epic Evaluation- 09/06/23

## 2023-09-07 NOTE — PROGRESS NOTES
"AUDIOLOGY REPORT    SUBJECTIVE: Justus Lozano is a 67 year old adult was seen in the Audiology Clinic at [facility] on 9/06/23. Alem Vizcaino M.D. implanted Justus with a right [employer] HiRes Ultra 3D slim J cochlear implant (CI) on 7/10/2023 due to mild to profound sensorineural hearing loss and lack of benefit from hearing aids.    The patient reports that she feels things are going well with the implant. She is wearing it full time and doing listening practice. She is not wearing her contralateral hearing aid. She reported she still hears a \"harmonica\" sound and that some speech sounds \"breathy\".    OBJECTIVE: Alem Vizcaino M.D., cochlear implant surgeon, ordered today's appointment. The patient came to the clinic for adjustment to the programs in the external speech processor and for assessment of the external components of the cochlear implant system. These components provide power and data to the internal device. Sound is only heard once the external portion is activated. Postoperative treatment, including device fitting and adjustment, audiologic assessments, and training are required at regular intervals. Testing can include electropsychophysical measures of threshold, comfort, and loudness balancing, which are completed to update the program.    Processor type: Zari CI M90 with T-shanell  Headpiece type: UHP  Magnet strength: 2    TEST RESULTS:   Tympanograms: Did not test  Unaided Thresholds: Not tested today  Electrode Impedances: within normal limits  Datalogging: N/A hours of use per day  Neural Response Testing: Did not test  Facial Stimulation: Absent  Tinnitus: Present  Balance Problems: Absent  Pain/Discomfort: Absent  Strategies Tried: tried [place]-P and [place]-S  Strategy Preference: [place] S    Programs:  1. Autosense (HiRes Optima-S)    Number of Channels per Program: 13 (Electrodes 14-16 disabled due to non auditory stimulation and abnormal " loudness growth)    M levels were measured using loudness scaling. Patient had less difficulty with task today after I increased M's until she reported the volume was too loud and then came back down. M levels increased slightly across the array. I balanced M levels across the array. In live voice she reported the volume was a little on the loud side but the harmonica sound quality was a lot better but still slightly there. We discussed that this may not go away for awhile. She reported she would like the option to have progressive levels still.    We reviewed the TV Connect and how to adjust the volume when in use.    She would like to order a Phonak Zari Link hearing aid to pair with her processor.    The hearing aid(s) mutually chosen were:  Left: Phonak Zari Link M  COLOR: [place]  BATTERY SIZE: 13  EARMOLD/TIPS: Slim tip silicone with thin tube size 2    Otoscopy revealed ears are clear of cerumen bilaterally. A left earmold was taken without incident. Patient requested a step down silicone earmold with a large vent, canal lock and no pull tab.      ASSESSMENT:    Right cochlear implant programming today.    PLAN: Justus was seen for cochlear implant programming today. She will return in 2 months or sooner if concerns arise. Please call this clinic with questions regarding these results or recommendations.      Tato Damico, [facility]  Licensed Audiologist  [place] License #[id number]                    .

## 2023-09-20 ENCOUNTER — OFFICE VISIT (OUTPATIENT)
Dept: AUDIOLOGY | Facility: CLINIC | Age: 68
End: 2023-09-20
Attending: OTOLARYNGOLOGY
Payer: MEDICARE

## 2023-09-20 PROCEDURE — 999N000020 HC STATISTIC AUDIOLOGY SPEECH AURAL REHAB VISIT: Performed by: SPEECH-LANGUAGE PATHOLOGIST

## 2023-09-20 PROCEDURE — 92507 TX SP LANG VOICE COMM INDIV: CPT | Mod: GN | Performed by: SPEECH-LANGUAGE PATHOLOGIST

## 2023-10-05 ENCOUNTER — OFFICE VISIT (OUTPATIENT)
Dept: AUDIOLOGY | Facility: CLINIC | Age: 68
End: 2023-10-05
Payer: MEDICARE

## 2023-10-05 DIAGNOSIS — H90.3 SENSORY HEARING LOSS, BILATERAL: Primary | ICD-10-CM

## 2023-10-05 NOTE — PROGRESS NOTES
"AUDIOLOGY REPORT    SUBJECTIVE:   Justus Lozano is a 67 year old adult was seen in the Audiology Clinic at [facility] on 10/05/23. Alem Vizcaino M.D. implanted Justus with a right [employer] HiRes Ultra 3D slim J cochlear implant (CI) on 7/10/2023 due to mild to profound sensorineural hearing loss and lack of benefit from hearing aids.    The patient reports that she does feel she is improving with her cochlear implant. She has been wearing progressive level 3 mostly but feels 4 is more clear but noisy. She reported that she is interested in trying more programs and wants to \"maximize\" the cochlear implant before she gets her hearing aid. She also reported that she doesn't think she should be trying to adjust to something that is not maximized\". She reported that she is unable to tell the difference between voices and that two different pitches on the piano sound that same. In addition she reported she is unable to hear on the phone.    OBJECTIVE: Alem Vizcaino M.D., cochlear implant surgeon, ordered today's appointment. The patient came to the clinic for adjustment to the programs in the external speech processor and for assessment of the external components of the cochlear implant system. These components provide power and data to the internal device. Sound is only heard once the external portion is activated. Postoperative treatment, including device fitting and adjustment, audiologic assessments, and training are required at regular intervals. Testing can include electropsychophysical measures of threshold, comfort, and loudness balancing, which are completed to update the program.    Processor type: Zari CI M90 with T-shanell  Headpiece type: UHP  Magnet strength: 2    TEST RESULTS:   Tympanograms: Did not test  Unaided Thresholds: Not tested today  Electrode Impedances: within normal limits  Datalogging: N/A hours of use per day  Neural Response Testing: Did not " "test  Facial Stimulation: Absent  Tinnitus: Present  Balance Problems: Absent  Pain/Discomfort: Absent  Strategies Tried: tried [place]-P and [place]-S  Strategy Preference: [place] S    Programs:  1. Autosense (HiRes [place]-S)  2. Speech in Noise    Number of Channels per Program: 13 (Electrodes 14-16 disabled due to non auditory stimulation and abnormal loudness growth)    M levels were measured using loudness scaling and electrodes were balanced across the array. There were no changes in M levels following mapping.     The majority of today's appointment was spent discussing realistic expectations. I reviewed the fact that there were no changes in M levels which indicates that her current map is appropriate. I explained that cochlear implants are different than hearing aids and there is no way to \"verify\" the programming, instead the brain needs time to adapt to hearing electrically. The only way to maximize the cochlear implant is with time and practice. There is no programming changes that can be made to help her differentiate pitch or hearing on the phone. Pitch perception is difficult with a cochlear implant and phone use takes time and practice. She expressed understanding.      ASSESSMENT:    Right cochlear implant programming today.    PLAN: Justus was seen for cochlear implant programming today. She will return in 1 month or sooner if concerns arise. Please call this clinic with questions regarding these results or recommendations.      Tato Damico, [facility]  Licensed Audiologist  [place] License #[id number]                    .   "

## 2023-11-10 ENCOUNTER — OFFICE VISIT (OUTPATIENT)
Dept: AUDIOLOGY | Facility: CLINIC | Age: 68
End: 2023-11-10
Payer: MEDICARE

## 2023-11-10 DIAGNOSIS — H90.3 SENSORY HEARING LOSS, BILATERAL: Primary | ICD-10-CM

## 2023-11-10 PROCEDURE — V5011 HEARING AID FITTING/CHECKING: HCPCS | Performed by: AUDIOLOGIST

## 2023-11-10 PROCEDURE — 92604 REPROGRAM COCHLEAR IMPLT 7/>: CPT | Mod: RT | Performed by: AUDIOLOGIST

## 2023-11-10 PROCEDURE — 92567 TYMPANOMETRY: CPT | Mod: 59 | Performed by: AUDIOLOGIST

## 2023-11-10 PROCEDURE — 92626 EVAL AUD FUNCJ 1ST HOUR: CPT | Mod: 59 | Performed by: AUDIOLOGIST

## 2023-11-10 PROCEDURE — V5241 DISPENSING FEE, MONAURAL: HCPCS | Performed by: AUDIOLOGIST

## 2023-11-10 PROCEDURE — V5264 EAR MOLD/INSERT: HCPCS | Performed by: AUDIOLOGIST

## 2023-11-10 PROCEDURE — V5020 CONFORMITY EVALUATION: HCPCS | Performed by: AUDIOLOGIST

## 2023-11-10 PROCEDURE — V5257 HEARING AID, DIGIT, MON, BTE: HCPCS | Mod: LT | Performed by: AUDIOLOGIST

## 2023-11-10 NOTE — PROGRESS NOTES
AUDIOLOGY REPORT    SUBJECTIVE:   Justus Lozano is a 67 year old adult was seen in the Audiology Clinic at [facility] on 11/10/23. Alem Vizcaino M.D. implanted Justus with a right [employer] HiRes Ultra 3D slim J cochlear implant (CI) on 7/10/2023 due to mild to profound sensorineural hearing loss and lack of benefit from hearing aids.      The patient reports that she feels she is hearing well with her CI. She is continuing to do listening practice and was able to hear very well while serving as an .     OBJECTIVE:   Speech perception testing is conducted at regular intervals to determine the degree of benefit the patient is obtaining from the cochlear implant. Tests are conducted using the cochlear implant without the benefit of lipreading. All tests are conducted in a sound-treated room. Perception of monosyllabic words and words in sentences are tested. Results usually show improvement over time. A decrease in performance indicates the need for re-programming of the prosthesis and/or replacement of component    INTERVAL: 3 months     35 minutes were spent assessing the patient s auditory rehabilitation status.    Device(s) used for Testing:   Right ear: Zari M90 CI  Left ear: Zari Link M hearing aid     Soundfield Aided Thresholds: Aided thresholds in normal to mild hearing loss range.  Unaided Thresholds: Not tested  Tympanograms: normal eardrum mobility bilaterally    CNC Words Test:  The patient repeats 25 single syllable words, auditory only. The words are presented at 60 dB SPL (conversational level) delivered from a CD player.    Preoperative Performance:  Left ear aided: words: 32%, phonemes: 63%  Right ear aided: words: 4%, phonemes: 31%    3 months Post-Activation of CI: today  Right: words: 68%, phonemes: 83%    AzBio Sentences Test:  The patient repeats 20 sentences, auditory only.  The sentences are presented at 60 dB SPL  (conversational level) delivered from a CD player.     Preoperative Performance:  Left ear aided: 64%, +10 dB SNR: 59%%  Right ear aided: 45%  Bilaterally aided: 73%, +10 dB SNR: 45%    3 months Post-Activation of CI: (today)  Right: 97%  Bilateral/Bimodal: +10 dB SNR: 82%    Alem Vizcaino M.D., cochlear implant surgeon, ordered today's appointment. The patient came to the clinic for adjustment to the programs in the external speech processor and for assessment of the external components of the cochlear implant system. These components provide power and data to the internal device. Sound is only heard once the external portion is activated. Postoperative treatment, including device fitting and adjustment, audiologic assessments, and training are required at regular intervals. Testing can include electropsychophysical measures of threshold, comfort, and loudness balancing, which are completed to update the program.    Processor type: Zari CI M90 with T-shanell  Headpiece type: UHP  Magnet strength: 2    TEST RESULTS:   Unaided Thresholds: Not tested today  Electrode Impedances: within normal limits  Datalogging: N/A hours of use per day  Neural Response Testing: Did not test  Facial Stimulation: Absent  Tinnitus: Present  Balance Problems: Absent  Pain/Discomfort: Absent  Strategies Tried: tried [place]-P and [place]-S  Strategy Preference: [place] S    Programs:  1. Autosense (Tatyanaes [place]-S)  2. Speech in Noise    Number of Channels per Program: 13 (Electrodes 14-16 disabled due to non auditory stimulation and abnormal loudness growth)    No changes were made to the patient's current map today. Volume control was activated.     The hearing aid conformity evaluation was completed.The hearing aids were placed and they provided a good fit. Real-ear-probe-microphone measurements were completed on the [employer] system and were a good match to NAL-NL1 target with soft sounds audible, moderate sounds comfortable, and loud sounds  below discomfort. UCLs are verified through maximum power output measures and demonstrate appropriate limiting of loud inputs. Justus was oriented to proper hearing aid use, care, cleaning (no water, dry brush), batteries (size: BATTERY SIZE: 13, insertion/removal, toxicity, low-battery signal), aid insertion/removal, user booklet, warranty information, storage cases, and other hearing aid details. The patient confirmed understanding of hearing aid use and care, and showed proper insertion of hearing aid and batteries while in the office today.Justus reported good volume and sound quality today.   Hearing aids were programmed as follows:    Program 1:Autosense  Program 2: Speech in Noise    EAR(S) FIT: Left  HEARING AID MODEL NAME: Zari SANCHEZ  HEARING AID STYLE: Behind-the-ear  EARMOLDS/TIP: Slim tip with 0 slim tube  SERIAL NUMBERS: Left: [id number]  WARRANTY END DATE: 12/31/26        ASSESSMENT:    Speech perception testing indicate significant improvement and benefit from cochlear implant. Verification measures were performed. Justus signed the Hearing Aid Purchase Agreement and was given a copy, as well as details on Justus Lozano's hearing aids. Patient was counseled that exact out of pocket amounts cannot be determined for hearing aid claims being sent to insurance. Any insurance coverage information presented to the patient is an estimate only, and is not a guarantee of payment. Patient has been advised to check with their own insurance.    PLAN: Justus will return for follow-up in 2-3 weeks for a hearing aid review appointment. Please call this clinic with questions regarding today s appointment.      Tato Damico, [facility]  Licensed Audiologist  [place] License #[id number]

## 2023-11-20 ENCOUNTER — TELEPHONE (OUTPATIENT)
Dept: AUDIOLOGY | Facility: CLINIC | Age: 68
End: 2023-11-20
Payer: MEDICARE

## 2023-11-20 NOTE — TELEPHONE ENCOUNTER
Justus called asking if she could purchase hearing aid batteries from the clinic. I told her that I didn't believe so, but she should be able to buy at regular pharmacy/retail store. I will check with audiologist to confirm. She also wanted to say how pleased she was with cochlear implant performance, saying her recent testing showed she's gained much speech understanding with her new CI and could recently hear her grandchildren.     Abner SANCHEZ 11/20/23

## 2023-11-30 ENCOUNTER — OFFICE VISIT (OUTPATIENT)
Dept: AUDIOLOGY | Facility: CLINIC | Age: 68
End: 2023-11-30
Payer: MEDICARE

## 2023-11-30 DIAGNOSIS — H90.3 SENSORY HEARING LOSS, BILATERAL: Primary | ICD-10-CM

## 2023-12-01 ENCOUNTER — TELEPHONE (OUTPATIENT)
Dept: AUDIOLOGY | Facility: CLINIC | Age: 68
End: 2023-12-01
Payer: MEDICARE

## 2023-12-01 NOTE — PROGRESS NOTES
"AUDIOLOGY REPORT    SUBJECTIVE:   Justus Lozano is a 68 year old adult who was seen in the Audiology Clinic at the [facility] on 11/30/2023  for a follow-up check regarding the fitting of new hearing aid .Alem Vizcaino M.D. implanted Justus with a right [employer] HiRes Ultra 3D slim J cochlear implant (CI) on 7/10/2023 due to mild to profound sensorineural hearing loss and lack of benefit from hearing aids. She was fit with a left Phonak Zari Link M on 11/10/23/    The patient reports that she feels she is hearing well with her CI and new hearing aid. She reports sound is \"haddad\" with both but typically is turning both down 1 step. She also reported she would like her TV Connect set back to automatic. She also reported that while she is not getting feedback from the earmold, she feels the canal portion is a little too big and causes irritation, especially after wearing all day.    OBJECTIVE:   Alem Vizcaino M.D., cochlear implant surgeon, ordered today's appointment. The patient came to the clinic for adjustment to the programs in the external speech processor and for assessment of the external components of the cochlear implant system. These components provide power and data to the internal device. Sound is only heard once the external portion is activated. Postoperative treatment, including device fitting and adjustment, audiologic assessments, and training are required at regular intervals. Testing can include electropsychophysical measures of threshold, comfort, and loudness balancing, which are completed to update the program.    Processor type: Zari CI M90 with T-shanell  Headpiece type: UHP  Magnet strength: 2    TEST RESULTS:   Unaided Thresholds: Not tested today  Electrode Impedances: within normal limits  Datalogging: N/A hours of use per day  Neural Response Testing: Did not test  Facial Stimulation: Absent  Tinnitus: Present  Balance Problems: " Absent  Pain/Discomfort: Absent  Strategies Tried: tried [place]-P and [place]-S  Strategy Preference: [place] S    Programs:  1. Autosense (HiRes [place]-S)  2. Speech in Noise    Number of Channels per Program: 13 (Electrodes 14-16 disabled due to non auditory stimulation and abnormal loudness growth)    Based on patient report, the following changes were made; overall M levels were decreased by 1 CU in the CI and overall gain in the hearing aid was decreased by 1 dB. Settings for the TV Connect was set to automatic.     Reviewed 45 day trial period, care, cleaning (no water, dry brush), batteries (size 13) insertion/removal, toxicity, low-battery signal), aid insertion/removal, volume adjustment (if applicable), user booklet, warranty information, storage cases, and other hearing aid details.         ASSESSMENT:    A follow-up appointment for hearing aid fitting was completed today. CI impedances stable.     PLAN: Justus will return for follow up in 3 months, sooner per concern. Please call this clinic with any questions regarding today s appointment.      Tato Damico, [facility]  Licensed Audiologist  [place] License #[id number]

## 2023-12-01 NOTE — TELEPHONE ENCOUNTER
Terrancem to schedule CI 90 in 3 months. Let her know appt would be mid-March, call back to schedule, # given.    -Rubi SANCHEZ 12/1/23

## 2024-01-02 ENCOUNTER — OFFICE VISIT (OUTPATIENT)
Dept: AUDIOLOGY | Facility: CLINIC | Age: 69
End: 2024-01-02
Payer: MEDICARE

## 2024-01-02 DIAGNOSIS — H90.3 SENSORY HEARING LOSS, BILATERAL: Primary | ICD-10-CM

## 2024-01-02 PROCEDURE — 99207 PR ASSESSMENT FOR HEARING AID: CPT | Performed by: AUDIOLOGIST

## 2024-01-03 NOTE — PROGRESS NOTES
AUDIOLOGY REPORT    SUBJECTIVE:   Justus Lozano is a 68 year old adult who was seen in the Audiology Clinic at the [facility] on 1/2/24 for an earmold impression. Alem Vizcaino M.D. implanted Justus with a right [employer] HiRes Ultra 3D slim J cochlear implant (CI) on 7/10/2023 due to mild to profound sensorineural hearing loss and lack of benefit from hearing aids. She was fit with a left Phonak Zari Link M on 11/10/23.    Justus reported that she received the remade earmold and found that she likes the thinner canal lock but that it still causes pressure on the bottom of her ear canal. In addition she reported that the vent hole was smaller which she did not like. She is still having issues with the sound quality of the implant.    OBJECTIVE:   Otoscopy revealed ears are clear of cerumen bilaterally. A left earmold was taken without incident. A silicone slim tip step down earmold will be ordered without a pull tab with a thinner canal lock and larger vent.      ASSESSMENT:    No charge earmold impression taken without incident. Earmold will be mailed to the patient when it arrives in clinic.    PLAN:   Justus will return for follow up in 3 months, sooner per concern. Please call this clinic with any questions regarding today s appointment.      Tato Damico, [facility]  Licensed Audiologist  [place] License #[id number]

## 2024-01-31 ENCOUNTER — ALLIED HEALTH/NURSE VISIT (OUTPATIENT)
Dept: INFUSION THERAPY | Facility: CLINIC | Age: 69
End: 2024-01-31
Payer: MEDICARE

## 2024-01-31 VITALS
RESPIRATION RATE: 16 BRPM | SYSTOLIC BLOOD PRESSURE: 155 MMHG | TEMPERATURE: 97.7 F | DIASTOLIC BLOOD PRESSURE: 80 MMHG | HEART RATE: 76 BPM

## 2024-01-31 DIAGNOSIS — Z92.29 PERSONAL HISTORY OF OTHER DRUG THERAPY: ICD-10-CM

## 2024-01-31 DIAGNOSIS — M81.0 SENILE OSTEOPOROSIS: Primary | ICD-10-CM

## 2024-01-31 PROCEDURE — 250N000011 HC RX IP 250 OP 636: Mod: JZ | Performed by: SPECIALIST

## 2024-01-31 PROCEDURE — 96372 THER/PROPH/DIAG INJ SC/IM: CPT | Performed by: SPECIALIST

## 2024-01-31 RX ORDER — EPINEPHRINE 1 MG/ML
0.3 INJECTION, SOLUTION INTRAMUSCULAR; SUBCUTANEOUS EVERY 5 MIN PRN
Status: CANCELLED | OUTPATIENT
Start: 2024-07-19

## 2024-01-31 RX ORDER — DIPHENHYDRAMINE HYDROCHLORIDE 50 MG/ML
50 INJECTION INTRAMUSCULAR; INTRAVENOUS
Status: CANCELLED
Start: 2024-07-19

## 2024-01-31 RX ORDER — ALBUTEROL SULFATE 90 UG/1
1-2 AEROSOL, METERED RESPIRATORY (INHALATION)
Status: CANCELLED
Start: 2024-07-19

## 2024-01-31 RX ORDER — ALBUTEROL SULFATE 0.83 MG/ML
2.5 SOLUTION RESPIRATORY (INHALATION)
Status: CANCELLED | OUTPATIENT
Start: 2024-07-19

## 2024-01-31 RX ORDER — METHYLPREDNISOLONE SODIUM SUCCINATE 125 MG/2ML
125 INJECTION, POWDER, LYOPHILIZED, FOR SOLUTION INTRAMUSCULAR; INTRAVENOUS
Status: CANCELLED
Start: 2024-07-19

## 2024-01-31 RX ORDER — MEPERIDINE HYDROCHLORIDE 25 MG/ML
25 INJECTION INTRAMUSCULAR; INTRAVENOUS; SUBCUTANEOUS EVERY 30 MIN PRN
Status: CANCELLED | OUTPATIENT
Start: 2024-07-19

## 2024-01-31 RX ADMIN — DENOSUMAB 60 MG: 60 INJECTION SUBCUTANEOUS at 09:35

## 2024-01-31 ASSESSMENT — PAIN SCALES - GENERAL: PAINLEVEL: NO PAIN (0)

## 2024-01-31 NOTE — PROGRESS NOTES
Infusion Nursing Note:  Justus Lozano presents today for prolia.    Patient seen by provider today: No   present during visit today: Not Applicable.    Note: N/A.      Intravenous Access:  No Intravenous access/labs at this visit.    Treatment Conditions:  Lab Results   Component Value Date     02/16/2022    POTASSIUM 4.0 02/16/2022    CR 0.59 03/08/2023    HANDY 9.3 03/08/2023    BILITOTAL 0.5 09/14/2016    ALBUMIN 3.4 09/14/2016    ALT 18 09/14/2016    AST 13 09/14/2016       Results reviewed, labs MET treatment parameters, ok to proceed with treatment.      Post Infusion Assessment:  Patient tolerated injection without incident.       Discharge Plan:   Patient and/or family verbalized understanding of discharge instructions and all questions answered.  AVS to patient via [employer].     Patient discharged in stable condition accompanied by: self.  Departure Mode: Ambulatory.      Berta Partida RN

## 2024-03-20 ENCOUNTER — OFFICE VISIT (OUTPATIENT)
Dept: AUDIOLOGY | Facility: CLINIC | Age: 69
End: 2024-03-20
Payer: MEDICARE

## 2024-03-20 DIAGNOSIS — H90.3 SENSORY HEARING LOSS, BILATERAL: Primary | ICD-10-CM

## 2024-03-20 PROCEDURE — 92626 EVAL AUD FUNCJ 1ST HOUR: CPT | Mod: XU | Performed by: AUDIOLOGIST

## 2024-03-20 PROCEDURE — 92567 TYMPANOMETRY: CPT | Mod: XU | Performed by: AUDIOLOGIST

## 2024-03-20 PROCEDURE — 92604 REPROGRAM COCHLEAR IMPLT 7/>: CPT | Performed by: AUDIOLOGIST

## 2024-03-21 NOTE — PROGRESS NOTES
AUDIOLOGY REPORT    SUBJECTIVE:   Justus Lozano is a 68 year old adult who was seen in the Audiology Clinic at [facility] on 3/20/24. Alem Vizcaino M.D. implanted Justus with a right [employer] HiRes Ultra 3D slim J cochlear implant (CI) on 7/10/2023 due to mild to profound sensorineural hearing loss and lack of benefit from hearing aids.      The patient reports that she hears an echo with her CI both with her voice and with others which at times interferes with the clarity of speech. She reported the echo with her voice is better when she moves the T-shanell. Other issues reported: she has trouble with the destinee when she splits the volume control it does not stay split, she inquired about a way to tell the battery life of her hearing aid and wondered about a smaller battery for her CI.    OBJECTIVE:   Speech perception testing is conducted at regular intervals to determine the degree of benefit the patient is obtaining from the cochlear implant. Tests are conducted using the cochlear implant without the benefit of lipreading. All tests are conducted in a sound-treated room. Perception of monosyllabic words and words in sentences are tested. Results usually show improvement over time. A decrease in performance indicates the need for re-programming of the prosthesis and/or replacement of component    INTERVAL: 6 months     35 minutes were spent assessing the patient s auditory rehabilitation status.    Device(s) used for Testing:   Right ear: Zari M90 CI  Left ear: Zari Link M hearing aid     Soundfield Aided Thresholds: Aided thresholds in normal to mild hearing loss range.  Unaided Thresholds: Not tested  Tympanograms: normal eardrum mobility bilaterally    CNC Words Test:  The patient repeats 25 single syllable words, auditory only. The words are presented at 60 dB SPL (conversational level) delivered from a CD player.    Preoperative Performance:  Left ear aided:  words: 32%, phonemes: 63%  Right ear aided: words: 4%, phonemes: 31%    3 months Post-Activation of CI: (11/10/23)  Right: words: 68%, phonemes: 83%    6 months Post-Activation of CI: (today)  Right: words: 88%    AzBio Sentences Test:  The patient repeats 20 sentences, auditory only.  The sentences are presented at 60 dB SPL (conversational level) delivered from a CD player.     Preoperative Performance:  Left ear aided: 64%, +10 dB SNR: 59%%  Right ear aided: 45%  Bilaterally aided: 73%, +10 dB SNR: 45%    3 months Post-Activation of CI: (11/10/23)  Right: 97%  Bimodal: +10 dB SNR: 82%    6 months Post-Activation of CI: (today)  Right: 96%  Bimodal: +10 dB SNR: 90%    Alem Vizcaino M.D., cochlear implant surgeon, ordered today's appointment. The patient came to the clinic for adjustment to the programs in the external speech processor and for assessment of the external components of the cochlear implant system. These components provide power and data to the internal device. Sound is only heard once the external portion is activated. Postoperative treatment, including device fitting and adjustment, audiologic assessments, and training are required at regular intervals. Testing can include electropsychophysical measures of threshold, comfort, and loudness balancing, which are completed to update the program.    Processor type: Zari CI M90 with T-shanell  Headpiece type: UHP  Magnet strength: 2    TEST RESULTS:   Unaided Thresholds: Not tested today  Electrode Impedances: within normal limits  Datalogging: N/A hours of use per day  Neural Response Testing: Did not test  Facial Stimulation: Absent  Tinnitus: Present  Balance Problems: Absent  Pain/Discomfort: Absent  Strategies Tried: tried [place]-P and [place]-S  Strategy Preference: [place] S    Programs:  1. Autosense (HiRes [place]-S)  2. Speech in Noise  3. Calm Situation (previous map with APW)    Number of Channels per Program: 13 (Electrodes 14-16 disabled due to non  "auditory stimulation and abnormal loudness growth)    The following changes were made to try and improve the echo sound quality:  - Sweeping electrodes to determine if one electrode is causing sound quality  - Adjusting IDR  - Manually decreasing electrode 1 which was significantly higher than neighboring electrodes (this improved things somewhat but not significantly)  - Tilting map profile up in the high frequencies  - Turning on and off Softvoice and Clearvoice  - Increasing middle profile  - Changing pulse width to manual and increasing to 53.9 which decreased pulse rate to 1237 pps and increasing M levels in live voice until she reported comfort     Changing the pulse width she reported the echo was significantly improved and clarity was better. Due to this large change, I also gave her her previous program in program slot 3 in case she needs it.    We also discussed realistic expectations at length. We discussed that her speech perception scores are excellent and well above average for cochlear implant performance. The sound quality will never be \"normal\" and there will always situations where she needs to ask for repetition. We will work on the sound quality however the echo may be something that will normalize over time and we are not going to sacrifice speech clarity for sound clarity. She expressed understanding.      ASSESSMENT:    Speech perception testing indicate significant improvement and benefit from cochlear implant. Cochlear implant programming was completed    PLAN:   Justus will return for follow-up in 3 months for cochlear implant programming. Please call this clinic with questions regarding today s appointment.      Tato Damico, [facility]  Licensed Audiologist  [place] License #[id number]     "

## 2024-04-30 ENCOUNTER — OFFICE VISIT (OUTPATIENT)
Dept: AUDIOLOGY | Facility: CLINIC | Age: 69
End: 2024-04-30
Payer: MEDICARE

## 2024-04-30 DIAGNOSIS — H90.3 SENSORINEURAL HEARING LOSS (SNHL) OF BOTH EARS: Primary | ICD-10-CM

## 2024-04-30 PROCEDURE — 92604 REPROGRAM COCHLEAR IMPLT 7/>: CPT | Performed by: AUDIOLOGIST

## 2024-04-30 NOTE — PROGRESS NOTES
AUDIOLOGY REPORT    SUBJECTIVE:   Justus Lozano is a 68 year old adult who was seen in the Audiology Clinic at [facility] on 4/30/24. Alem Vizcaino M.D. implanted Justus with a right [employer] HiRes Ultra 3D slim J cochlear implant (CI) on 7/10/2023 due to mild to profound sensorineural hearing loss and lack of benefit from hearing aids.      The patient reports that following our last appointment the sound quality has been better, however she is still missing some words and still feels her own voice sound quality is poor and echoy. Theresa Guidry, Clinical Specialist from [employer] was present for today's appointment for programming assistance.     OBJECTIVE:   Alem Vizcaino M.D., cochlear implant surgeon, ordered today's appointment. The patient came to the clinic for adjustment to the programs in the external speech processor and for assessment of the external components of the cochlear implant system. These components provide power and data to the internal device. Sound is only heard once the external portion is activated. Postoperative treatment, including device fitting and adjustment, audiologic assessments, and training are required at regular intervals. Testing can include electropsychophysical measures of threshold, comfort, and loudness balancing, which are completed to update the program.    Processor type: Zari CI M90 with T-shanell  Headpiece type: UHP  Magnet strength: 2    TEST RESULTS:   Unaided Thresholds: Not tested today  Electrode Impedances: within normal limits  Datalogging: N/A hours of use per day  Neural Response Testing: Did not test  Facial Stimulation: Absent  Tinnitus: Present  Balance Problems: Absent  Pain/Discomfort: Absent  Strategies Tried: tried [place]-P and [place]-S  Strategy Preference: [place] S    Programs:  1. Autosense (eSRT based map)  2. Calm Situation (previous map)    Number of Channels per Program: 13  "(Electrodes 14-16 disabled due to non auditory stimulation and abnormal loudness growth)    Electrically-Evoked Stapedial Reflex Threshold (eSRT) testing was done using tone bursts on all active electrodes. A new map was created with M levels set 20% below eSRT based on [employer] recommended protocol. Her eSRT map was very similar to her behaviorally set map with the largest deviations in the low frequency and high frequency electrodes. In live voice she immediately reported that the sound quality was better but the volume was a little soft. I attempted to increased M levels globally which she said made it sound slightly echoy again. I clipped electrode 1 and electrodes 12 and 13. She did not notice a difference in sound quality with these changes. After talking more she reported she didn't know if the new map was better. We gave her both to try.    We again discussed realistic expectations and that her speech scores are significantly above average so we don't want to make too many changes for sound quality and have it affect the clarity. In addition we discussed that the sound quality with a cochlear implant will never be \"normal\" and that over time the echo may seem less prominent if she is not listening for it. We also discussed having her do rehab exercises where she reads aloud to herself in order to spend more additional time listening and getting used to her own voice. She expressed understanding.      ASSESSMENT:    Cochlear implant programming and eSRT measurement was completed    PLAN:   Justus will return for follow-up in 2 months for cochlear implant programming. Please call this clinic with questions regarding today s appointment.      Tato Damico, [facility]  Licensed Audiologist  [place] License #[id number]     "

## 2024-06-21 ENCOUNTER — OFFICE VISIT (OUTPATIENT)
Dept: AUDIOLOGY | Facility: CLINIC | Age: 69
End: 2024-06-21
Payer: MEDICARE

## 2024-06-21 DIAGNOSIS — H90.3 SENSORINEURAL HEARING LOSS (SNHL) OF BOTH EARS: Primary | ICD-10-CM

## 2024-06-21 NOTE — PROGRESS NOTES
AUDIOLOGY REPORT    SUBJECTIVE:   Justus Lozano is a 68 year old adult who was seen in the Audiology Clinic at [facility] on 6/21/24. Alem Vizcaino M.D. implanted Justus with a right Advanced [employer] HiRes Ultra 3D slim J cochlear implant (CI) on 7/10/2023 due to mild to profound sensorineural hearing loss and lack of benefit from hearing aids.      The patient reports that her new map is better and she is getting a lot less extraneous noise. She reports her previous map she hears her grandchildren better but gets more noise.     OBJECTIVE:   Alem Vizcaino M.D., cochlear implant surgeon, ordered today's appointment. The patient came to the clinic for adjustment to the programs in the external speech processor and for assessment of the external components of the cochlear implant system. These components provide power and data to the internal device. Sound is only heard once the external portion is activated. Postoperative treatment, including device fitting and adjustment, audiologic assessments, and training are required at regular intervals. Testing can include electropsychophysical measures of threshold, comfort, and loudness balancing, which are completed to update the program.    Processor type: Zari CI M90 with T-shanell  Headpiece type: UHP  Magnet strength: 2    TEST RESULTS:   Unaided Thresholds: Not tested today  Electrode Impedances: within normal limits  Datalogging: N/A hours of use per day  Neural Response Testing: Did not test  Facial Stimulation: Absent  Tinnitus: Present  Balance Problems: Absent  Pain/Discomfort: Absent  Strategies Tried: tried [place]-P and [place]-S  Strategy Preference: Optima S    Programs: Primary processor  1. Autosense (High frequency electrodes tilted up, pulse rate 60)  2. Speech in noise    Secondly processor  1. Autosense (pulse rate 69.1)  2. Speech in noice    Number of Channels per Program: 13 (Electrodes 14-16 disabled due to non  "auditory stimulation and abnormal loudness growth)    We discussed changes that we could make to the maps, however I again reiterated that we are unwilling to sacrifice clarity for sound quality as sound quality will never be \"normal\" We discussed that her speech scores are significantly above average and there will always be things that don't sound as she remembered. We discussed that her previous map had a lot more high frequency stimulation, which is likely why she was hearing her grandchildren better so we can try to tilt up the high frequencies more than in the eSRT map but less than her previous map. We discussed slowing the pulse rate down more and that we can experiment with them and see how it sounds.    We tried two slower pulse rates, 60 and 69.1 and the 69.1 rate she reported sounded a lot less echoy but a lot more suppressed. She would like to try both is separate processors.       ASSESSMENT:    Cochlear implant programming     PLAN:   Justus will return for follow-up in 3 months for cochlear implant programming and speech perception testing, sooner per concern. Please call this clinic with questions regarding today s appointment.      Tato Damico, [facility]  Licensed Audiologist  [place] License #[id number]     "

## 2024-06-26 ENCOUNTER — OFFICE VISIT (OUTPATIENT)
Dept: AUDIOLOGY | Facility: CLINIC | Age: 69
End: 2024-06-26
Payer: MEDICARE

## 2024-06-26 DIAGNOSIS — H90.3 SENSORINEURAL HEARING LOSS, BILATERAL: Primary | ICD-10-CM

## 2024-06-26 NOTE — PROGRESS NOTES
AUDIOLOGY REPORT    SUBJECTIVE: Justus Lozano is a 68 year old adult who was seen in the Audiology Clinic at [facility] on 6/26/2024. Alem Vizcaino M.D. implanted the patient with a right Advanced [employer] HiRes Ultra 3D slim J cochlear implant (CI) on 7/10/2023 due to mild to profound sensorineural hearing loss and lack of benefit from hearing aids. The high frequencies were tilted up at her last visit on 6/21/2024, which she reports helps with hearing her grandchildren, but she want this as a separate program.     OBJECTIVE: The patient came to the clinic for adjustment to the programs in the external speech processor and for assessment of the external components of the CI system. These components provide power and data to the internal device. Sound is only heard once the external portion is activated. Postoperative treatment, including device fitting and adjustment, audiologic assessments, and training are required at regular intervals. Testing can include electropsychophysical measures of threshold, comfort, and loudness balancing, which are completed to update the program.    Processor type: Whatever CI M90 with T-shanell  Headpiece type: UHP  Magnet strength: 2    TEST RESULTS:   Electrode Impedances: Stable ad within normal limits  Neural Response Testing: Did not test  Facial Stimulation: Absent  Tinnitus: Present  Balance Problems: Absent  Pain/Discomfort: Absent  Strategies Tried: tried [place]-P and [place]-S  Strategy Preference: Optima S    Programs: Primary processor  1. Autosense from 4/30/2024  2. Speech in noise from 4/30/2024  3. Calm with high frequencies tilted up    Programs: Secondary processor  1. Autosense from 6/21/2024 (pulse rate 69.1)  2. Speech in noise from 6/21/2024  3. Calm with high frequencies tilted up    Number of Channels per Program: 13 (electrodes 14-16 are disabled due to non-auditory stimulation and abnormal loudness growth)    ASSESSMENT:  CI follow-up programming.    PLAN: Justus will return in 3 months for CI follow-up programming and speech perception testing, sooner if concerns arise. Please call this clinic with questions regarding today s appointment.      Ling Santana, [facility]  Licensed Audiologist  [place] #[id number]

## 2024-07-09 ENCOUNTER — TRANSFERRED RECORDS (OUTPATIENT)
Dept: HEALTH INFORMATION MANAGEMENT | Facility: CLINIC | Age: 69
End: 2024-07-09
Payer: MEDICARE

## 2024-07-09 LAB
CREATININE (EXTERNAL): 0.63 MG/DL (ref 0.52–1.04)
GFR ESTIMATED (EXTERNAL): >60 ML/MIN/1.7
GLUCOSE (EXTERNAL): 84 MG/DL (ref 70–99)
POTASSIUM (EXTERNAL): 4.3 MMOL/L (ref 3.5–5.1)

## 2024-07-11 ENCOUNTER — MEDICAL CORRESPONDENCE (OUTPATIENT)
Dept: HEALTH INFORMATION MANAGEMENT | Facility: CLINIC | Age: 69
End: 2024-07-11
Payer: MEDICARE

## 2024-07-12 ENCOUNTER — TRANSCRIBE ORDERS (OUTPATIENT)
Dept: ONCOLOGY | Facility: CLINIC | Age: 69
End: 2024-07-12
Payer: MEDICARE

## 2024-07-12 DIAGNOSIS — M81.0 SENILE OSTEOPOROSIS: Primary | ICD-10-CM

## 2024-07-12 RX ORDER — METHYLPREDNISOLONE SODIUM SUCCINATE 125 MG/2ML
125 INJECTION, POWDER, LYOPHILIZED, FOR SOLUTION INTRAMUSCULAR; INTRAVENOUS
Status: CANCELLED
Start: 2024-07-12

## 2024-07-12 RX ORDER — EPINEPHRINE 1 MG/ML
0.3 INJECTION, SOLUTION INTRAMUSCULAR; SUBCUTANEOUS EVERY 5 MIN PRN
Status: CANCELLED | OUTPATIENT
Start: 2024-07-12

## 2024-07-12 RX ORDER — DIPHENHYDRAMINE HYDROCHLORIDE 50 MG/ML
50 INJECTION INTRAMUSCULAR; INTRAVENOUS
Status: CANCELLED
Start: 2024-07-12

## 2024-07-12 RX ORDER — MEPERIDINE HYDROCHLORIDE 25 MG/ML
25 INJECTION INTRAMUSCULAR; INTRAVENOUS; SUBCUTANEOUS EVERY 30 MIN PRN
Status: CANCELLED | OUTPATIENT
Start: 2024-07-12

## 2024-07-12 RX ORDER — ALBUTEROL SULFATE 0.83 MG/ML
2.5 SOLUTION RESPIRATORY (INHALATION)
Status: CANCELLED | OUTPATIENT
Start: 2024-07-12

## 2024-07-12 RX ORDER — ALBUTEROL SULFATE 90 UG/1
1-2 AEROSOL, METERED RESPIRATORY (INHALATION)
Status: CANCELLED
Start: 2024-07-12

## 2024-07-31 ENCOUNTER — ALLIED HEALTH/NURSE VISIT (OUTPATIENT)
Dept: INFUSION THERAPY | Facility: CLINIC | Age: 69
End: 2024-07-31
Attending: SPECIALIST
Payer: MEDICARE

## 2024-07-31 VITALS
SYSTOLIC BLOOD PRESSURE: 117 MMHG | RESPIRATION RATE: 16 BRPM | OXYGEN SATURATION: 95 % | HEART RATE: 81 BPM | DIASTOLIC BLOOD PRESSURE: 72 MMHG | TEMPERATURE: 98.6 F | WEIGHT: 138.8 LBS | BODY MASS INDEX: 21.1 KG/M2

## 2024-07-31 DIAGNOSIS — M81.0 SENILE OSTEOPOROSIS: Primary | ICD-10-CM

## 2024-07-31 DIAGNOSIS — Z92.29 PERSONAL HISTORY OF OTHER DRUG THERAPY: ICD-10-CM

## 2024-07-31 PROCEDURE — 96372 THER/PROPH/DIAG INJ SC/IM: CPT | Performed by: SPECIALIST

## 2024-07-31 PROCEDURE — 250N000011 HC RX IP 250 OP 636: Performed by: SPECIALIST

## 2024-07-31 RX ORDER — EPINEPHRINE 1 MG/ML
0.3 INJECTION, SOLUTION INTRAMUSCULAR; SUBCUTANEOUS EVERY 5 MIN PRN
OUTPATIENT
Start: 2025-01-27

## 2024-07-31 RX ORDER — ALBUTEROL SULFATE 90 UG/1
1-2 AEROSOL, METERED RESPIRATORY (INHALATION)
Start: 2025-01-27

## 2024-07-31 RX ORDER — ALBUTEROL SULFATE 0.83 MG/ML
2.5 SOLUTION RESPIRATORY (INHALATION)
OUTPATIENT
Start: 2025-01-27

## 2024-07-31 RX ORDER — DIPHENHYDRAMINE HYDROCHLORIDE 50 MG/ML
50 INJECTION INTRAMUSCULAR; INTRAVENOUS
Start: 2025-01-27

## 2024-07-31 RX ORDER — METHYLPREDNISOLONE SODIUM SUCCINATE 125 MG/2ML
125 INJECTION, POWDER, LYOPHILIZED, FOR SOLUTION INTRAMUSCULAR; INTRAVENOUS
Start: 2025-01-27

## 2024-07-31 RX ORDER — MEPERIDINE HYDROCHLORIDE 25 MG/ML
25 INJECTION INTRAMUSCULAR; INTRAVENOUS; SUBCUTANEOUS EVERY 30 MIN PRN
OUTPATIENT
Start: 2025-01-27

## 2024-07-31 RX ADMIN — DENOSUMAB 60 MG: 60 INJECTION SUBCUTANEOUS at 10:38

## 2024-07-31 ASSESSMENT — PAIN SCALES - GENERAL: PAINLEVEL: NO PAIN (0)

## 2024-07-31 NOTE — PROGRESS NOTES
Infusion Nursing Note:  Justus Lozano presents today for Prolia.    Patient seen by provider today: No   present during visit today: Not Applicable.    Note: Pt had first dose of Prolia in January this year and tolerated it well. Pt confirms she is taking calcium and vitamin D supplement.       Intravenous Access:  No Intravenous access/labs at this visit.    Treatment Conditions:  Ca 9.2, Cr 0.63 from 7/9, drawn at endocrinology [facility].   Results reviewed, labs MET treatment parameters, ok to proceed with treatment.      Post Infusion Assessment:  Patient tolerated injection without incident.  Site patent and intact, free from redness, edema or discomfort.       Discharge Plan:   Discharge instructions reviewed with: Patient.  Patient and/or family verbalized understanding of discharge instructions and all questions answered.  AVS to patient via NabtoT.  Patient will return in 6 months for next appointment.   Patient discharged in stable condition accompanied by: self.  Departure Mode: Ambulatory.      Jim Joseph RN

## 2024-10-02 ENCOUNTER — OFFICE VISIT (OUTPATIENT)
Dept: AUDIOLOGY | Facility: CLINIC | Age: 69
End: 2024-10-02
Payer: MEDICARE

## 2024-10-02 DIAGNOSIS — H90.3 SENSORINEURAL HEARING LOSS, BILATERAL: Primary | ICD-10-CM

## 2024-10-02 NOTE — PROGRESS NOTES
AUDIOLOGY REPORT    SUBJECTIVE:   Justus Lozano is a 68 year old adult who was seen in the Audiology Clinic at [facility] and Surgery [facility] on 10/02/24. Alem Vizcaino M.D. implanted Justus with a right [employer] HiRes Ultra 3D slim J cochlear implant (CI) on 7/10/2023 due to mild to profound sensorineural hearing loss and lack of benefit from hearing aids.      The patient reports that she has been using her primary processor which has a pulse width of 69.1 which isn't as clear as the processor with a pulse width of 53 but has a lot less extraneous noise. She likes the calm situation program when she is with her grandkids.    OBJECTIVE:   Alem Vizcaino M.D., cochlear implant surgeon, ordered today's appointment. The patient came to the clinic for adjustment to the programs in the external speech processor and for assessment of the external components of the cochlear implant system. These components provide power and data to the internal device. Sound is only heard once the external portion is activated. Postoperative treatment, including device fitting and adjustment, audiologic assessments, and training are required at regular intervals. Testing can include electropsychophysical measures of threshold, comfort, and loudness balancing, which are completed to update the program.    Processor type: Zari CI M90 with T-shanell  Headpiece type: UHP  Magnet strength: 2    TEST RESULTS:   Unaided Thresholds: Not tested today  Electrode Impedances: within normal limits  Datalogging: N/A hours of use per day  Neural Response Testing: Did not test  Facial Stimulation: Absent  Tinnitus: Present  Balance Problems: Absent  Pain/Discomfort: Absent  Strategies Tried: tried [place]-P and [place]-S  Strategy Preference: Optima S    Programs: Primary processor  1. Autosense (pulse width 69.1)  2. Calm situation (map with high frequencies increased)  3. Speech in noise        Number of Channels per Program: 13  (Electrodes 14-16 disabled due to non auditory stimulation and abnormal loudness growth)    M levels were measured using loudness scaling with tone bursts. Patient was able to complete task successfully. There were slight decreases to M levels in the high frequencies. In live voice she reported that the volume seemed louder but clarity was good. We discussed different pulse widths and since the 69.1 has been working well for her I suggested we keep it the same. She was in agreement.      ASSESSMENT:    Cochlear implant programming     PLAN:   Justus will return for follow-up in 6 months for cochlear implant programming and speech perception testing, sooner per concern. Please call this clinic with questions regarding today s appointment.      Tato Damico, [facility]  Licensed Audiologist  [place] License #[id number]

## 2024-10-10 ENCOUNTER — TELEPHONE (OUTPATIENT)
Dept: AUDIOLOGY | Facility: CLINIC | Age: 69
End: 2024-10-10
Payer: MEDICARE

## 2024-11-13 ENCOUNTER — OFFICE VISIT (OUTPATIENT)
Dept: AUDIOLOGY | Facility: CLINIC | Age: 69
End: 2024-11-13
Payer: MEDICARE

## 2024-11-13 DIAGNOSIS — H90.3 SENSORINEURAL HEARING LOSS, BILATERAL: Primary | ICD-10-CM

## 2024-11-13 NOTE — PROGRESS NOTES
"AUDIOLOGY REPORT    SUBJECTIVE:   Justus Lozano is a 68 year old adult who was seen in the Audiology Clinic at [facility] and [facility] on 11/13/24. Alem Vizcaino M.D. implanted Justus with a right Advanced [employer] HiRes Ultra 3D slim J cochlear implant (CI) on 7/10/2023 due to mild to profound sensorineural hearing loss and lack of benefit from hearing aids.      The patient reports that she feels she is missing a lot of speech from speakers that she is unfamiliar with, such as a , and also having difficulty hearing her grandchildren. She continues to report an \"echo\" and \"rattly\" sound quality. She was seen in urgent care last month due to a sudden change in hearing in the left ear which has resolved somewhat, she was told she had fluid in her right ear.     OBJECTIVE:   Speech perception testing is conducted at regular intervals to determine the degree of benefit the patient is obtaining from the cochlear implant. Tests are conducted using the cochlear implant without the benefit of lipreading. All tests are conducted in a sound-treated room. Perception of monosyllabic words and words in sentences are tested. Results usually show improvement over time. A decrease in performance indicates the need for re-programming of the prosthesis and/or replacement of component    INTERVAL: 1 year    35 minutes were spent assessing the patient s auditory rehabilitation status.    Device(s) used for Testing:   Right ear: Zari M90 CI  Left ear: Zari Link M hearing aid     Soundfield Aided Thresholds: Aided thresholds in normal to mild hearing loss range.  Unaided Thresholds: Normal to profound sensorineural hearing loss, 10-30 dB decrease in thresholds 2-8 kHz re: audio 8/10/23. Non-significant decrease in word recognition (36% to 20% today)  Tympanograms: normal eardrum mobility bilaterally    CNC Words Test:  The patient repeats 25 single syllable words, auditory only. The words are " presented at 60 dB SPL (conversational level) delivered from a CD player.    Preoperative Performance:  Left ear aided: words: 32%, phonemes: 63%  Right ear aided: words: 4%, phonemes: 31%    3 months Post-Activation of CI: (11/10/23)  Right: words: 68%, phonemes: 83%    6 months Post-Activation of CI: (3/20/24)  Right: words: 88%    1 year Post-Activation of CI (today)  Right: words: 84%    AzBio Sentences Test:  The patient repeats 20 sentences, auditory only.  The sentences are presented at 60 dB SPL (conversational level) delivered from a CD player.     Preoperative Performance:  Left ear aided: 64%, +10 dB SNR: 59%%  Right ear aided: 45%  Bilaterally aided: 73%, +10 dB SNR: 45%    3 months Post-Activation of CI: (11/10/23)  Right: 97%  Bimodal: +10 dB SNR: 82%    6 months Post-Activation of CI: (3/20/24)  Right: 96%  Bimodal: +10 dB SNR: 90%    1 year Post-Activation of CI: (today)  Right: 98%  Bimodal: +10 dB SNR: 96%    Alem Vizcaino M.D., cochlear implant surgeon, ordered today's appointment. The patient came to the clinic for adjustment to the programs in the external speech processor and for assessment of the external components of the cochlear implant system. These components provide power and data to the internal device. Sound is only heard once the external portion is activated. Postoperative treatment, including device fitting and adjustment, audiologic assessments, and training are required at regular intervals. Testing can include electropsychophysical measures of threshold, comfort, and loudness balancing, which are completed to update the program.    Processor type: [employer] CI M90 with T-shanell  Headpiece type: UHP  Magnet strength: 1    TEST RESULTS:   Electrode Impedances: within normal limits  Datalogging: N/A hours of use per day  Neural Response Testing: Did not test  Facial Stimulation: Absent  Tinnitus: Present  Balance Problems: Absent  Pain/Discomfort: Absent  Strategies Tried: tried Optima-P and  "Optima-S  Strategy Preference: Optima S    Programs: Primary processor  1. Autosense (pulse width 53.9)  2. Speech in noise  3. Calm Situation    Programs: Secondary processor  1. Autosense (pulse width 53.9) - 2 CU  2. Speech in noise  3. Calm Situation    Number of Channels per Program: 13 (Electrodes 14-16 disabled due to non auditory stimulation and abnormal loudness growth)    We spent a significant time discussing realistic expectations with the CI. I explained that while I am not minimizing the difficulties she is having in a more real world environment that she is scoring well above average of a CI user, even in background noise. We discussed that the sound quality with the CI will never be \"normal\" compared to acoustic hearing and she may always hear a bit of echo but we do not want to sacrifice the clarity of the speech she has to try and improve sound quality. I explained that we have had multiple programming sessions since March and as a result her aided thresholds are elevated in the high frequencies which could be contributing to her clarity issues. Often times making too many programming adjustments can actually be counterproductive as it takes time for the brain to adjust. She reported feeling like the mapping we did on 4/30/24 sounded the best that she can remember. I activated that map and she immediately reported that the sound quality was better and speech was more clear but the volume was a little loud. She was unsure about whether or not we should turn the volume down therefore her backup processor was programmed with M levels set at -2.      ASSESSMENT:    Cochlear implant programming, speech perception testing, tympanometry and unaided comprehensive audiogram (modified for one ear)    PLAN:   Justus will return for follow-up in 6 months for cochlear implant programming, sooner per concern. Please call this clinic with questions regarding today s appointment.      Tato Damico, " [facility]  Licensed Audiologist  [place] License #[id number]   AUDIOLOGY REPORT    SUBJECTIVE:   Justus Lozano is a 68 year old adult who was seen in the Audiology Clinic at [facility] and [facility] on 11/13/24. Alem Vizcaino M.D. implanted Justus with a right [employer] HiRes Ultra 3D slim J cochlear implant (CI) on 7/10/2023 due to mild to profound sensorineural hearing loss and lack of benefit from hearing aids.      The patient reports that she hears an echo with her CI both with her voice and with others which at times interferes with the clarity of speech. She reported the echo with her voice is better when she moves the T-shanell. Other issues reported: she has trouble with the destinee when she splits the volume control it does not stay split, she inquired about a way to tell the battery life of her hearing aid and wondered about a smaller battery for her CI.    OBJECTIVE:       Alem Vizcaino M.D., cochlear implant surgeon, ordered today's appointment. The patient came to the clinic for adjustment to the programs in the external speech processor and for assessment of the external components of the cochlear implant system. These components provide power and data to the internal device. Sound is only heard once the external portion is activated. Postoperative treatment, including device fitting and adjustment, audiologic assessments, and training are required at regular intervals. Testing can include electropsychophysical measures of threshold, comfort, and loudness balancing, which are completed to update the program.    Processor type: Zari CI M90 with T-shanell  Headpiece type: UHP  Magnet strength: 2    TEST RESULTS:   Unaided Thresholds: Not tested today  Electrode Impedances: within normal limits  Datalogging: N/A hours of use per day  Neural Response Testing: Did not test  Facial Stimulation: Absent  Tinnitus: Present  Balance Problems: Absent  Pain/Discomfort: Absent  Strategies Tried: tried  "[place]-P and [place]-S  Strategy Preference: [place] S    Programs:  1. Autosense (HiRes [place]-S)  2. Speech in Noise  3. Calm Situation (previous map with APW)    Number of Channels per Program: 13 (Electrodes 14-16 disabled due to non auditory stimulation and abnormal loudness growth)    The following changes were made to try and improve the echo sound quality:  - Sweeping electrodes to determine if one electrode is causing sound quality  - Adjusting IDR  - Manually decreasing electrode 1 which was significantly higher than neighboring electrodes (this improved things somewhat but not significantly)  - Tilting map profile up in the high frequencies  - Turning on and off Softvoice and Clearvoice  - Increasing middle profile  - Changing pulse width to manual and increasing to 53.9 which decreased pulse rate to 1237 pps and increasing M levels in live voice until she reported comfort     Changing the pulse width she reported the echo was significantly improved and clarity was better. Due to this large change, I also gave her her previous program in program slot 3 in case she needs it.    We also discussed realistic expectations at length. We discussed that her speech perception scores are excellent and well above average for cochlear implant performance. The sound quality will never be \"normal\" and there will always situations where she needs to ask for repetition. We will work on the sound quality however the echo may be something that will normalize over time and we are not going to sacrifice speech clarity for sound clarity. She expressed understanding.      ASSESSMENT:    Speech perception testing indicate significant improvement and benefit from cochlear implant. Cochlear implant programming was completed    PLAN:   Justus will return for follow-up in 3 months for cochlear implant programming. Please call this clinic with questions regarding today s appointment.      Tato Damico, [facility]  Licensed " Audiologist  [place] License #[id number]

## 2025-01-15 ENCOUNTER — OFFICE VISIT (OUTPATIENT)
Dept: AUDIOLOGY | Facility: CLINIC | Age: 70
End: 2025-01-15
Payer: MEDICARE

## 2025-01-15 DIAGNOSIS — H90.3 SENSORINEURAL HEARING LOSS, BILATERAL: Primary | ICD-10-CM

## 2025-01-15 NOTE — PROGRESS NOTES
AUDIOLOGY REPORT    SUBJECTIVE:   Justus Lozano is a 68 year old adult who was seen in the Audiology Clinic at [facility] and [facility] on 1/15/25. Alem Vizcaino M.D. implanted Justus with a right Advanced [employer] HiRes Ultra 3D slim J cochlear implant (CI) on 7/10/2023 due to mild to profound sensorineural hearing loss and lack of benefit from hearing aids.      The patient reports that she has difficulty hearing her grandchildren speak at times. She also reported that she continues to hear an echo sound quality. She brought her 6 year old granddaughter with her today.    OBJECTIVE:     Alem Vizcaino M.D., cochlear implant surgeon, ordered today's appointment. The patient came to the clinic for adjustment to the programs in the external speech processor and for assessment of the external components of the cochlear implant system. These components provide power and data to the internal device. Sound is only heard once the external portion is activated. Postoperative treatment, including device fitting and adjustment, audiologic assessments, and training are required at regular intervals. Testing can include electropsychophysical measures of threshold, comfort, and loudness balancing, which are completed to update the program.    Processor type: Zari CI M90 with T-shanell  Headpiece type: UHP  Magnet strength: 1    TEST RESULTS:   Electrode Impedances: within normal limits  Datalogging: N/A hours of use per day  Neural Response Testing: Did not test  Facial Stimulation: Absent  Tinnitus: Present  Balance Problems: Absent  Pain/Discomfort: Absent  Strategies Tried: tried [employer]-P and [employer]-S  Strategy Preference: Optima S    Programs: Primary processor  1. Autosense (pulse width 50.3)  2. Speech in noise  3. Calm Situation      Number of Channels per Program: 13 (Electrodes 14-16 disabled due to non auditory stimulation and abnormal loudness growth)    We discussed that children's speech is  "often not enunciated and they tend to talk quickly and quietly therefore I would not expect her to hear all of her grandchildren's speech (I even noticed that her granddaughter has a very soft voice at times and I had difficulty hearing her occasionally). Justus had been thinking she was using Calm Situation which had high frequencies elevated but it's noted that her map is the same in that program, however IDR was set at 60 rather than 70. She reported that speech was clearer with an IDR of 70 therefore it was changed. We attempted to tilt high frequencies up by 1 which she reported sounded too \"rattly\". She requested we try a slower rate as that has helped with sound quality in the past. I decreased it to 40 which she did not think was as clear and made her own voice too echoy and loud. I increased it to 50.3 which she reported sounded crisper. Both processors were programmed with the same programs.      ASSESSMENT:    Cochlear implant programming completed today.    PLAN:   Justus will return for follow-up in 6 months for cochlear implant programming, sooner per concern. Please call this clinic with questions regarding today s appointment.      Tato Damico, [facility]  Licensed Audiologist  [place] License #[id number]     "

## 2025-01-29 ENCOUNTER — LAB REQUISITION (OUTPATIENT)
Dept: LAB | Facility: CLINIC | Age: 70
End: 2025-01-29
Payer: MEDICARE

## 2025-01-29 DIAGNOSIS — R39.9 UNSPECIFIED SYMPTOMS AND SIGNS INVOLVING THE GENITOURINARY SYSTEM: ICD-10-CM

## 2025-01-29 PROCEDURE — 87186 SC STD MICRODIL/AGAR DIL: CPT | Mod: ORL

## 2025-01-31 LAB — BACTERIA UR CULT: ABNORMAL

## 2025-02-20 ENCOUNTER — LAB (OUTPATIENT)
Dept: INFUSION THERAPY | Facility: CLINIC | Age: 70
End: 2025-02-20
Attending: NURSE PRACTITIONER
Payer: MEDICARE

## 2025-02-20 VITALS
OXYGEN SATURATION: 98 % | TEMPERATURE: 97.8 F | WEIGHT: 141 LBS | DIASTOLIC BLOOD PRESSURE: 75 MMHG | HEIGHT: 69 IN | HEART RATE: 73 BPM | RESPIRATION RATE: 16 BRPM | SYSTOLIC BLOOD PRESSURE: 124 MMHG | BODY MASS INDEX: 20.88 KG/M2

## 2025-02-20 DIAGNOSIS — M81.0 SENILE OSTEOPOROSIS: Primary | ICD-10-CM

## 2025-02-20 DIAGNOSIS — Z92.29 PERSONAL HISTORY OF OTHER DRUG THERAPY: ICD-10-CM

## 2025-02-20 DIAGNOSIS — M81.0 SENILE OSTEOPOROSIS: ICD-10-CM

## 2025-02-20 DIAGNOSIS — Z92.29 PERSONAL HISTORY OF OTHER DRUG THERAPY: Primary | ICD-10-CM

## 2025-02-20 LAB
ALBUMIN SERPL BCG-MCNC: 4.3 G/DL (ref 3.5–5.2)
CALCIUM SERPL-MCNC: 9.4 MG/DL (ref 8.8–10.4)
CREAT SERPL-MCNC: 0.61 MG/DL (ref 0.51–1.17)
EGFRCR SERPLBLD CKD-EPI 2021: >90 ML/MIN/1.73M2

## 2025-02-20 PROCEDURE — 82040 ASSAY OF SERUM ALBUMIN: CPT | Performed by: SPECIALIST

## 2025-02-20 PROCEDURE — 96372 THER/PROPH/DIAG INJ SC/IM: CPT | Performed by: SPECIALIST

## 2025-02-20 PROCEDURE — 250N000011 HC RX IP 250 OP 636: Mod: JZ | Performed by: SPECIALIST

## 2025-02-20 PROCEDURE — 82310 ASSAY OF CALCIUM: CPT | Performed by: SPECIALIST

## 2025-02-20 PROCEDURE — 82565 ASSAY OF CREATININE: CPT | Performed by: SPECIALIST

## 2025-02-20 RX ORDER — MEPERIDINE HYDROCHLORIDE 25 MG/ML
25 INJECTION INTRAMUSCULAR; INTRAVENOUS; SUBCUTANEOUS EVERY 30 MIN PRN
OUTPATIENT
Start: 2025-07-26

## 2025-02-20 RX ORDER — ALBUTEROL SULFATE 90 UG/1
1-2 INHALANT RESPIRATORY (INHALATION)
Status: CANCELLED
Start: 2025-07-26

## 2025-02-20 RX ORDER — MEPERIDINE HYDROCHLORIDE 25 MG/ML
25 INJECTION INTRAMUSCULAR; INTRAVENOUS; SUBCUTANEOUS EVERY 30 MIN PRN
Status: CANCELLED | OUTPATIENT
Start: 2025-07-26

## 2025-02-20 RX ORDER — ALBUTEROL SULFATE 0.83 MG/ML
2.5 SOLUTION RESPIRATORY (INHALATION)
Status: CANCELLED | OUTPATIENT
Start: 2025-07-26

## 2025-02-20 RX ORDER — METHYLPREDNISOLONE SODIUM SUCCINATE 125 MG/2ML
125 INJECTION INTRAMUSCULAR; INTRAVENOUS
Start: 2025-07-26

## 2025-02-20 RX ORDER — DIPHENHYDRAMINE HYDROCHLORIDE 50 MG/ML
50 INJECTION INTRAMUSCULAR; INTRAVENOUS
Start: 2025-07-26

## 2025-02-20 RX ORDER — ALBUTEROL SULFATE 0.83 MG/ML
2.5 SOLUTION RESPIRATORY (INHALATION)
OUTPATIENT
Start: 2025-07-26

## 2025-02-20 RX ORDER — METHYLPREDNISOLONE SODIUM SUCCINATE 125 MG/2ML
125 INJECTION INTRAMUSCULAR; INTRAVENOUS
Status: CANCELLED
Start: 2025-07-26

## 2025-02-20 RX ORDER — EPINEPHRINE 1 MG/ML
0.3 INJECTION, SOLUTION INTRAMUSCULAR; SUBCUTANEOUS EVERY 5 MIN PRN
Status: CANCELLED | OUTPATIENT
Start: 2025-07-26

## 2025-02-20 RX ORDER — DIPHENHYDRAMINE HYDROCHLORIDE 50 MG/ML
50 INJECTION INTRAMUSCULAR; INTRAVENOUS
Status: CANCELLED
Start: 2025-07-26

## 2025-02-20 RX ORDER — EPINEPHRINE 1 MG/ML
0.3 INJECTION, SOLUTION INTRAMUSCULAR; SUBCUTANEOUS EVERY 5 MIN PRN
OUTPATIENT
Start: 2025-07-26

## 2025-02-20 RX ORDER — ALBUTEROL SULFATE 90 UG/1
1-2 INHALANT RESPIRATORY (INHALATION)
Start: 2025-07-26

## 2025-02-20 RX ADMIN — DENOSUMAB 60 MG: 60 INJECTION SUBCUTANEOUS at 15:18

## 2025-02-20 NOTE — PROGRESS NOTES
Infusion Nursing Note:  Justus Lozano presents today for Prolia.    Patient seen by provider today: No   present during visit today: Not Applicable.    Note: Pt reports no new health changes or concerns today. Pt confirms she takes her Ca+Vit D supplements as prescribed.      Intravenous Access:  No Intravenous access/labs at this visit.    Treatment Conditions:  Lab Results   Component Value Date     02/16/2022    POTASSIUM 4.0 02/16/2022    CR 0.61 02/20/2025    HANDY 9.4 02/20/2025    BILITOTAL 0.5 09/14/2016    ALBUMIN 4.3 02/20/2025    ALT 18 09/14/2016    AST 13 09/14/2016       Results reviewed, labs MET treatment parameters, ok to proceed with treatment.      Post Infusion Assessment:  Patient tolerated infusion without incident.  Site patent and intact, free from redness, edema or discomfort.  Access discontinued per protocol.       Discharge Plan:   Discharge instructions reviewed with: Patient.  Patient and/or family verbalized understanding of discharge instructions and all questions answered.  AVS to patient via mobilePeopleHART.  Patient will return as advised by her provider for next appointment.   Patient discharged in stable condition accompanied by: self.  Departure Mode: Ambulatory.      Abeba Dubois RN

## 2025-02-20 NOTE — PROGRESS NOTES
Medical Assistant Note:  Justus Lozano presents today for lab draw.    Patient seen by provider today: No.   present during visit today: Not Applicable.    Concerns: No Concerns.    Procedure:  Lab draw site: RAC, Needle type: BF, Gauge: 21. Gauze and coban applied    Post Assessment:  Labs drawn without difficulty: Yes.    Discharge Plan:  Departure Mode: Ambulatory.    Face to Face Time: 5.    Migdalia Smith CMA

## 2025-02-28 ENCOUNTER — LAB REQUISITION (OUTPATIENT)
Dept: LAB | Facility: CLINIC | Age: 70
End: 2025-02-28
Payer: MEDICARE

## 2025-02-28 DIAGNOSIS — R39.9 UNSPECIFIED SYMPTOMS AND SIGNS INVOLVING THE GENITOURINARY SYSTEM: ICD-10-CM

## 2025-02-28 PROCEDURE — 87086 URINE CULTURE/COLONY COUNT: CPT | Mod: ORL | Performed by: OBSTETRICS & GYNECOLOGY

## 2025-03-02 LAB — BACTERIA UR CULT: NORMAL

## 2025-03-26 ENCOUNTER — OFFICE VISIT (OUTPATIENT)
Dept: AUDIOLOGY | Facility: CLINIC | Age: 70
End: 2025-03-26
Payer: MEDICARE

## 2025-03-26 DIAGNOSIS — H90.3 SENSORINEURAL HEARING LOSS, BILATERAL: Primary | ICD-10-CM

## 2025-03-26 NOTE — PROGRESS NOTES
AUDIOLOGY REPORT    SUBJECTIVE:   Justus Lozano is a 68 year old adult who was seen in the Audiology Clinic at [facility] on 3/26/25. Alem Vizcaino M.D. implanted Justus with a right Advanced [employer] HiRes Ultra 3D slim J cochlear implant (CI) on 7/10/2023 due to mild to profound sensorineural hearing loss and lack of benefit from hearing aids.      The patient reports that a few days ago she heard loud clicking in both her CI and hearing aid. She confirmed with her family that it was not an environmental noise. She put on her back up processor and has not had the issue since. She received a replacement wire from AB due to intermittency. She would like her Calm Situation removed.    OBJECTIVE:     Alem Vizcaino M.D., cochlear implant surgeon, ordered today's appointment. The patient came to the clinic for adjustment to the programs in the external speech processor and for assessment of the external components of the cochlear implant system. These components provide power and data to the internal device. Sound is only heard once the external portion is activated. Postoperative treatment, including device fitting and adjustment, audiologic assessments, and training are required at regular intervals. Testing can include electropsychophysical measures of threshold, comfort, and loudness balancing, which are completed to update the program.    Processor type: Zari CI M90 with T-shanell  Headpiece type: UHP  Magnet strength: 1    TEST RESULTS:   Electrode Impedances: within normal limits  Datalogging: N/A hours of use per day  Neural Response Testing: Did not test  Facial Stimulation: Absent  Tinnitus: Present  Balance Problems: Absent  Pain/Discomfort: Absent  Strategies Tried: tried [place]-P and [place]-S  Strategy Preference: Optima S    Programs: Primary processor  1. Autosense (pulse width 50.3)  2. Speech in noise        Number of Channels per Program: 13 (Electrodes 14-16  disabled due to non auditory stimulation and abnormal loudness growth)    We checked in her destinee with her primary processor and device check was good, she also reported that she was not hearing the clicking with that processor today, however she would like me to submit an RMA for it just to be on the safe side. RMA submitted for processor with serial number ending in [id number]. An electroacoustic analysis revealed the hearing aid is functioning appropriately. We discussed sending it in for repair just in case, however she will hold off for now. Calm situation was removed.      ASSESSMENT:    Cochlear implant programming completed today. No charge for hearing aid check as it is in warranty.    PLAN:   Justus will return for follow-up in 9 months for speech perception testing and cochlear implant programming, sooner per concern. Please call this clinic with questions regarding today s appointment.      Tato Damico, [facility]  Licensed Audiologist  [place] License #[id number]

## 2025-05-24 ENCOUNTER — HEALTH MAINTENANCE LETTER (OUTPATIENT)
Age: 70
End: 2025-05-24

## 2025-08-07 ENCOUNTER — TRANSFERRED RECORDS (OUTPATIENT)
Dept: HEALTH INFORMATION MANAGEMENT | Facility: CLINIC | Age: 70
End: 2025-08-07
Payer: MEDICARE

## 2025-08-07 LAB — CREATININE (EXTERNAL): 0.64 MG/DL (ref 0.52–1.04)

## 2025-08-21 ENCOUNTER — TRANSFERRED RECORDS (OUTPATIENT)
Dept: HEALTH INFORMATION MANAGEMENT | Facility: CLINIC | Age: 70
End: 2025-08-21
Payer: MEDICARE

## 2025-08-21 DIAGNOSIS — M81.0 SENILE OSTEOPOROSIS: Primary | ICD-10-CM

## 2025-08-21 RX ORDER — DIPHENHYDRAMINE HYDROCHLORIDE 50 MG/ML
50 INJECTION, SOLUTION INTRAMUSCULAR; INTRAVENOUS
Start: 2025-08-26

## 2025-08-21 RX ORDER — ALBUTEROL SULFATE 90 UG/1
1-2 INHALANT RESPIRATORY (INHALATION)
Start: 2025-08-26

## 2025-08-21 RX ORDER — MEPERIDINE HYDROCHLORIDE 25 MG/ML
25 INJECTION INTRAMUSCULAR; INTRAVENOUS; SUBCUTANEOUS
OUTPATIENT
Start: 2025-08-26

## 2025-08-21 RX ORDER — EPINEPHRINE 1 MG/ML
0.3 INJECTION, SOLUTION INTRAMUSCULAR; SUBCUTANEOUS EVERY 5 MIN PRN
OUTPATIENT
Start: 2025-08-26

## 2025-08-21 RX ORDER — METHYLPREDNISOLONE SODIUM SUCCINATE 40 MG/ML
40 INJECTION INTRAMUSCULAR; INTRAVENOUS
Start: 2025-08-26

## 2025-08-21 RX ORDER — ALBUTEROL SULFATE 0.83 MG/ML
2.5 SOLUTION RESPIRATORY (INHALATION)
OUTPATIENT
Start: 2025-08-26

## 2025-08-21 RX ORDER — DIPHENHYDRAMINE HYDROCHLORIDE 50 MG/ML
25 INJECTION, SOLUTION INTRAMUSCULAR; INTRAVENOUS
Start: 2025-08-26

## 2025-08-26 ENCOUNTER — ALLIED HEALTH/NURSE VISIT (OUTPATIENT)
Dept: INFUSION THERAPY | Facility: CLINIC | Age: 70
End: 2025-08-26
Attending: SPECIALIST
Payer: MEDICARE

## 2025-08-26 VITALS — TEMPERATURE: 98.5 F

## 2025-08-26 DIAGNOSIS — Z92.29 PERSONAL HISTORY OF OTHER DRUG THERAPY: ICD-10-CM

## 2025-08-26 DIAGNOSIS — M81.0 SENILE OSTEOPOROSIS: Primary | ICD-10-CM

## 2025-08-26 PROCEDURE — 250N000011 HC RX IP 250 OP 636: Mod: JZ | Performed by: SPECIALIST

## 2025-08-26 PROCEDURE — 96372 THER/PROPH/DIAG INJ SC/IM: CPT | Performed by: SPECIALIST

## 2025-08-26 RX ORDER — ALBUTEROL SULFATE 0.83 MG/ML
2.5 SOLUTION RESPIRATORY (INHALATION)
OUTPATIENT
Start: 2026-02-22

## 2025-08-26 RX ORDER — DIPHENHYDRAMINE HYDROCHLORIDE 50 MG/ML
50 INJECTION, SOLUTION INTRAMUSCULAR; INTRAVENOUS
Start: 2026-02-22

## 2025-08-26 RX ORDER — DIPHENHYDRAMINE HYDROCHLORIDE 50 MG/ML
25 INJECTION, SOLUTION INTRAMUSCULAR; INTRAVENOUS
Start: 2026-02-22

## 2025-08-26 RX ORDER — MEPERIDINE HYDROCHLORIDE 25 MG/ML
25 INJECTION INTRAMUSCULAR; INTRAVENOUS; SUBCUTANEOUS
OUTPATIENT
Start: 2026-02-22

## 2025-08-26 RX ORDER — ALBUTEROL SULFATE 90 UG/1
1-2 INHALANT RESPIRATORY (INHALATION)
Start: 2026-02-22

## 2025-08-26 RX ORDER — METHYLPREDNISOLONE SODIUM SUCCINATE 40 MG/ML
40 INJECTION INTRAMUSCULAR; INTRAVENOUS
Start: 2026-02-22

## 2025-08-26 RX ORDER — EPINEPHRINE 1 MG/ML
0.3 INJECTION, SOLUTION INTRAMUSCULAR; SUBCUTANEOUS EVERY 5 MIN PRN
OUTPATIENT
Start: 2026-02-22

## 2025-08-26 RX ADMIN — DENOSUMAB 60 MG: 60 INJECTION SUBCUTANEOUS at 14:29

## (undated) DEVICE — BLADE CLIPPER SGL USE 9680

## (undated) DEVICE — GLOVE PROTEXIS BLUE W/NEU-THERA 7.5  2D73EB75

## (undated) DEVICE — NIM ELEC SUBDERMAL NDL 3PAIR/BOX

## (undated) DEVICE — SUCTION MANIFOLD NEPTUNE 2 SYS 4 PORT 0702-020-000

## (undated) DEVICE — SU DERMABOND PRINEO 22CM CLR222US

## (undated) DEVICE — DRSG XEROFORM 1X8"

## (undated) DEVICE — PREP SKIN SCRUB TRAY 4461A

## (undated) DEVICE — SU VICRYL 3-0 PS-2 27" UND J427H

## (undated) DEVICE — SOL NACL 0.9% IRRIG 500ML BOTTLE 2F7123

## (undated) DEVICE — DRILL BIT 1.8MM  310.509

## (undated) DEVICE — GLOVE PROTEXIS POWDER FREE 8.0 ORTHOPEDIC 2D73ET80

## (undated) DEVICE — SOL WATER IRRIG 1000ML BOTTLE 2F7114

## (undated) DEVICE — PREP POVIDONE-IODINE 7.5% SCRUB 4OZ BOTTLE MDS093945

## (undated) DEVICE — DRSG TEGADERM 2 3/8X2 3/4" 1624W

## (undated) DEVICE — PACK ENT MINOR CUSTOM ASC

## (undated) DEVICE — BNDG ELASTIC 4"X5YDS UNSTERILE 6611-40

## (undated) DEVICE — ESU GROUND PAD ADULT W/CORD E7507

## (undated) DEVICE — PACK EAR CUSTOM ASC

## (undated) DEVICE — LINEN TOWEL PACK X5 5464

## (undated) DEVICE — DRSG STERI STRIP 1/2X4" B1557

## (undated) DEVICE — ESU ELEC BLADE 2.75" COATED/INSULATED E1455

## (undated) DEVICE — SOL WATER IRRIG 500ML BOTTLE 2F7113

## (undated) DEVICE — PREP CHLORAPREP 26ML TINTED ORANGE  260815

## (undated) DEVICE — GOWN IMPERVIOUS SPECIALTY XL/XLONG 39049

## (undated) DEVICE — SU VICRYL 3-0 SH 27" UND J416H

## (undated) DEVICE — PACK HAND WRIST SOP15HWFSP

## (undated) DEVICE — GLOVE PROTEXIS POWDER FREE 7.5 ORTHOPEDIC 2D73ET75

## (undated) DEVICE — BUR STRK ROUND DIAMOND 2.0MM COARSE EXT 5540-013-320

## (undated) DEVICE — SU PROLENE 2-0 SHDA 36" 8523H

## (undated) DEVICE — DRSG GLASSCOCK ADULT S-1000

## (undated) DEVICE — TUBING STRYKER IRRIGATION CASSETTE 5400-050-001

## (undated) DEVICE — GLOVE BIOGEL PI MICRO SZ 6.5 48565

## (undated) DEVICE — DRAPE C-ARM W/STRAPS 42X72" 07-CA104

## (undated) DEVICE — CAST PLASTER SPLINT 3X15" 7393

## (undated) DEVICE — DRAPE U SPLIT 74X120" 29440

## (undated) DEVICE — DRAPE IOBAN INCISE 13X13" 6640EZ

## (undated) DEVICE — DRAPE MICROSCOPE LEICA 54X120" 09-MK653

## (undated) DEVICE — DRILL BIT MINI 110MM QC 2.0MM  310.19

## (undated) DEVICE — SU ETHILON 4-0 FS-2 18" 662H

## (undated) DEVICE — SOL NACL 0.9% INJ 1000ML BAG 2B1324X

## (undated) DEVICE — BONE WAX 2.5GM W31G

## (undated) DEVICE — PREP POVIDONE IODINE SOLUTION 10% 4OZ BOTTLE 29906-004

## (undated) DEVICE — SPONGE SURGIFOAM 100 1974

## (undated) DEVICE — GLOVE PROTEXIS POWDER FREE SMT 6.5  2D72PT65X

## (undated) DEVICE — WIPE INSTRUMENT MEROCEL 400200

## (undated) DEVICE — SOL NACL 0.9% IRRIG 1000ML BOTTLE 2F7124

## (undated) DEVICE — GLOVE PROTEXIS BLUE W/NEU-THERA 8.0  2D73EB80

## (undated) RX ORDER — FENTANYL CITRATE 50 UG/ML
INJECTION, SOLUTION INTRAMUSCULAR; INTRAVENOUS
Status: DISPENSED
Start: 2023-07-10

## (undated) RX ORDER — ONDANSETRON 2 MG/ML
INJECTION INTRAMUSCULAR; INTRAVENOUS
Status: DISPENSED
Start: 2023-07-10

## (undated) RX ORDER — OXYCODONE HYDROCHLORIDE 5 MG/1
TABLET ORAL
Status: DISPENSED
Start: 2023-07-10

## (undated) RX ORDER — CEFAZOLIN SODIUM/WATER 2 G/20 ML
SYRINGE (ML) INTRAVENOUS
Status: DISPENSED
Start: 2022-02-18

## (undated) RX ORDER — ACETAMINOPHEN 325 MG/1
TABLET ORAL
Status: DISPENSED
Start: 2023-07-10

## (undated) RX ORDER — PROPOFOL 10 MG/ML
INJECTION, EMULSION INTRAVENOUS
Status: DISPENSED
Start: 2023-07-10

## (undated) RX ORDER — CEFAZOLIN SODIUM 2 G/50ML
SOLUTION INTRAVENOUS
Status: DISPENSED
Start: 2023-07-10

## (undated) RX ORDER — LIDOCAINE HYDROCHLORIDE AND EPINEPHRINE 10; 10 MG/ML; UG/ML
INJECTION, SOLUTION INFILTRATION; PERINEURAL
Status: DISPENSED
Start: 2023-07-10

## (undated) RX ORDER — HYDROMORPHONE HYDROCHLORIDE 1 MG/ML
INJECTION, SOLUTION INTRAMUSCULAR; INTRAVENOUS; SUBCUTANEOUS
Status: DISPENSED
Start: 2023-07-10

## (undated) RX ORDER — FENTANYL CITRATE-0.9 % NACL/PF 10 MCG/ML
PLASTIC BAG, INJECTION (ML) INTRAVENOUS
Status: DISPENSED
Start: 2023-07-10

## (undated) RX ORDER — PROPOFOL 10 MG/ML
INJECTION, EMULSION INTRAVENOUS
Status: DISPENSED
Start: 2022-02-18

## (undated) RX ORDER — DEXAMETHASONE SODIUM PHOSPHATE 10 MG/ML
INJECTION, SOLUTION INTRAMUSCULAR; INTRAVENOUS
Status: DISPENSED
Start: 2023-07-10

## (undated) RX ORDER — ONDANSETRON 2 MG/ML
INJECTION INTRAMUSCULAR; INTRAVENOUS
Status: DISPENSED
Start: 2022-02-18

## (undated) RX ORDER — FENTANYL CITRATE 50 UG/ML
INJECTION, SOLUTION INTRAMUSCULAR; INTRAVENOUS
Status: DISPENSED
Start: 2022-02-18

## (undated) RX ORDER — DEXAMETHASONE SODIUM PHOSPHATE 4 MG/ML
INJECTION, SOLUTION INTRA-ARTICULAR; INTRALESIONAL; INTRAMUSCULAR; INTRAVENOUS; SOFT TISSUE
Status: DISPENSED
Start: 2022-02-18

## (undated) RX ORDER — BUPIVACAINE HYDROCHLORIDE AND EPINEPHRINE 5; 5 MG/ML; UG/ML
INJECTION, SOLUTION EPIDURAL; INTRACAUDAL; PERINEURAL
Status: DISPENSED
Start: 2022-02-18

## (undated) RX ORDER — LIDOCAINE HYDROCHLORIDE 20 MG/ML
INJECTION, SOLUTION EPIDURAL; INFILTRATION; INTRACAUDAL; PERINEURAL
Status: DISPENSED
Start: 2022-02-18

## (undated) RX ORDER — GLYCOPYRROLATE 0.2 MG/ML
INJECTION INTRAMUSCULAR; INTRAVENOUS
Status: DISPENSED
Start: 2023-07-10

## (undated) RX ORDER — REMIFENTANIL HYDROCHLORIDE 1 MG/ML
INJECTION, POWDER, LYOPHILIZED, FOR SOLUTION INTRAVENOUS
Status: DISPENSED
Start: 2023-07-10